# Patient Record
Sex: FEMALE | Race: WHITE | NOT HISPANIC OR LATINO | Employment: FULL TIME | URBAN - METROPOLITAN AREA
[De-identification: names, ages, dates, MRNs, and addresses within clinical notes are randomized per-mention and may not be internally consistent; named-entity substitution may affect disease eponyms.]

---

## 2018-01-04 ENCOUNTER — OFFICE VISIT CONVERTED (OUTPATIENT)
Dept: INTERNAL MEDICINE | Facility: CLINIC | Age: 43
End: 2018-01-04
Attending: INTERNAL MEDICINE

## 2018-01-08 ENCOUNTER — OFFICE VISIT CONVERTED (OUTPATIENT)
Dept: INTERNAL MEDICINE | Facility: CLINIC | Age: 43
End: 2018-01-08
Attending: PHYSICIAN ASSISTANT

## 2018-02-09 ENCOUNTER — OFFICE VISIT CONVERTED (OUTPATIENT)
Dept: INTERNAL MEDICINE | Facility: CLINIC | Age: 43
End: 2018-02-09
Attending: INTERNAL MEDICINE

## 2018-02-09 ENCOUNTER — CONVERSION ENCOUNTER (OUTPATIENT)
Dept: INTERNAL MEDICINE | Facility: CLINIC | Age: 43
End: 2018-02-09

## 2018-03-07 ENCOUNTER — OFFICE VISIT CONVERTED (OUTPATIENT)
Dept: ORTHOPEDIC SURGERY | Facility: CLINIC | Age: 43
End: 2018-03-07
Attending: ORTHOPAEDIC SURGERY

## 2018-03-12 ENCOUNTER — CONVERSION ENCOUNTER (OUTPATIENT)
Dept: INTERNAL MEDICINE | Facility: CLINIC | Age: 43
End: 2018-03-12

## 2018-03-12 ENCOUNTER — OFFICE VISIT CONVERTED (OUTPATIENT)
Dept: INTERNAL MEDICINE | Facility: CLINIC | Age: 43
End: 2018-03-12
Attending: INTERNAL MEDICINE

## 2018-03-26 ENCOUNTER — OFFICE VISIT CONVERTED (OUTPATIENT)
Dept: ORTHOPEDIC SURGERY | Facility: CLINIC | Age: 43
End: 2018-03-26
Attending: PHYSICIAN ASSISTANT

## 2018-03-29 ENCOUNTER — OFFICE VISIT CONVERTED (OUTPATIENT)
Dept: INTERNAL MEDICINE | Facility: CLINIC | Age: 43
End: 2018-03-29
Attending: NURSE PRACTITIONER

## 2018-03-29 ENCOUNTER — CONVERSION ENCOUNTER (OUTPATIENT)
Dept: OTHER | Facility: HOSPITAL | Age: 43
End: 2018-03-29

## 2018-04-27 ENCOUNTER — OFFICE VISIT CONVERTED (OUTPATIENT)
Dept: INTERNAL MEDICINE | Facility: CLINIC | Age: 43
End: 2018-04-27
Attending: INTERNAL MEDICINE

## 2018-05-17 ENCOUNTER — OFFICE VISIT CONVERTED (OUTPATIENT)
Dept: INTERNAL MEDICINE | Facility: CLINIC | Age: 43
End: 2018-05-17
Attending: NURSE PRACTITIONER

## 2018-06-12 ENCOUNTER — OFFICE VISIT CONVERTED (OUTPATIENT)
Dept: INTERNAL MEDICINE | Facility: CLINIC | Age: 43
End: 2018-06-12
Attending: INTERNAL MEDICINE

## 2018-06-18 ENCOUNTER — OFFICE VISIT CONVERTED (OUTPATIENT)
Dept: GASTROENTEROLOGY | Facility: CLINIC | Age: 43
End: 2018-06-18
Attending: INTERNAL MEDICINE

## 2018-06-25 ENCOUNTER — OFFICE VISIT CONVERTED (OUTPATIENT)
Dept: INTERNAL MEDICINE | Facility: CLINIC | Age: 43
End: 2018-06-25
Attending: INTERNAL MEDICINE

## 2018-07-31 ENCOUNTER — OFFICE VISIT CONVERTED (OUTPATIENT)
Dept: INTERNAL MEDICINE | Facility: CLINIC | Age: 43
End: 2018-07-31
Attending: INTERNAL MEDICINE

## 2018-09-26 ENCOUNTER — CONVERSION ENCOUNTER (OUTPATIENT)
Dept: INTERNAL MEDICINE | Facility: CLINIC | Age: 43
End: 2018-09-26

## 2018-09-26 ENCOUNTER — OFFICE VISIT CONVERTED (OUTPATIENT)
Dept: INTERNAL MEDICINE | Facility: CLINIC | Age: 43
End: 2018-09-26
Attending: PHYSICIAN ASSISTANT

## 2018-10-09 ENCOUNTER — OFFICE VISIT CONVERTED (OUTPATIENT)
Dept: GASTROENTEROLOGY | Facility: CLINIC | Age: 43
End: 2018-10-09
Attending: INTERNAL MEDICINE

## 2018-11-01 ENCOUNTER — OFFICE VISIT CONVERTED (OUTPATIENT)
Dept: ONCOLOGY | Facility: HOSPITAL | Age: 43
End: 2018-11-01
Attending: INTERNAL MEDICINE

## 2018-11-02 ENCOUNTER — OFFICE VISIT CONVERTED (OUTPATIENT)
Dept: INTERNAL MEDICINE | Facility: CLINIC | Age: 43
End: 2018-11-02
Attending: INTERNAL MEDICINE

## 2018-11-29 ENCOUNTER — OFFICE VISIT CONVERTED (OUTPATIENT)
Dept: INTERNAL MEDICINE | Facility: CLINIC | Age: 43
End: 2018-11-29
Attending: INTERNAL MEDICINE

## 2019-01-11 ENCOUNTER — HOSPITAL ENCOUNTER (OUTPATIENT)
Dept: OTHER | Facility: HOSPITAL | Age: 44
Discharge: HOME | End: 2019-01-11
Attending: INTERNAL MEDICINE

## 2019-01-11 LAB
CONV THERAPEUTIC PHLEBOTOMY: 500 ML
FERRITIN SERPL-MCNC: 379 NG/ML (ref 10–200)
HBA1C MFR BLD: 14.2 G/DL (ref 12–16)
HCT VFR BLD AUTO: 38.9 % (ref 37–47)

## 2019-01-16 ENCOUNTER — OFFICE VISIT CONVERTED (OUTPATIENT)
Dept: ORTHOPEDIC SURGERY | Facility: CLINIC | Age: 44
End: 2019-01-16
Attending: PHYSICIAN ASSISTANT

## 2019-01-17 ENCOUNTER — HOSPITAL ENCOUNTER (OUTPATIENT)
Dept: PERIOP | Facility: HOSPITAL | Age: 44
Setting detail: HOSPITAL OUTPATIENT SURGERY
Discharge: HOME | End: 2019-01-17
Attending: ORTHOPAEDIC SURGERY

## 2019-01-30 ENCOUNTER — OFFICE VISIT CONVERTED (OUTPATIENT)
Dept: ORTHOPEDIC SURGERY | Facility: CLINIC | Age: 44
End: 2019-01-30
Attending: PHYSICIAN ASSISTANT

## 2019-02-05 ENCOUNTER — HOSPITAL ENCOUNTER (OUTPATIENT)
Dept: ONCOLOGY | Facility: HOSPITAL | Age: 44
Discharge: HOME | End: 2019-02-05
Attending: NURSE PRACTITIONER

## 2019-02-05 ENCOUNTER — OFFICE VISIT CONVERTED (OUTPATIENT)
Dept: ONCOLOGY | Facility: HOSPITAL | Age: 44
End: 2019-02-05
Attending: NURSE PRACTITIONER

## 2019-02-05 LAB
ALBUMIN SERPL-MCNC: 4.1 G/DL (ref 3.5–5)
ALBUMIN/GLOB SERPL: 1.3 {RATIO} (ref 1.4–2.6)
ALP SERPL-CCNC: 80 U/L (ref 42–98)
ALT SERPL-CCNC: 15 U/L (ref 10–40)
ANION GAP SERPL CALC-SCNC: 14 MMOL/L (ref 8–19)
AST SERPL-CCNC: 16 U/L (ref 15–50)
BASOPHILS # BLD AUTO: 0.07 10*3/UL (ref 0–0.2)
BASOPHILS NFR BLD AUTO: 1.38 % (ref 0–3)
BILIRUB SERPL-MCNC: 0.44 MG/DL (ref 0.2–1.3)
BUN SERPL-MCNC: 12 MG/DL (ref 5–25)
BUN/CREAT SERPL: 21 {RATIO} (ref 6–20)
CALCIUM SERPL-MCNC: 9.2 MG/DL (ref 8.7–10.4)
CHLORIDE SERPL-SCNC: 104 MMOL/L (ref 99–111)
CONV CO2: 28 MMOL/L (ref 22–32)
CONV TOTAL PROTEIN: 7.3 G/DL (ref 6.3–8.2)
CREAT UR-MCNC: 0.56 MG/DL (ref 0.5–0.9)
EOSINOPHIL # BLD AUTO: 0.12 10*3/UL (ref 0–0.7)
EOSINOPHIL # BLD AUTO: 2.41 % (ref 0–7)
ERYTHROCYTE [DISTWIDTH] IN BLOOD BY AUTOMATED COUNT: 11 % (ref 11.5–14.5)
FERRITIN SERPL-MCNC: 439 NG/ML (ref 10–200)
GFR SERPLBLD BASED ON 1.73 SQ M-ARVRAT: >60 ML/MIN/{1.73_M2}
GLOBULIN UR ELPH-MCNC: 3.2 G/DL (ref 2–3.5)
GLUCOSE SERPL-MCNC: 95 MG/DL (ref 65–99)
HBA1C MFR BLD: 12.9 G/DL (ref 12–16)
HCT VFR BLD AUTO: 35.8 % (ref 37–47)
IRON SATN MFR SERPL: 81 % (ref 20–55)
IRON SERPL-MCNC: 208 UG/DL (ref 60–170)
LYMPHOCYTES # BLD AUTO: 1.28 10*3/UL (ref 1–5)
MCH RBC QN AUTO: 34.4 PG (ref 27–31)
MCHC RBC AUTO-ENTMCNC: 36 G/DL (ref 33–37)
MCV RBC AUTO: 95.6 FL (ref 81–99)
MONOCYTES # BLD AUTO: 0.39 10*3/UL (ref 0.2–1.2)
MONOCYTES NFR BLD AUTO: 7.99 % (ref 3–10)
NEUTROPHILS # BLD AUTO: 3.05 10*3/UL (ref 2–8)
NEUTROPHILS NFR BLD AUTO: 62.2 % (ref 30–85)
NRBC BLD AUTO-RTO: 0 % (ref 0–0.01)
OSMOLALITY SERPL CALC.SUM OF ELEC: 294 MOSM/KG (ref 273–304)
PLATELET # BLD AUTO: 249 10*3/UL (ref 130–400)
PMV BLD AUTO: 7.7 FL (ref 7.4–10.4)
POTASSIUM SERPL-SCNC: 3.6 MMOL/L (ref 3.5–5.3)
RBC # BLD AUTO: 3.75 10*6/UL (ref 4.2–5.4)
SODIUM SERPL-SCNC: 142 MMOL/L (ref 135–147)
TIBC SERPL-MCNC: 256 UG/DL (ref 245–450)
TRANSFERRIN SERPL-MCNC: 179 MG/DL (ref 250–380)
VARIANT LYMPHS NFR BLD MANUAL: 26 % (ref 20–45)
WBC # BLD AUTO: 4.9 10*3/UL (ref 4.8–10.8)

## 2019-02-06 ENCOUNTER — HOSPITAL ENCOUNTER (OUTPATIENT)
Dept: OTHER | Facility: HOSPITAL | Age: 44
Discharge: HOME | End: 2019-02-06
Attending: NURSE PRACTITIONER

## 2019-02-06 LAB
CONV THERAPEUTIC PHLEBOTOMY: 504 ML
FERRITIN SERPL-MCNC: 430 NG/ML (ref 10–200)
HBA1C MFR BLD: 13.1 G/DL (ref 12–16)
HCT VFR BLD AUTO: 36.6 % (ref 37–47)

## 2019-02-08 ENCOUNTER — OFFICE VISIT CONVERTED (OUTPATIENT)
Dept: INTERNAL MEDICINE | Facility: CLINIC | Age: 44
End: 2019-02-08
Attending: INTERNAL MEDICINE

## 2019-02-15 ENCOUNTER — HOSPITAL ENCOUNTER (OUTPATIENT)
Dept: OTHER | Facility: HOSPITAL | Age: 44
Discharge: HOME | End: 2019-02-15
Attending: NURSE PRACTITIONER

## 2019-02-15 LAB
CONV THERAPEUTIC PHLEBOTOMY: 513 ML
FERRITIN SERPL-MCNC: 319 NG/ML (ref 10–200)
HBA1C MFR BLD: 11.9 G/DL (ref 12–16)
HCT VFR BLD AUTO: 34.5 % (ref 37–47)

## 2019-02-18 ENCOUNTER — OFFICE VISIT CONVERTED (OUTPATIENT)
Dept: PLASTIC SURGERY | Facility: CLINIC | Age: 44
End: 2019-02-18
Attending: PLASTIC SURGERY

## 2019-02-19 ENCOUNTER — HOSPITAL ENCOUNTER (OUTPATIENT)
Dept: MRI IMAGING | Facility: HOSPITAL | Age: 44
Discharge: HOME | End: 2019-02-19
Attending: PLASTIC SURGERY

## 2019-02-21 ENCOUNTER — HOSPITAL ENCOUNTER (OUTPATIENT)
Dept: OTHER | Facility: HOSPITAL | Age: 44
Discharge: HOME | End: 2019-02-21
Attending: NURSE PRACTITIONER

## 2019-02-21 LAB
CONV THERAPEUTIC PHLEBOTOMY: 508 ML
FERRITIN SERPL-MCNC: 281 NG/ML (ref 10–200)
HBA1C MFR BLD: 11.5 G/DL (ref 12–16)
HCT VFR BLD AUTO: 33.9 % (ref 37–47)

## 2019-03-01 ENCOUNTER — OFFICE VISIT CONVERTED (OUTPATIENT)
Dept: ORTHOPEDIC SURGERY | Facility: CLINIC | Age: 44
End: 2019-03-01
Attending: ORTHOPAEDIC SURGERY

## 2019-03-04 ENCOUNTER — OFFICE VISIT CONVERTED (OUTPATIENT)
Dept: PLASTIC SURGERY | Facility: CLINIC | Age: 44
End: 2019-03-04
Attending: PLASTIC SURGERY

## 2019-03-12 ENCOUNTER — HOSPITAL ENCOUNTER (OUTPATIENT)
Dept: OTHER | Facility: HOSPITAL | Age: 44
Discharge: HOME | End: 2019-03-12

## 2019-03-12 LAB
ALBUMIN SERPL-MCNC: 4 G/DL (ref 3.5–5)
ALBUMIN/GLOB SERPL: 1.4 {RATIO} (ref 1.4–2.6)
ALP SERPL-CCNC: 99 U/L (ref 42–98)
ALT SERPL-CCNC: 29 U/L (ref 10–40)
ANION GAP SERPL CALC-SCNC: 16 MMOL/L (ref 8–19)
AST SERPL-CCNC: 27 U/L (ref 15–50)
BASOPHILS # BLD AUTO: 0.09 10*3/UL (ref 0–0.2)
BASOPHILS NFR BLD AUTO: 1.5 % (ref 0–3)
BILIRUB SERPL-MCNC: 0.18 MG/DL (ref 0.2–1.3)
BUN SERPL-MCNC: 15 MG/DL (ref 5–25)
BUN/CREAT SERPL: 17 {RATIO} (ref 6–20)
CALCIUM SERPL-MCNC: 9 MG/DL (ref 8.7–10.4)
CEA SERPL-MCNC: 0.5 NG/ML (ref 0–5)
CHLORIDE SERPL-SCNC: 104 MMOL/L (ref 99–111)
CONV ABS IMM GRAN: 0.03 10*3/UL (ref 0–0.2)
CONV CO2: 26 MMOL/L (ref 22–32)
CONV IMMATURE GRAN: 0.5 % (ref 0–1.8)
CONV TOTAL PROTEIN: 6.9 G/DL (ref 6.3–8.2)
CREAT UR-MCNC: 0.9 MG/DL (ref 0.5–0.9)
DEPRECATED RDW RBC AUTO: 45 FL (ref 36.4–46.3)
EOSINOPHIL # BLD AUTO: 0.13 10*3/UL (ref 0–0.7)
EOSINOPHIL # BLD AUTO: 2.2 % (ref 0–7)
ERYTHROCYTE [DISTWIDTH] IN BLOOD BY AUTOMATED COUNT: 12.2 % (ref 11.7–14.4)
GFR SERPLBLD BASED ON 1.73 SQ M-ARVRAT: >60 ML/MIN/{1.73_M2}
GLOBULIN UR ELPH-MCNC: 2.9 G/DL (ref 2–3.5)
GLUCOSE SERPL-MCNC: 105 MG/DL (ref 65–99)
HBA1C MFR BLD: 12.3 G/DL (ref 12–16)
HCT VFR BLD AUTO: 35.7 % (ref 37–47)
LDH SERPL-CCNC: 196 U/L (ref 120–240)
LYMPHOCYTES # BLD AUTO: 1.4 10*3/UL (ref 1–5)
MCH RBC QN AUTO: 34.4 PG (ref 27–31)
MCHC RBC AUTO-ENTMCNC: 34.5 G/DL (ref 33–37)
MCV RBC AUTO: 99.7 FL (ref 81–99)
MONOCYTES # BLD AUTO: 0.51 10*3/UL (ref 0.2–1.2)
MONOCYTES NFR BLD AUTO: 8.7 % (ref 3–10)
NEUTROPHILS # BLD AUTO: 3.7 10*3/UL (ref 2–8)
NEUTROPHILS NFR BLD AUTO: 63.2 % (ref 30–85)
NRBC CBCN: 0 % (ref 0–0.7)
OSMOLALITY SERPL CALC.SUM OF ELEC: 295 MOSM/KG (ref 273–304)
PLATELET # BLD AUTO: 237 10*3/UL (ref 130–400)
PMV BLD AUTO: 11.2 FL (ref 9.4–12.3)
POTASSIUM SERPL-SCNC: 3.8 MMOL/L (ref 3.5–5.3)
RBC # BLD AUTO: 3.58 10*6/UL (ref 4.2–5.4)
SODIUM SERPL-SCNC: 142 MMOL/L (ref 135–147)
VARIANT LYMPHS NFR BLD MANUAL: 23.9 % (ref 20–45)
WBC # BLD AUTO: 5.86 10*3/UL (ref 4.8–10.8)

## 2019-03-14 LAB — CANCER AG27-29 SERPL-ACNC: 28.7 U/ML (ref 0–38.6)

## 2019-04-08 ENCOUNTER — OFFICE VISIT CONVERTED (OUTPATIENT)
Dept: ORTHOPEDIC SURGERY | Facility: CLINIC | Age: 44
End: 2019-04-08
Attending: ORTHOPAEDIC SURGERY

## 2019-04-08 ENCOUNTER — HOSPITAL ENCOUNTER (OUTPATIENT)
Dept: OTHER | Facility: HOSPITAL | Age: 44
Discharge: HOME | End: 2019-04-08
Attending: NURSE PRACTITIONER

## 2019-04-08 ENCOUNTER — CONVERSION ENCOUNTER (OUTPATIENT)
Dept: ORTHOPEDIC SURGERY | Facility: CLINIC | Age: 44
End: 2019-04-08

## 2019-04-08 LAB
BASOPHILS # BLD AUTO: 0.09 10*3/UL (ref 0–0.2)
BASOPHILS NFR BLD AUTO: 1.5 % (ref 0–3)
CONV ABS IMM GRAN: 0.02 10*3/UL (ref 0–0.2)
CONV IMMATURE GRAN: 0.3 % (ref 0–1.8)
DEPRECATED RDW RBC AUTO: 40.4 FL (ref 36.4–46.3)
EOSINOPHIL # BLD AUTO: 0.18 10*3/UL (ref 0–0.7)
EOSINOPHIL # BLD AUTO: 3 % (ref 0–7)
ERYTHROCYTE [DISTWIDTH] IN BLOOD BY AUTOMATED COUNT: 11.3 % (ref 11.7–14.4)
HBA1C MFR BLD: 13.2 G/DL (ref 12–16)
HCT VFR BLD AUTO: 37.9 % (ref 37–47)
LYMPHOCYTES # BLD AUTO: 1.5 10*3/UL (ref 1–5)
MCH RBC QN AUTO: 33.9 PG (ref 27–31)
MCHC RBC AUTO-ENTMCNC: 34.8 G/DL (ref 33–37)
MCV RBC AUTO: 97.4 FL (ref 81–99)
MONOCYTES # BLD AUTO: 0.49 10*3/UL (ref 0.2–1.2)
MONOCYTES NFR BLD AUTO: 8 % (ref 3–10)
NEUTROPHILS # BLD AUTO: 3.81 10*3/UL (ref 2–8)
NEUTROPHILS NFR BLD AUTO: 62.6 % (ref 30–85)
NRBC CBCN: 0 % (ref 0–0.7)
PLATELET # BLD AUTO: 255 10*3/UL (ref 130–400)
PMV BLD AUTO: 11.4 FL (ref 9.4–12.3)
RBC # BLD AUTO: 3.89 10*6/UL (ref 4.2–5.4)
VARIANT LYMPHS NFR BLD MANUAL: 24.6 % (ref 20–45)
WBC # BLD AUTO: 6.09 10*3/UL (ref 4.8–10.8)

## 2019-04-09 ENCOUNTER — HOSPITAL ENCOUNTER (OUTPATIENT)
Dept: ONCOLOGY | Facility: HOSPITAL | Age: 44
Discharge: HOME | End: 2019-04-09

## 2019-04-09 ENCOUNTER — OFFICE VISIT CONVERTED (OUTPATIENT)
Dept: ONCOLOGY | Facility: HOSPITAL | Age: 44
End: 2019-04-09

## 2019-04-09 LAB — FERRITIN SERPL-MCNC: 208 NG/ML (ref 10–200)

## 2019-04-19 ENCOUNTER — HOSPITAL ENCOUNTER (OUTPATIENT)
Dept: OTHER | Facility: HOSPITAL | Age: 44
Discharge: HOME | End: 2019-04-19
Attending: NURSE PRACTITIONER

## 2019-04-19 LAB
CONV THERAPEUTIC PHLEBOTOMY: 500 ML
FERRITIN SERPL-MCNC: 254 NG/ML (ref 10–200)
HBA1C MFR BLD: 13.7 G/DL (ref 12–16)
HCT VFR BLD AUTO: 38.6 % (ref 37–47)

## 2019-04-23 ENCOUNTER — HOSPITAL ENCOUNTER (OUTPATIENT)
Dept: OTHER | Facility: HOSPITAL | Age: 44
Discharge: HOME | End: 2019-04-23
Attending: NURSE PRACTITIONER

## 2019-04-23 ENCOUNTER — OFFICE VISIT CONVERTED (OUTPATIENT)
Dept: INTERNAL MEDICINE | Facility: CLINIC | Age: 44
End: 2019-04-23
Attending: INTERNAL MEDICINE

## 2019-04-26 ENCOUNTER — HOSPITAL ENCOUNTER (OUTPATIENT)
Dept: MRI IMAGING | Facility: HOSPITAL | Age: 44
Discharge: HOME | End: 2019-04-26
Attending: INTERNAL MEDICINE

## 2019-05-09 ENCOUNTER — CONVERSION ENCOUNTER (OUTPATIENT)
Dept: INTERNAL MEDICINE | Facility: CLINIC | Age: 44
End: 2019-05-09

## 2019-05-09 ENCOUNTER — OFFICE VISIT CONVERTED (OUTPATIENT)
Dept: INTERNAL MEDICINE | Facility: CLINIC | Age: 44
End: 2019-05-09
Attending: INTERNAL MEDICINE

## 2019-06-10 ENCOUNTER — OFFICE VISIT CONVERTED (OUTPATIENT)
Dept: INTERNAL MEDICINE | Facility: CLINIC | Age: 44
End: 2019-06-10
Attending: INTERNAL MEDICINE

## 2019-06-10 ENCOUNTER — HOSPITAL ENCOUNTER (OUTPATIENT)
Dept: OTHER | Facility: HOSPITAL | Age: 44
Discharge: HOME | End: 2019-06-10
Attending: INTERNAL MEDICINE

## 2019-06-10 LAB
BASOPHILS # BLD AUTO: 0.06 10*3/UL (ref 0–0.2)
BASOPHILS NFR BLD AUTO: 1.3 % (ref 0–3)
CHOLEST SERPL-MCNC: 176 MG/DL (ref 107–200)
CHOLEST/HDLC SERPL: 3.6 {RATIO} (ref 3–6)
CONV ABS IMM GRAN: 0.02 10*3/UL (ref 0–0.2)
CONV IMMATURE GRAN: 0.4 % (ref 0–1.8)
DEPRECATED RDW RBC AUTO: 47.8 FL (ref 36.4–46.3)
EOSINOPHIL # BLD AUTO: 0.1 10*3/UL (ref 0–0.7)
EOSINOPHIL # BLD AUTO: 2.2 % (ref 0–7)
ERYTHROCYTE [DISTWIDTH] IN BLOOD BY AUTOMATED COUNT: 13 % (ref 11.7–14.4)
FSH SERPL-ACNC: 61.5 M[IU]/ML
HBA1C MFR BLD: 12.8 G/DL (ref 12–16)
HCT VFR BLD AUTO: 38.7 % (ref 37–47)
HDLC SERPL-MCNC: 49 MG/DL (ref 40–60)
LDLC SERPL CALC-MCNC: 105 MG/DL (ref 70–100)
LYMPHOCYTES # BLD AUTO: 1.21 10*3/UL (ref 1–5)
MCH RBC QN AUTO: 33.4 PG (ref 27–31)
MCHC RBC AUTO-ENTMCNC: 33.1 G/DL (ref 33–37)
MCV RBC AUTO: 101 FL (ref 81–99)
MONOCYTES # BLD AUTO: 0.4 10*3/UL (ref 0.2–1.2)
MONOCYTES NFR BLD AUTO: 8.7 % (ref 3–10)
NEUTROPHILS # BLD AUTO: 2.83 10*3/UL (ref 2–8)
NEUTROPHILS NFR BLD AUTO: 61.2 % (ref 30–85)
NRBC CBCN: 0 % (ref 0–0.7)
PLATELET # BLD AUTO: 229 10*3/UL (ref 130–400)
PMV BLD AUTO: 11.4 FL (ref 9.4–12.3)
RBC # BLD AUTO: 3.83 10*6/UL (ref 4.2–5.4)
TRIGL SERPL-MCNC: 110 MG/DL (ref 40–150)
VARIANT LYMPHS NFR BLD MANUAL: 26.2 % (ref 20–45)
VLDLC SERPL-MCNC: 22 MG/DL (ref 5–37)
WBC # BLD AUTO: 4.62 10*3/UL (ref 4.8–10.8)

## 2019-06-17 ENCOUNTER — OFFICE VISIT CONVERTED (OUTPATIENT)
Dept: PLASTIC SURGERY | Facility: CLINIC | Age: 44
End: 2019-06-17
Attending: PLASTIC SURGERY

## 2019-08-23 ENCOUNTER — HOSPITAL ENCOUNTER (OUTPATIENT)
Dept: OTHER | Facility: HOSPITAL | Age: 44
Discharge: HOME | End: 2019-08-23
Attending: INTERNAL MEDICINE

## 2019-08-23 ENCOUNTER — OFFICE VISIT CONVERTED (OUTPATIENT)
Dept: INTERNAL MEDICINE | Facility: CLINIC | Age: 44
End: 2019-08-23
Attending: INTERNAL MEDICINE

## 2019-08-23 LAB
ALBUMIN SERPL-MCNC: 4.6 G/DL (ref 3.5–5)
ALBUMIN/GLOB SERPL: 1.4 {RATIO} (ref 1.4–2.6)
ALP SERPL-CCNC: 94 U/L (ref 42–98)
ALT SERPL-CCNC: 16 U/L (ref 10–40)
ANION GAP SERPL CALC-SCNC: 14 MMOL/L (ref 8–19)
AST SERPL-CCNC: 22 U/L (ref 15–50)
BASOPHILS # BLD AUTO: 0.07 10*3/UL (ref 0–0.2)
BASOPHILS NFR BLD AUTO: 1.2 % (ref 0–3)
BILIRUB SERPL-MCNC: 0.3 MG/DL (ref 0.2–1.3)
BUN SERPL-MCNC: 16 MG/DL (ref 5–25)
BUN/CREAT SERPL: 25 {RATIO} (ref 6–20)
CALCIUM SERPL-MCNC: 9.4 MG/DL (ref 8.7–10.4)
CHLORIDE SERPL-SCNC: 100 MMOL/L (ref 99–111)
CONV ABS IMM GRAN: 0.02 10*3/UL (ref 0–0.2)
CONV CO2: 28 MMOL/L (ref 22–32)
CONV IMMATURE GRAN: 0.3 % (ref 0–1.8)
CONV TOTAL PROTEIN: 7.8 G/DL (ref 6.3–8.2)
CREAT UR-MCNC: 0.64 MG/DL (ref 0.5–0.9)
DEPRECATED RDW RBC AUTO: 43.2 FL (ref 36.4–46.3)
EOSINOPHIL # BLD AUTO: 0.14 10*3/UL (ref 0–0.7)
EOSINOPHIL # BLD AUTO: 2.4 % (ref 0–7)
ERYTHROCYTE [DISTWIDTH] IN BLOOD BY AUTOMATED COUNT: 11.9 % (ref 11.7–14.4)
FERRITIN SERPL-MCNC: 105 NG/ML (ref 10–200)
FSH SERPL-ACNC: 107 M[IU]/ML
GFR SERPLBLD BASED ON 1.73 SQ M-ARVRAT: >60 ML/MIN/{1.73_M2}
GLOBULIN UR ELPH-MCNC: 3.2 G/DL (ref 2–3.5)
GLUCOSE SERPL-MCNC: 93 MG/DL (ref 65–99)
HCT VFR BLD AUTO: 42.5 % (ref 37–47)
HGB BLD-MCNC: 14 G/DL (ref 12–16)
IRON SATN MFR SERPL: 34 % (ref 20–55)
IRON SERPL-MCNC: 94 UG/DL (ref 60–170)
LYMPHOCYTES # BLD AUTO: 1.44 10*3/UL (ref 1–5)
LYMPHOCYTES NFR BLD AUTO: 24.4 % (ref 20–45)
MCH RBC QN AUTO: 33 PG (ref 27–31)
MCHC RBC AUTO-ENTMCNC: 32.9 G/DL (ref 33–37)
MCV RBC AUTO: 100.2 FL (ref 81–99)
MONOCYTES # BLD AUTO: 0.55 10*3/UL (ref 0.2–1.2)
MONOCYTES NFR BLD AUTO: 9.3 % (ref 3–10)
NEUTROPHILS # BLD AUTO: 3.67 10*3/UL (ref 2–8)
NEUTROPHILS NFR BLD AUTO: 62.4 % (ref 30–85)
NRBC CBCN: 0 % (ref 0–0.7)
OSMOLALITY SERPL CALC.SUM OF ELEC: 287 MOSM/KG (ref 273–304)
PLATELET # BLD AUTO: 264 10*3/UL (ref 130–400)
PMV BLD AUTO: 11.5 FL (ref 9.4–12.3)
POTASSIUM SERPL-SCNC: 3.7 MMOL/L (ref 3.5–5.3)
RBC # BLD AUTO: 4.24 10*6/UL (ref 4.2–5.4)
SODIUM SERPL-SCNC: 138 MMOL/L (ref 135–147)
TIBC SERPL-MCNC: 273 UG/DL (ref 245–450)
TRANSFERRIN SERPL-MCNC: 191 MG/DL (ref 250–380)
TSH SERPL-ACNC: 0.96 M[IU]/L (ref 0.27–4.2)
WBC # BLD AUTO: 5.89 10*3/UL (ref 4.8–10.8)

## 2019-08-25 LAB — PROGEST SERPL-MCNC: 0.2 NG/ML

## 2019-08-26 ENCOUNTER — OFFICE VISIT CONVERTED (OUTPATIENT)
Dept: PLASTIC SURGERY | Facility: CLINIC | Age: 44
End: 2019-08-26
Attending: PLASTIC SURGERY

## 2019-08-26 ENCOUNTER — CONVERSION ENCOUNTER (OUTPATIENT)
Dept: PLASTIC SURGERY | Facility: CLINIC | Age: 44
End: 2019-08-26

## 2019-08-26 LAB — CONV ESTROGENS, TOTAL, SERUM: 85 PG/ML

## 2019-08-28 ENCOUNTER — TELEPHONE CONVERTED (OUTPATIENT)
Dept: ONCOLOGY | Facility: HOSPITAL | Age: 44
End: 2019-08-28

## 2019-08-29 ENCOUNTER — TRANSCRIBE ORDERS (OUTPATIENT)
Dept: ADMINISTRATIVE | Facility: HOSPITAL | Age: 44
End: 2019-08-29

## 2019-08-29 DIAGNOSIS — C50.919 MALIGNANT NEOPLASM OF FEMALE BREAST, UNSPECIFIED ESTROGEN RECEPTOR STATUS, UNSPECIFIED LATERALITY, UNSPECIFIED SITE OF BREAST (HCC): ICD-10-CM

## 2019-08-29 DIAGNOSIS — C50.919: Primary | ICD-10-CM

## 2019-08-29 DIAGNOSIS — Z17.1: Primary | ICD-10-CM

## 2019-08-31 LAB
CONV TESTOSTERONE, FREE: 1 PG/ML
TESTOST FREE MFR SERPL: 0.8 %
TESTOST SERPL-MCNC: 13 NG/DL

## 2019-09-04 ENCOUNTER — HOSPITAL ENCOUNTER (OUTPATIENT)
Dept: PERIOP | Facility: HOSPITAL | Age: 44
Setting detail: HOSPITAL OUTPATIENT SURGERY
Discharge: HOME | End: 2019-09-04
Attending: PLASTIC SURGERY

## 2019-09-04 ENCOUNTER — PROCEDURE VISIT CONVERTED (OUTPATIENT)
Dept: OTHER | Facility: HOSPITAL | Age: 44
End: 2019-09-04
Attending: PLASTIC SURGERY

## 2019-09-06 ENCOUNTER — HOSPITAL ENCOUNTER (OUTPATIENT)
Dept: PET IMAGING | Facility: HOSPITAL | Age: 44
Discharge: HOME | End: 2019-09-06
Admitting: INTERNAL MEDICINE

## 2019-09-06 ENCOUNTER — HOSPITAL ENCOUNTER (OUTPATIENT)
Dept: PET IMAGING | Facility: HOSPITAL | Age: 44
Discharge: HOME | End: 2019-09-06

## 2019-09-06 DIAGNOSIS — C50.919 MALIGNANT NEOPLASM OF FEMALE BREAST, UNSPECIFIED ESTROGEN RECEPTOR STATUS, UNSPECIFIED LATERALITY, UNSPECIFIED SITE OF BREAST (HCC): ICD-10-CM

## 2019-09-06 LAB — GLUCOSE BLDC GLUCOMTR-MCNC: 90 MG/DL (ref 70–130)

## 2019-09-06 PROCEDURE — A9552 F18 FDG: HCPCS | Performed by: INTERNAL MEDICINE

## 2019-09-06 PROCEDURE — 78815 PET IMAGE W/CT SKULL-THIGH: CPT

## 2019-09-06 PROCEDURE — 82962 GLUCOSE BLOOD TEST: CPT

## 2019-09-06 PROCEDURE — 0 FLUDEOXYGLUCOSE F18 SOLUTION: Performed by: INTERNAL MEDICINE

## 2019-09-06 RX ADMIN — FLUDEOXYGLUCOSE F18 1 DOSE: 300 INJECTION INTRAVENOUS at 12:32

## 2019-09-09 ENCOUNTER — OFFICE VISIT CONVERTED (OUTPATIENT)
Dept: PLASTIC SURGERY | Facility: CLINIC | Age: 44
End: 2019-09-09
Attending: PLASTIC SURGERY

## 2019-10-14 ENCOUNTER — CONVERSION ENCOUNTER (OUTPATIENT)
Dept: INTERNAL MEDICINE | Facility: CLINIC | Age: 44
End: 2019-10-14

## 2019-10-14 ENCOUNTER — OFFICE VISIT CONVERTED (OUTPATIENT)
Dept: INTERNAL MEDICINE | Facility: CLINIC | Age: 44
End: 2019-10-14
Attending: INTERNAL MEDICINE

## 2019-10-14 ENCOUNTER — OFFICE VISIT CONVERTED (OUTPATIENT)
Dept: PLASTIC SURGERY | Facility: CLINIC | Age: 44
End: 2019-10-14
Attending: PLASTIC SURGERY

## 2019-10-25 ENCOUNTER — HOSPITAL ENCOUNTER (OUTPATIENT)
Dept: URGENT CARE | Facility: CLINIC | Age: 44
Discharge: HOME | End: 2019-10-25

## 2019-11-13 ENCOUNTER — OFFICE VISIT CONVERTED (OUTPATIENT)
Dept: ORTHOPEDIC SURGERY | Facility: CLINIC | Age: 44
End: 2019-11-13
Attending: PHYSICIAN ASSISTANT

## 2019-12-02 ENCOUNTER — OFFICE VISIT CONVERTED (OUTPATIENT)
Dept: INTERNAL MEDICINE | Facility: CLINIC | Age: 44
End: 2019-12-02
Attending: INTERNAL MEDICINE

## 2020-01-02 ENCOUNTER — OFFICE VISIT CONVERTED (OUTPATIENT)
Dept: INTERNAL MEDICINE | Facility: CLINIC | Age: 45
End: 2020-01-02
Attending: INTERNAL MEDICINE

## 2020-01-20 ENCOUNTER — OFFICE VISIT CONVERTED (OUTPATIENT)
Dept: PLASTIC SURGERY | Facility: CLINIC | Age: 45
End: 2020-01-20
Attending: PLASTIC SURGERY

## 2020-01-20 ENCOUNTER — CONVERSION ENCOUNTER (OUTPATIENT)
Dept: PLASTIC SURGERY | Facility: CLINIC | Age: 45
End: 2020-01-20

## 2020-02-20 ENCOUNTER — OFFICE VISIT CONVERTED (OUTPATIENT)
Dept: INTERNAL MEDICINE | Facility: CLINIC | Age: 45
End: 2020-02-20
Attending: INTERNAL MEDICINE

## 2020-02-25 ENCOUNTER — OFFICE VISIT CONVERTED (OUTPATIENT)
Dept: NEUROSURGERY | Facility: CLINIC | Age: 45
End: 2020-02-25
Attending: PHYSICIAN ASSISTANT

## 2020-02-26 ENCOUNTER — HOSPITAL ENCOUNTER (OUTPATIENT)
Dept: GENERAL RADIOLOGY | Facility: HOSPITAL | Age: 45
Discharge: HOME | End: 2020-02-26
Attending: PHYSICIAN ASSISTANT

## 2020-02-28 ENCOUNTER — HOSPITAL ENCOUNTER (OUTPATIENT)
Dept: OTHER | Facility: HOSPITAL | Age: 45
Discharge: HOME | End: 2020-02-28

## 2020-02-28 LAB
ALBUMIN SERPL-MCNC: 4 G/DL (ref 3.5–5)
ALBUMIN/GLOB SERPL: 1.3 {RATIO} (ref 1.4–2.6)
ALP SERPL-CCNC: 72 U/L (ref 42–98)
ALT SERPL-CCNC: 12 U/L (ref 10–40)
ANION GAP SERPL CALC-SCNC: 18 MMOL/L (ref 8–19)
AST SERPL-CCNC: 16 U/L (ref 15–50)
BASOPHILS # BLD AUTO: 0.1 10*3/UL (ref 0–0.2)
BASOPHILS NFR BLD AUTO: 1.5 % (ref 0–3)
BILIRUB SERPL-MCNC: 0.38 MG/DL (ref 0.2–1.3)
BUN SERPL-MCNC: 13 MG/DL (ref 5–25)
BUN/CREAT SERPL: 19 {RATIO} (ref 6–20)
CALCIUM SERPL-MCNC: 9 MG/DL (ref 8.7–10.4)
CEA SERPL-MCNC: 0.5 NG/ML (ref 0–5)
CHLORIDE SERPL-SCNC: 100 MMOL/L (ref 99–111)
CONV ABS IMM GRAN: 0.03 10*3/UL (ref 0–0.2)
CONV CO2: 25 MMOL/L (ref 22–32)
CONV IMMATURE GRAN: 0.5 % (ref 0–1.8)
CONV TOTAL PROTEIN: 7 G/DL (ref 6.3–8.2)
CREAT UR-MCNC: 0.68 MG/DL (ref 0.5–0.9)
DEPRECATED RDW RBC AUTO: 43.6 FL (ref 36.4–46.3)
EOSINOPHIL # BLD AUTO: 0.15 10*3/UL (ref 0–0.7)
EOSINOPHIL # BLD AUTO: 2.3 % (ref 0–7)
ERYTHROCYTE [DISTWIDTH] IN BLOOD BY AUTOMATED COUNT: 12.1 % (ref 11.7–14.4)
FERRITIN SERPL-MCNC: 106 NG/ML (ref 10–200)
GFR SERPLBLD BASED ON 1.73 SQ M-ARVRAT: >60 ML/MIN/{1.73_M2}
GLOBULIN UR ELPH-MCNC: 3 G/DL (ref 2–3.5)
GLUCOSE SERPL-MCNC: 107 MG/DL (ref 65–99)
HCT VFR BLD AUTO: 40.4 % (ref 37–47)
HGB BLD-MCNC: 13.5 G/DL (ref 12–16)
IRON SATN MFR SERPL: 84 % (ref 20–55)
IRON SERPL-MCNC: 249 UG/DL (ref 60–170)
LDH SERPL-CCNC: 183 U/L (ref 120–240)
LYMPHOCYTES # BLD AUTO: 1.29 10*3/UL (ref 1–5)
LYMPHOCYTES NFR BLD AUTO: 19.4 % (ref 20–45)
MCH RBC QN AUTO: 32.9 PG (ref 27–31)
MCHC RBC AUTO-ENTMCNC: 33.4 G/DL (ref 33–37)
MCV RBC AUTO: 98.5 FL (ref 81–99)
MONOCYTES # BLD AUTO: 0.38 10*3/UL (ref 0.2–1.2)
MONOCYTES NFR BLD AUTO: 5.7 % (ref 3–10)
NEUTROPHILS # BLD AUTO: 4.71 10*3/UL (ref 2–8)
NEUTROPHILS NFR BLD AUTO: 70.6 % (ref 30–85)
NRBC CBCN: 0 % (ref 0–0.7)
OSMOLALITY SERPL CALC.SUM OF ELEC: 289 MOSM/KG (ref 273–304)
PLATELET # BLD AUTO: 273 10*3/UL (ref 130–400)
PMV BLD AUTO: 11.5 FL (ref 9.4–12.3)
POTASSIUM SERPL-SCNC: 3.6 MMOL/L (ref 3.5–5.3)
RBC # BLD AUTO: 4.1 10*6/UL (ref 4.2–5.4)
SODIUM SERPL-SCNC: 139 MMOL/L (ref 135–147)
TIBC SERPL-MCNC: 297 UG/DL (ref 245–450)
TRANSFERRIN SERPL-MCNC: 208 MG/DL (ref 250–380)
WBC # BLD AUTO: 6.66 10*3/UL (ref 4.8–10.8)

## 2020-02-29 LAB — CANCER AG27-29 SERPL-ACNC: 27.1 U/ML (ref 0–38.6)

## 2020-04-21 ENCOUNTER — TELEMEDICINE CONVERTED (OUTPATIENT)
Dept: NEUROSURGERY | Facility: CLINIC | Age: 45
End: 2020-04-21
Attending: NEUROLOGICAL SURGERY

## 2020-05-28 ENCOUNTER — OFFICE VISIT CONVERTED (OUTPATIENT)
Dept: INTERNAL MEDICINE | Facility: CLINIC | Age: 45
End: 2020-05-28
Attending: INTERNAL MEDICINE

## 2020-09-11 ENCOUNTER — HOSPITAL ENCOUNTER (OUTPATIENT)
Dept: URGENT CARE | Facility: CLINIC | Age: 45
Discharge: HOME | End: 2020-09-11
Attending: EMERGENCY MEDICINE

## 2020-12-04 ENCOUNTER — TELEMEDICINE CONVERTED (OUTPATIENT)
Dept: INTERNAL MEDICINE | Facility: CLINIC | Age: 45
End: 2020-12-04
Attending: INTERNAL MEDICINE

## 2021-01-19 ENCOUNTER — HOSPITAL ENCOUNTER (OUTPATIENT)
Dept: OTHER | Facility: HOSPITAL | Age: 46
Discharge: HOME | End: 2021-01-19
Attending: INTERNAL MEDICINE

## 2021-01-19 LAB
APPEARANCE UR: CLEAR
BILIRUB UR QL: NEGATIVE
COLOR UR: YELLOW
CONV COLLECTION SOURCE (UA): NORMAL
CONV UROBILINOGEN IN URINE BY AUTOMATED TEST STRIP: 0.2 {EHRLICHU}/DL (ref 0.1–1)
GLUCOSE UR QL: NEGATIVE MG/DL
HGB UR QL STRIP: NEGATIVE
KETONES UR QL STRIP: NEGATIVE MG/DL
LEUKOCYTE ESTERASE UR QL STRIP: NEGATIVE
NITRITE UR QL STRIP: NEGATIVE
PH UR STRIP.AUTO: 6.5 [PH] (ref 5–8)
PROT UR QL: NEGATIVE MG/DL
SP GR UR: 1.01 (ref 1–1.03)

## 2021-01-21 LAB — BACTERIA UR CULT: NORMAL

## 2021-03-09 ENCOUNTER — HOSPITAL ENCOUNTER (OUTPATIENT)
Dept: OTHER | Facility: HOSPITAL | Age: 46
Discharge: HOME | End: 2021-03-09
Attending: PHYSICIAN ASSISTANT

## 2021-03-09 ENCOUNTER — OFFICE VISIT CONVERTED (OUTPATIENT)
Dept: INTERNAL MEDICINE | Facility: CLINIC | Age: 46
End: 2021-03-09
Attending: PHYSICIAN ASSISTANT

## 2021-03-09 LAB
BASOPHILS # BLD AUTO: 0.09 10*3/UL (ref 0–0.2)
BASOPHILS NFR BLD AUTO: 1.4 % (ref 0–3)
CONV ABS IMM GRAN: 0.03 10*3/UL (ref 0–0.2)
CONV IMMATURE GRAN: 0.5 % (ref 0–1.8)
DEPRECATED RDW RBC AUTO: 41.6 FL (ref 36.4–46.3)
EOSINOPHIL # BLD AUTO: 0.17 10*3/UL (ref 0–0.7)
EOSINOPHIL # BLD AUTO: 2.7 % (ref 0–7)
ERYTHROCYTE [DISTWIDTH] IN BLOOD BY AUTOMATED COUNT: 11.8 % (ref 11.7–14.4)
HCT VFR BLD AUTO: 39.9 % (ref 37–47)
HGB BLD-MCNC: 13.5 G/DL (ref 12–16)
LYMPHOCYTES # BLD AUTO: 1.27 10*3/UL (ref 1–5)
LYMPHOCYTES NFR BLD AUTO: 20.3 % (ref 20–45)
MCH RBC QN AUTO: 32.7 PG (ref 27–31)
MCHC RBC AUTO-ENTMCNC: 33.8 G/DL (ref 33–37)
MCV RBC AUTO: 96.6 FL (ref 81–99)
MONOCYTES # BLD AUTO: 0.47 10*3/UL (ref 0.2–1.2)
MONOCYTES NFR BLD AUTO: 7.5 % (ref 3–10)
NEUTROPHILS # BLD AUTO: 4.23 10*3/UL (ref 2–8)
NEUTROPHILS NFR BLD AUTO: 67.6 % (ref 30–85)
NRBC CBCN: 0 % (ref 0–0.7)
PLATELET # BLD AUTO: 248 10*3/UL (ref 130–400)
PMV BLD AUTO: 12 FL (ref 9.4–12.3)
RBC # BLD AUTO: 4.13 10*6/UL (ref 4.2–5.4)
WBC # BLD AUTO: 6.26 10*3/UL (ref 4.8–10.8)

## 2021-03-10 LAB
ALBUMIN SERPL-MCNC: 4 G/DL (ref 3.5–5)
ALBUMIN/GLOB SERPL: 1.3 {RATIO} (ref 1.4–2.6)
ALP SERPL-CCNC: 83 U/L (ref 42–98)
ALT SERPL-CCNC: 15 U/L (ref 10–40)
ANION GAP SERPL CALC-SCNC: 14 MMOL/L (ref 8–19)
AST SERPL-CCNC: 19 U/L (ref 15–50)
BILIRUB SERPL-MCNC: 0.27 MG/DL (ref 0.2–1.3)
BUN SERPL-MCNC: 11 MG/DL (ref 5–25)
BUN/CREAT SERPL: 16 {RATIO} (ref 6–20)
CALCIUM SERPL-MCNC: 9 MG/DL (ref 8.7–10.4)
CHLORIDE SERPL-SCNC: 103 MMOL/L (ref 99–111)
CONV CO2: 26 MMOL/L (ref 22–32)
CONV TOTAL PROTEIN: 7 G/DL (ref 6.3–8.2)
CREAT UR-MCNC: 0.67 MG/DL (ref 0.5–0.9)
GFR SERPLBLD BASED ON 1.73 SQ M-ARVRAT: >60 ML/MIN/{1.73_M2}
GLOBULIN UR ELPH-MCNC: 3 G/DL (ref 2–3.5)
GLUCOSE SERPL-MCNC: 86 MG/DL (ref 65–99)
OSMOLALITY SERPL CALC.SUM OF ELEC: 287 MOSM/KG (ref 273–304)
POTASSIUM SERPL-SCNC: 4.1 MMOL/L (ref 3.5–5.3)
SODIUM SERPL-SCNC: 139 MMOL/L (ref 135–147)
T4 FREE SERPL-MCNC: 1.4 NG/DL (ref 0.9–1.8)
TSH SERPL-ACNC: 0.84 M[IU]/L (ref 0.27–4.2)

## 2021-04-16 ENCOUNTER — HOSPITAL ENCOUNTER (OUTPATIENT)
Dept: OTHER | Facility: HOSPITAL | Age: 46
Discharge: HOME | End: 2021-04-16
Attending: INTERNAL MEDICINE

## 2021-04-16 ENCOUNTER — CONVERSION ENCOUNTER (OUTPATIENT)
Dept: INTERNAL MEDICINE | Facility: CLINIC | Age: 46
End: 2021-04-16

## 2021-04-16 ENCOUNTER — OFFICE VISIT CONVERTED (OUTPATIENT)
Dept: INTERNAL MEDICINE | Facility: CLINIC | Age: 46
End: 2021-04-16
Attending: INTERNAL MEDICINE

## 2021-04-16 LAB
ALBUMIN SERPL-MCNC: 3.9 G/DL (ref 3.5–5)
ALBUMIN/GLOB SERPL: 1.5 {RATIO} (ref 1.4–2.6)
ALP SERPL-CCNC: 60 U/L (ref 42–98)
ALT SERPL-CCNC: 15 U/L (ref 10–40)
AMYLASE SERPL-CCNC: 33 U/L (ref 30–110)
ANION GAP SERPL CALC-SCNC: 10 MMOL/L (ref 8–19)
AST SERPL-CCNC: 19 U/L (ref 15–50)
BASOPHILS # BLD AUTO: 0.07 10*3/UL (ref 0–0.2)
BASOPHILS NFR BLD AUTO: 1.2 % (ref 0–3)
BILIRUB SERPL-MCNC: 0.36 MG/DL (ref 0.2–1.3)
BUN SERPL-MCNC: 12 MG/DL (ref 5–25)
BUN/CREAT SERPL: 16 {RATIO} (ref 6–20)
CALCIUM SERPL-MCNC: 8.6 MG/DL (ref 8.7–10.4)
CHLORIDE SERPL-SCNC: 106 MMOL/L (ref 99–111)
CONV ABS IMM GRAN: 0.03 10*3/UL (ref 0–0.2)
CONV CO2: 25 MMOL/L (ref 22–32)
CONV IMMATURE GRAN: 0.5 % (ref 0–1.8)
CONV TOTAL PROTEIN: 6.5 G/DL (ref 6.3–8.2)
CREAT UR-MCNC: 0.75 MG/DL (ref 0.5–0.9)
DEPRECATED RDW RBC AUTO: 40.3 FL (ref 36.4–46.3)
EOSINOPHIL # BLD AUTO: 0.21 10*3/UL (ref 0–0.7)
EOSINOPHIL # BLD AUTO: 3.7 % (ref 0–7)
ERYTHROCYTE [DISTWIDTH] IN BLOOD BY AUTOMATED COUNT: 11.7 % (ref 11.7–14.4)
GFR SERPLBLD BASED ON 1.73 SQ M-ARVRAT: >60 ML/MIN/{1.73_M2}
GLOBULIN UR ELPH-MCNC: 2.6 G/DL (ref 2–3.5)
GLUCOSE SERPL-MCNC: 94 MG/DL (ref 65–99)
HCT VFR BLD AUTO: 35.7 % (ref 37–47)
HGB BLD-MCNC: 12.4 G/DL (ref 12–16)
LIPASE SERPL-CCNC: 31 U/L (ref 5–51)
LYMPHOCYTES # BLD AUTO: 1.35 10*3/UL (ref 1–5)
LYMPHOCYTES NFR BLD AUTO: 23.8 % (ref 20–45)
MCH RBC QN AUTO: 33.2 PG (ref 27–31)
MCHC RBC AUTO-ENTMCNC: 34.7 G/DL (ref 33–37)
MCV RBC AUTO: 95.5 FL (ref 81–99)
MONOCYTES # BLD AUTO: 0.49 10*3/UL (ref 0.2–1.2)
MONOCYTES NFR BLD AUTO: 8.6 % (ref 3–10)
NEUTROPHILS # BLD AUTO: 3.52 10*3/UL (ref 2–8)
NEUTROPHILS NFR BLD AUTO: 62.2 % (ref 30–85)
NRBC CBCN: 0 % (ref 0–0.7)
OSMOLALITY SERPL CALC.SUM OF ELEC: 284 MOSM/KG (ref 273–304)
PLATELET # BLD AUTO: 238 10*3/UL (ref 130–400)
PMV BLD AUTO: 11.6 FL (ref 9.4–12.3)
POTASSIUM SERPL-SCNC: 3.8 MMOL/L (ref 3.5–5.3)
RBC # BLD AUTO: 3.74 10*6/UL (ref 4.2–5.4)
SODIUM SERPL-SCNC: 137 MMOL/L (ref 135–147)
WBC # BLD AUTO: 5.67 10*3/UL (ref 4.8–10.8)

## 2021-04-18 LAB — CONV CELIAC DISEASE AB-IGA: 258 MG/DL (ref 68–408)

## 2021-04-19 LAB — TTG IGA SER-ACNC: <2 U/ML (ref 0–3)

## 2021-05-12 NOTE — PROGRESS NOTES
Progress Note      Patient Name: Keerthi Pillai   Patient ID: [id number]   Sex: Female   YOB: 1975    Primary Care Provider: Jennifer Ocasio MD   Referring Provider: Jennifer Ocasio MD    Visit Date: April 21, 2020    Provider: Fredis Larkin MD   Location: [facility]   Location Address: [address] [place] [address]  [id number]   Location Phone: [phone]          Chief Complaint  · Follow-up     Complains of low back and right hip pain.       History Of Present Illness  Video Conferencing Visit  Keerthi Pillai is a 45 year old /White female who is presenting for evaluation via video conferencing. Verbal consent obtained before beginning visit.   The following staff were present during this visit: NA   The patient is a 45 year old /White female who is in the office for followup appointment.      Lower back pain for about a year. She has L5-S1 spondylolisthesis. Worse with prolonged walking/standing or laying flat for a long time. It is better with laying on her left side. She has seen some improvement with P.T. She is continuing some home P.T. Her DEXA was normal. No movement on flex-ext.       Past Medical History  Acne; Anxiety; Arthritis; Breast cancer; Cancer; Depression; Forgetfulness; Hemochromatosis; Hypertension, Benign Essential; Lumbago/low back pain; Migraines; Moderate episode of recurrent major depressive disorder; Night sweats; Postmenopausal; Primary osteoarthritis of right knee; Sinus Trouble; unspecified; Spondylolisthesis , lumbar         Past Surgical History  Breast reconstruction with implants; Cesarian Section; Colonoscopy; Fat grafting; Joint Surgery; Knee repair; Mastectomy, bilateral; Port Placement         Medication List  Activella 1-0.5 mg oral tablet; CBD Oil; Celebrex 200 mg oral capsule; hydroxyzine HCl 25 mg oral tablet; lisinopril 20 mg oral tablet; triamcinolone acetonide 0.5 % topical ointment; Trintellix 10 mg oral  tablet         Allergy List  NO KNOWN DRUG ALLERGIES       Allergies Reconciled  Family Medical History  Stroke; Heart Disease; Family history of brain cancer; Family history of certain chronic disabling diseases; arthritis; Family history of Arthritis         Reproductive History   3 Para 2 0 1 2       Social History  Alcohol (Current some day); Alcohol Use (Never); lives with children; lives with spouse; ; .; Moderate Amount of Exercise (1-3 times weekly); No known infection risk; Recreational Drug Use (Never); Tobacco (Never); Working         Immunizations  Name Date Admin   Hepatitis A    Influenza          Review of Systems  · Constitutional  o Denies  o : chills, excessive sweating, fatigue, fever, sycope/passing out, weight gain, weight loss  · Eyes  o Denies  o : changes in vision, blurry vision, double vision  · HENT  o Denies  o : loss of hearing, ringing in the ears, ear aches, sore throat, nasal congestion, sinus pain, nose bleeds, seasonal allergies  · Cardiovascular  o Denies  o : blood clots, swollen legs, anemia, easy burising or bleeding, transfusions  · Respiratory  o Denies  o : shortness of breath, dry cough, productive cough, pneumonia, COPD  · Gastrointestinal  o Denies  o : difficulty swallowing, reflux  · Genitourinary  o Denies  o : incontinence  · Neurologic  o Denies  o : headache, seizure, stroke, tremor, loss of balance, falls, dizziness/vertigo, difficulty with sleep, numbness/tingling/paresthesia , difficulty with coordination, difficulty with dexterity, weakness  · Musculoskeletal  o Admits  o : joint pain, low back pain  o Denies  o : neck stiffness/pain, swollen lymph nodes, muscle aches, weakness, spasms, sciatica, pain radiating in arm, pain radiating in leg  · Endocrine  o Denies  o : diabetes, thyroid disorder  · Psychiatric  o Denies  o : anxiety, depression      Physical Examination  · Constitutional  o Appearance  o : well-nourished, well developed,  alert, in no acute distress     Video visit, no PE performed               Assessment  · Lumbago/low back pain     724.3/M54.40  · Spondylolisthesis , lumbar     738.4/M43.16  L5-S1 stable on flex-ext      Plan  · Medications  o Medications have been Reconciled  o Transition of Care or Provider Policy  · Instructions  o She would like to hold off on surgery for now. She will contact us with any worsening lower back or leg pain. She will work on core strength as well as avoidance of activities that worsen the pain.             Electronically Signed by: Fredis Larkin MD -Author on April 21, 2020 01:36:59 PM

## 2021-05-13 NOTE — PROGRESS NOTES
Progress Note      Patient Name: Keerthi Pillai   Patient ID: [id number]   Sex: Female   YOB: 1975    Primary Care Provider: Jennifer Ocasio MD   Referring Provider: Jennifer Ocasio MD    Visit Date: December 4, 2020    Provider: Jennifer Ocasio MD   Location: [facility]   Location Address: [address] [place] [address]  [id number]   Location Phone: [phone]          Chief Complaint  · Telehealth/zoom      History Of Present Illness  Video Conferencing Visit  Keerthi Pillai is a 45 year old /White female who is presenting for evaluation via video conferencing via zoom. Verbal consent obtained before beginning visit.   The following staff were present during this visit: Jennifer Ocasio MD; Torrie GONZALEZ MA.   Keerthi Pillai is a 45 year old /White female who presents for evaluation and treatment of:      has been having some leg pain  she is using a brace  she is having trouble with numbness  the pain is waking her up at night    HTN-  checks at home from time to time  running well  around 130's or less  no chest pain  no trouble breathing  no leg swelling    stress is doing very well    estrogen/progesterone doing great, still understands the risk of this but states that she is feeling great       Past Medical History  Disease Name Date Onset Notes   Acne --  --    Anxiety --  --    Arthritis --  --    Breast cancer --  --    Cancer --  --    Depression --  --    Forgetfulness --  --    Hemochromatosis 09/03/2018 --    Hypertension, Benign Essential --  --    Lumbago/low back pain 04/21/2020 --    Migraines --  --    Moderate episode of recurrent major depressive disorder 12/10/2017 will adjust lexapro and wellbutrin and see how she does   Night sweats --  --    Postmenopausal 04/21/2020 --    Primary osteoarthritis of right knee 03/07/2018 --    Sinus Trouble; unspecified --  --    Spondylolisthesis , lumbar 04/21/2020 --          Past  Surgical History  Procedure Name Date Notes   Breast reconstruction with implants --  --    Cesarian Section 2007 --    Colonoscopy --  --    Fat grafting 19 to bilateral breasts   Joint Surgery --  --    Knee repair  --    Mastectomy, bilateral --  --    Port Placement --  --          Medication List  Name Date Started Instructions   CBD Oil  take 30 mg BID   celecoxib 200 mg oral capsule 2020 TAKE ONE CAPSULE BY MOUTH EVERY DAY   cyclobenzaprine 10 mg oral tablet 2020 take 1 tablet (10 mg) by oral route 2 times per day   estradiol-norethindrone acet 1-0.5 mg oral tablet 2020 TAKE ONE TABLET BY MOUTH ONCE DAILY   hydroxyzine HCl 25 mg oral tablet 2019 take one tab po QHS   lisinopril 20 mg oral tablet 2020 TAKE 1 TABLET BY MOUTH EVERY DAY   Trintellix 10 mg oral tablet 2020 take 1 tablet (10 mg) by oral route once daily at the same time each day         Allergy List  Allergen Name Date Reaction Notes   NO KNOWN DRUG ALLERGIES --  --  --        Allergies Reconciled  Family Medical History  Disease Name Relative/Age Notes   Stroke  --    Heart Disease  --    Family history of brain cancer  --    Family history of certain chronic disabling diseases; arthritis Father/  Mother/   Mother; Father   Family history of Arthritis Father/  Mother/   Mother; Father         Reproductive History  Menstrual   Age Menarche: 13   Pregnancy Summary   Total Pregnancies: 3 Full Term: 2 Premature: 0   Ab Induced: 0 Ab Spontaneous: 1 Ectopics: 0   Multiples: 0 Livin         Social History  Finding Status Start/Stop Quantity Notes   Alcohol Current some day --/-- --  rarely drinks, less than 1 drink per day, has been drinking for 11-20 years   Alcohol Use Never --/-- --  does not drink   lives with children --  --/-- --  --    lives with spouse --  --/-- --  --     --  --/-- --  --    . --  --/-- --  --    Moderate Amount of Exercise (1-3 times weekly) --  --/-- --  --     No known infection risk --  --/-- --  --    Recreational Drug Use Never --/-- --  no   Tobacco Never --/-- --  never smoker   Working --  --/-- --  --          Immunizations  NameDate Admin Mfg Trade Name Lot Number Route Inj VIS Given VIS Publication   Hepatitis A04/27/2018 SKB HAVRIX-ADULT  IM LD 04/27/2018 07/20/2016   Comments: patient tolerated well, left office in stable condition.   [id number]/02/2019 [facility] Fluzone Quadrivalent [id number] IM LD 12/02/2019    Comments: Tolerated well         Review of Systems  · Constitutional  o Denies  o : fever, fatigue, weight loss, weight gain  · Cardiovascular  o Denies  o : lower extremity edema, claudication, chest pressure, palpitations  · Respiratory  o Denies  o : shortness of breath, wheezing, cough, hemoptysis, dyspnea on exertion  · Gastrointestinal  o Denies  o : nausea, vomiting, diarrhea, constipation, abdominal pain      Physical Examination     Gen: well-nourished, no acute distress  HENT: atraumatic, normocephalic  Eyes: extraocular movements intact, no scleral icterus  Lung: breathing comfortably, no cough  Skin: no visible rash, no lesions  Neuro: grossly oriented to person, place, and time. no facial droop   Psych: normal mood and affect             Assessment  · Hemochromatosis     275.03/E83.119  Stable  · Postmenopausal     V49.81/Z78.0  Doing well on current replacements  · Anxiety     300.02/F41.1  Doing great on current meds continue for now  · Breast cancer     174.9/C50.919  doing well  still following with her oncologist     chronic issues stable, cont current meds     Problems Reconciled  Plan  · Orders  o ACO-39: Current medications updated and reviewed (1159F, ) - - 12/04/2020  · Medications  o celecoxib 200 mg oral capsule   SIG: TAKE ONE CAPSULE BY MOUTH EVERY DAY   DISP: (90) Capsule with 1 refills  Refilled on 12/04/2020     o cyclobenzaprine 10 mg oral tablet   SIG: take 1 tablet (10 mg) by oral route 2 times per day   DISP:  (30) Tablet with 0 refills  Refilled on 12/04/2020     o estradiol-norethindrone acet 1-0.5 mg oral tablet   SIG: TAKE ONE TABLET BY MOUTH ONCE DAILY   DISP: (28) Tablet with 1 refills  Refilled on 12/04/2020     o Trintellix 10 mg oral tablet   SIG: take 1 tablet (10 mg) by oral route once daily at the same time each day   DISP: (60) Tablet with 1 refills  Refilled on 12/04/2020     o Activella 1-0.5 mg oral tablet   SIG: take 1 tablet by oral route once daily   DISP: (30) Tablet with 0 refills  Discontinued on 12/04/2020     o Medications have been Reconciled  o Transition of Care or Provider Policy  · Instructions  o Patient was educated/instructed on their diagnosis, treatment and medications prior to discharge from the clinic today.  · Disposition  o 6 Month Follow Up            Electronically Signed by: Jennifer Ocasio MD -Author on December 27, 2020 03:17:55 PM

## 2021-05-13 NOTE — PROGRESS NOTES
"   Progress Note      Patient Name: Keerthi Pillai   Patient ID: [id number]   Sex: Female   YOB: 1975    Primary Care Provider: Jennifer Ocasio MD   Referring Provider: Jennifer Ocasio MD    Visit Date: May 28, 2020    Provider: Jennifer Ocasio MD   Location: [facility]   Location Address: [address] [place] [address]  [id number]   Location Phone: [phone]          Chief Complaint     \"just following up\"       History Of Present Illness  Video Conferencing Visit  Keerthi Pillai is a 45 year old /White female who is presenting for evaluation via video conferencing via [employer]. Verbal consent obtained before beginning visit.   The following staff were present during this visit: Jennifer Ocasio MD   Keerthi Pillai is a 45 year old /White female who presents for evaluation and treatment of:      chronic issues    states that she is doing really well    likes the activella  no side effects so far  feels like she has more energy and not as anxious  still understands risk of breast cancer    Trintellix is working very well for her  no chest pain  no palpitations       Past Medical History  Disease Name Date Onset Notes   Acne --  --    Anxiety --  --    Arthritis --  --    Breast cancer --  --    Cancer --  --    Depression --  --    Forgetfulness --  --    Hemochromatosis 09/03/2018 --    Hypertension, Benign Essential --  --    Lumbago/low back pain 04/21/2020 --    Migraines --  --    Moderate episode of recurrent major depressive disorder 12/10/2017 will adjust lexapro and wellbutrin and see how she does   Night sweats --  --    Postmenopausal 04/21/2020 --    Primary osteoarthritis of right knee 03/07/2018 --    Sinus Trouble; unspecified --  --    Spondylolisthesis , lumbar 04/21/2020 --          Past Surgical History  Procedure Name Date Notes   Breast reconstruction with implants --  --    Cesarian Section 2007 2010 --    Colonoscopy " --  --    Fat grafting 19 to bilateral breasts   Joint Surgery --  --    Knee repair  --    Mastectomy, bilateral --  --    Port Placement --  --          Medication List  Name Date Started Instructions   Activella 1-0.5 mg oral tablet 2020 take 1 tablet by oral route once daily   CBD Oil  take 30 mg BID   Celebrex 200 mg oral capsule  take 1 capsule (200 mg) by oral route once daily   hydroxyzine HCl 25 mg oral tablet 2019 take one tab po QHS   lisinopril 20 mg oral tablet 2020 take 1 tablet (20 mg) by oral route once daily   triamcinolone acetonide 0.5 % topical ointment 2020 apply a thin layer to the affected area(s) by topical route 2 times per day for 30 days   Trintellix 10 mg oral tablet 2020 take 1 tablet (10 mg) by oral route once daily at the same time each day         Allergy List  Allergen Name Date Reaction Notes   NO KNOWN DRUG ALLERGIES --  --  --          Family Medical History  Disease Name Relative/Age Notes   Stroke  --    Heart Disease  --    Family history of brain cancer  --    Family history of certain chronic disabling diseases; arthritis Father/  Mother/   Mother; Father   Family history of Arthritis Father/  Mother/   Mother; Father         Reproductive History  Menstrual   Age Menarche: 13   Pregnancy Summary   Total Pregnancies: 3 Full Term: 2 Premature: 0   Ab Induced: 0 Ab Spontaneous: 1 Ectopics: 0   Multiples: 0 Livin         Social History  Finding Status Start/Stop Quantity Notes   Alcohol Current some day --/-- --  rarely drinks, less than 1 drink per day, has been drinking for 11-20 years   Alcohol Use Never --/-- --  does not drink   lives with children --  --/-- --  --    lives with spouse --  --/-- --  --     --  --/-- --  --    . --  --/-- --  --    Moderate Amount of Exercise (1-3 times weekly) --  --/-- --  --    No known infection risk --  --/-- --  --    Recreational Drug Use Never --/-- --  no   Tobacco Never --/--  --  never smoker   Working --  --/-- --  --          Immunizations  NameDate Admin Mfg Trade Name Lot Number Route Inj VIS Given VIS Publication   Hepatitis A04/27/2018 SKB HAVRIX-ADULT  IM LD 04/27/2018 07/20/2016   Comments: patient tolerated well, left office in stable condition.   [id number]/02/2019 [facility] Fluzone Quadrivalent [id number] IM LD 12/02/2019    Comments: Tolerated well         Review of Systems  · Constitutional  o Denies  o : fever, fatigue, weight loss, weight gain  · Cardiovascular  o Denies  o : lower extremity edema, claudication, chest pressure, palpitations  · Respiratory  o Denies  o : shortness of breath, wheezing, cough, hemoptysis, dyspnea on exertion  · Gastrointestinal  o Denies  o : nausea, vomiting, diarrhea, constipation, abdominal pain      Physical Examination     Gen: well-nourished, no acute distress  HENT: atraumatic, normocephalic  Eyes: extraocular movements intact, no scleral icterus  Lung: breathing comfortably, no cough  Skin: no visible rash, no lesions  Neuro: grossly oriented to person, place, and time. no facial droop   Psych: normal mood and affect             Assessment  · Moderate episode of recurrent major depressive disorder     296.32/F33.1  will adjust lexapro and wellbutrin and see how she does  · Anxiety     300.02/F41.1  · Breast cancer     174.9/C50.919       cont current meds  she is doing great  cont to monitor breast cancer symptoms very closely due to risk of activella  however otherwise doing great     Problems Reconciled  Plan  · Orders  o ACO-39: Current medications updated and reviewed () - - 05/28/2020  · Instructions  o Patient was educated/instructed on their diagnosis, treatment and medications prior to discharge from the clinic today.            Electronically Signed by: Jennifer Ocasio MD -Author on May 31, 2020 06:13:39 PM

## 2021-05-14 VITALS
WEIGHT: 168.12 LBS | HEART RATE: 67 BPM | OXYGEN SATURATION: 99 % | TEMPERATURE: 97.3 F | SYSTOLIC BLOOD PRESSURE: 122 MMHG | BODY MASS INDEX: 31.74 KG/M2 | HEIGHT: 61 IN | DIASTOLIC BLOOD PRESSURE: 76 MMHG

## 2021-05-14 VITALS
HEART RATE: 64 BPM | WEIGHT: 167.5 LBS | SYSTOLIC BLOOD PRESSURE: 128 MMHG | BODY MASS INDEX: 31.63 KG/M2 | HEIGHT: 61 IN | OXYGEN SATURATION: 98 % | TEMPERATURE: 98.4 F | DIASTOLIC BLOOD PRESSURE: 78 MMHG

## 2021-05-14 NOTE — PROGRESS NOTES
Progress Note      Patient Name: Keerthi Pillai   Patient ID: [id number]   Sex: Female   YOB: 1975    Primary Care Provider: Jennifer Ocasio MD   Referring Provider: Jennifer Ocasio MD    Visit Date: April 16, 2021    Provider: Jennifer Ocasio MD   Location: [facility]   Location Address: [address] [place] [address]  [id number]   Location Phone: [phone]          Chief Complaint  · f/u anxiety      History Of Present Illness  Keerthi Pillai is a 46 year old /White female who presents for evaluation and treatment of:      Chronic issues.    States that her anxiety did not improve with addition of the BuSpar  Reviewed note from Ms. Worthy for her last visit in our office  She had decreased her Trintellix prior to this  No chest pain  No trouble breathing  [place] she has been experiencing significant diarrhea states that several other members in her family experience this as well however hers just has not gone away it has been greater than 10 days it is mostly watery with some cramping no severe abdominal pain       Past Medical History  Disease Name Date Onset Notes   Acne --  --    Anxiety --  --    Arthritis --  --    Breast cancer --  --    Cancer --  --    Depression --  --    Forgetfulness --  --    Hemochromatosis 09/03/2018 --    Hypertension, Benign Essential --  --    Lumbago/low back pain 04/21/2020 --    Migraines --  --    Moderate episode of recurrent major depressive disorder 12/10/2017 will adjust lexapro and wellbutrin and see how she does   Night sweats --  --    Postmenopausal 04/21/2020 --    Primary osteoarthritis of right knee 03/07/2018 --    Sinus Trouble; unspecified --  --    Spondylolisthesis , lumbar 04/21/2020 --          Past Surgical History  Procedure Name Date Notes   Breast reconstruction with implants --  --    Cesarian Section 2007 2010 --    Colonoscopy --  --    Fat grafting 09/4/19 to  bilateral breasts   Joint Surgery --  --    Knee repair  --    Mastectomy, bilateral --  --    Port Placement --  --          Medication List  Name Date Started Instructions   CBD Oil  take 30 mg BID   celecoxib 200 mg oral capsule 2020 TAKE ONE CAPSULE BY MOUTH EVERY DAY   cyclobenzaprine 10 mg oral tablet 2020 take 1 tablet (10 mg) by oral route 2 times per day   estradiol-norethindrone acet 1-0.5 mg oral tablet 2021 TAKE ONE TABLET BY MOUTH ONCE DAILY for 90 days   hydroxyzine HCl 25 mg oral tablet 2019 take one tab po QHS   lisinopril 20 mg oral tablet 2021 TAKE 1 TABLET BY MOUTH EVERY DAY   Trintellix 10 mg oral tablet 2021 take 1 tablet (10 mg) by oral route once daily at the same time each day         Allergy List  Allergen Name Date Reaction Notes   NO KNOWN DRUG ALLERGIES --  --  --        Allergies Reconciled  Family Medical History  Disease Name Relative/Age Notes   Stroke  --    Heart Disease  --    Family history of brain cancer  --    Family history of certain chronic disabling diseases; arthritis Father/  Mother/   Mother; Father   Family history of Arthritis Father/  Mother/   Mother; Father         Reproductive History  Menstrual   Age Menarche: 13   Pregnancy Summary   Total Pregnancies: 3 Full Term: 2 Premature: 0   Ab Induced: 0 Ab Spontaneous: 1 Ectopics: 0   Multiples: 0 Livin         Social History  Finding Status Start/Stop Quantity Notes   Alcohol Current some day --/-- --  rarely drinks, less than 1 drink per day, has been drinking for 11-20 years   Alcohol Use Never --/-- --  does not drink   lives with children --  --/-- --  --    lives with spouse --  --/-- --  --     --  --/-- --  --    . --  --/-- --  --    Moderate Amount of Exercise (1-3 times weekly) --  --/-- --  --    No known infection risk --  --/-- --  --    Recreational Drug Use Never --/-- --  no   Tobacco Never --/-- --  never smoker   Working --  --/-- --  --   "        Immunizations  NameDate Admin Mfg Trade Name Lot Number Route Inj VIS Given VIS Publication   Hepatitis A04/27/2018 [facility] HAVRIX-ADULT  IM LD 04/27/2018 07/20/2016   Comments: patient tolerated well, left office in stable condition.   Dtbdedsrz67/02/2019 [facility] Fluzone Quadrivalent [id number] IM LD 12/02/2019    Comments: Tolerated well         Vitals  Date Time BP Position Site L\R Cuff Size HR RR TEMP (F) WT  HT  BMI kg/m2 BSA m2 O2 Sat FR L/min FiO2 HC       02/20/2020 04:16 /68 Sitting    66 - R  97.8 172lbs 6oz 5'  1\" 32.57 1.83 97 %      02/25/2020 01:57 PM      78 - R   178lbs 0oz 5'  1\" 33.63 1.86       03/09/2021 09:36 /76 Sitting    67 - R  97.3 168lbs 2oz 5'  1\" 31.77 1.81 99 %  21%    04/16/2021 02:03 /78 Sitting    64 - R  98.4 167lbs 8oz 5'  1\" 31.65 1.81 98 %  21%          Physical Examination  · Constitutional  o Appearance  o : no acute distress, well-nourished  · Head and Face  o Head  o :   § Inspection  § : atraumatic, normocephalic  · Eyes  o Eyes  o : extraocular movements intact, no scleral icterus, no conjunctival injection  · Respiratory  o Respiratory Effort  o : breathing comfortably, symmetric chest rise  o Auscultation of Lungs  o : clear to asculatation bilaterally, no wheezes, rales, or rhonchii  · Cardiovascular  o Heart  o :   § Auscultation of Heart  § : regular rate and rhythm, no murmurs, rubs, or gallops  o Peripheral Vascular System  o :   § Extremities  § : no edema  · Gastrointestinal  o Abdominal Examination  o :   § Abdomen  § : bowel sounds slightly increased , non-distended, non-tender  · Neurologic  o Mental Status Examination  o :   § Orientation  § : grossly oriented to person, place and time  o Gait and Station  o :   § Gait Screening  § : normal gait  · Psychiatric  o General  o : normal mood and affect          Assessment  · Arthritis     716.90/M19.90  Will increase Trintellix to 20  · Diarrhea     787.91/R19.7  Will order stool studies and " blood work for further etiology  Will start probiotic therapy as well  · Essential hypertension     401.9/I10  Blood pressure has been slightly elevated would recommend increasing lisinopril  · Screening for depression     V79.0/Z13.89    Problems Reconciled  Plan  · Orders  o Giardia and Cryptosporidium Enzyme Immunoassay [facility] (69440, 47120) - 787.91/R19.7 - 04/16/2021  o Stool culture and sensitivity (91427) - 787.91/R19.7 - 04/16/2021  o C difficile Toxigenic Assay (PCR) [facility] (76887) - 787.91/R19.7 - 04/16/2021  o Lactoferrin (Fecal) Qualitative (20173) - 787.91/R19.7 - 04/16/2021  o Amylase + lipase (26048, 79373) - 787.91/R19.7 - 04/16/2021  o CBC with Auto Diff [facility] (52070) - 787.91/R19.7 - 04/16/2021  o CMP [facility] (79061) - 787.91/R19.7 - 04/16/2021  o Celiac Disease Antibody Panel(Profile) (CELID) - 787.91/R19.7 - 04/16/2021  o ACO-39: Current medications updated and reviewed (, 1159F) - - 04/16/2021  · Medications  o lisinopril 20 mg oral tablet   SIG: TAKE 1 TABLET BY MOUTH EVERY DAY   DISP: (90) Tablet with 1 refills  Refilled on 04/16/2021     o Trintellix 10 mg oral tablet   SIG: take 1 tablet (10 mg) by oral route once daily at the same time each day   DISP: (60) Tablet with 1 refills  Refilled on 04/16/2021     o buspirone 10 mg oral tablet   SIG: take 1 tablet (10 mg) by oral route 2 times per day for 30 days   DISP: (60) Tablet with 1 refills  Discontinued on 04/16/2021     o Medications have been Reconciled  o Transition of Care or Provider Policy  · Instructions  o Depression Screen completed and scanned into the EMR under the designated folder within the patient's documents.  o Patient was educated/instructed on their diagnosis, treatment and medications prior to discharge from the clinic today.            Electronically Signed by: Jennifer Ocasio MD -Author on April 19, 2021 08:11:09 AM

## 2021-05-15 VITALS
BODY MASS INDEX: 32.47 KG/M2 | TEMPERATURE: 98.3 F | SYSTOLIC BLOOD PRESSURE: 132 MMHG | DIASTOLIC BLOOD PRESSURE: 94 MMHG | HEART RATE: 62 BPM | HEIGHT: 61 IN | WEIGHT: 172 LBS | OXYGEN SATURATION: 99 %

## 2021-05-15 VITALS
OXYGEN SATURATION: 100 % | DIASTOLIC BLOOD PRESSURE: 79 MMHG | HEART RATE: 83 BPM | SYSTOLIC BLOOD PRESSURE: 135 MMHG | WEIGHT: 157.25 LBS | HEIGHT: 61 IN | BODY MASS INDEX: 29.69 KG/M2

## 2021-05-15 VITALS — HEIGHT: 61 IN | BODY MASS INDEX: 30.49 KG/M2 | WEIGHT: 161.5 LBS

## 2021-05-15 VITALS
HEART RATE: 68 BPM | HEIGHT: 61 IN | WEIGHT: 164 LBS | BODY MASS INDEX: 30.96 KG/M2 | SYSTOLIC BLOOD PRESSURE: 159 MMHG | OXYGEN SATURATION: 99 % | DIASTOLIC BLOOD PRESSURE: 89 MMHG

## 2021-05-15 VITALS
OXYGEN SATURATION: 98 % | BODY MASS INDEX: 30.55 KG/M2 | SYSTOLIC BLOOD PRESSURE: 137 MMHG | DIASTOLIC BLOOD PRESSURE: 81 MMHG | WEIGHT: 166 LBS | HEIGHT: 62 IN | HEART RATE: 83 BPM

## 2021-05-15 VITALS
HEIGHT: 61 IN | TEMPERATURE: 97.3 F | DIASTOLIC BLOOD PRESSURE: 98 MMHG | BODY MASS INDEX: 31.81 KG/M2 | WEIGHT: 168.5 LBS | HEART RATE: 84 BPM | OXYGEN SATURATION: 95 % | SYSTOLIC BLOOD PRESSURE: 140 MMHG

## 2021-05-15 VITALS — BODY MASS INDEX: 29.83 KG/M2 | HEART RATE: 73 BPM | HEIGHT: 61 IN | OXYGEN SATURATION: 99 % | WEIGHT: 158 LBS

## 2021-05-15 VITALS
RESPIRATION RATE: 16 BRPM | HEART RATE: 72 BPM | BODY MASS INDEX: 32.69 KG/M2 | DIASTOLIC BLOOD PRESSURE: 80 MMHG | HEIGHT: 61 IN | TEMPERATURE: 96.9 F | WEIGHT: 173.12 LBS | SYSTOLIC BLOOD PRESSURE: 122 MMHG | OXYGEN SATURATION: 100 %

## 2021-05-15 VITALS
DIASTOLIC BLOOD PRESSURE: 93 MMHG | HEIGHT: 61 IN | SYSTOLIC BLOOD PRESSURE: 135 MMHG | HEART RATE: 74 BPM | BODY MASS INDEX: 32.31 KG/M2 | OXYGEN SATURATION: 97 % | WEIGHT: 171.12 LBS

## 2021-05-15 VITALS
SYSTOLIC BLOOD PRESSURE: 122 MMHG | TEMPERATURE: 97.8 F | HEIGHT: 61 IN | HEART RATE: 66 BPM | DIASTOLIC BLOOD PRESSURE: 68 MMHG | BODY MASS INDEX: 32.55 KG/M2 | OXYGEN SATURATION: 97 % | WEIGHT: 172.37 LBS

## 2021-05-15 VITALS
HEART RATE: 77 BPM | OXYGEN SATURATION: 98 % | OXYGEN SATURATION: 96 % | WEIGHT: 166.5 LBS | TEMPERATURE: 98 F | HEIGHT: 61 IN | HEIGHT: 61 IN | BODY MASS INDEX: 31.43 KG/M2 | SYSTOLIC BLOOD PRESSURE: 176 MMHG | HEART RATE: 79 BPM | BODY MASS INDEX: 31.43 KG/M2 | DIASTOLIC BLOOD PRESSURE: 102 MMHG | SYSTOLIC BLOOD PRESSURE: 162 MMHG | WEIGHT: 166.5 LBS | DIASTOLIC BLOOD PRESSURE: 95 MMHG

## 2021-05-15 VITALS
TEMPERATURE: 97.9 F | RESPIRATION RATE: 16 BRPM | BODY MASS INDEX: 31.36 KG/M2 | HEIGHT: 61 IN | SYSTOLIC BLOOD PRESSURE: 128 MMHG | OXYGEN SATURATION: 99 % | HEART RATE: 72 BPM | WEIGHT: 166.12 LBS | DIASTOLIC BLOOD PRESSURE: 74 MMHG

## 2021-05-15 VITALS
SYSTOLIC BLOOD PRESSURE: 119 MMHG | BODY MASS INDEX: 31.41 KG/M2 | HEIGHT: 61 IN | RESPIRATION RATE: 16 BRPM | HEART RATE: 70 BPM | DIASTOLIC BLOOD PRESSURE: 79 MMHG | WEIGHT: 166.37 LBS | TEMPERATURE: 98.4 F | OXYGEN SATURATION: 99 %

## 2021-05-15 VITALS — WEIGHT: 178 LBS | HEIGHT: 61 IN | BODY MASS INDEX: 33.61 KG/M2 | HEART RATE: 78 BPM

## 2021-05-15 VITALS
TEMPERATURE: 98.1 F | HEART RATE: 86 BPM | WEIGHT: 161 LBS | SYSTOLIC BLOOD PRESSURE: 110 MMHG | DIASTOLIC BLOOD PRESSURE: 68 MMHG | OXYGEN SATURATION: 100 %

## 2021-05-16 VITALS
DIASTOLIC BLOOD PRESSURE: 68 MMHG | HEIGHT: 61 IN | WEIGHT: 158.5 LBS | TEMPERATURE: 97.9 F | SYSTOLIC BLOOD PRESSURE: 123 MMHG | RESPIRATION RATE: 16 BRPM | OXYGEN SATURATION: 99 % | HEART RATE: 68 BPM | BODY MASS INDEX: 29.92 KG/M2

## 2021-05-16 VITALS
TEMPERATURE: 97.6 F | OXYGEN SATURATION: 97 % | DIASTOLIC BLOOD PRESSURE: 90 MMHG | WEIGHT: 165 LBS | RESPIRATION RATE: 15 BRPM | SYSTOLIC BLOOD PRESSURE: 140 MMHG | HEART RATE: 57 BPM | BODY MASS INDEX: 31.15 KG/M2 | HEIGHT: 61 IN

## 2021-05-16 VITALS
TEMPERATURE: 98 F | BODY MASS INDEX: 29.85 KG/M2 | OXYGEN SATURATION: 99 % | RESPIRATION RATE: 16 BRPM | HEART RATE: 85 BPM | WEIGHT: 158.12 LBS | DIASTOLIC BLOOD PRESSURE: 76 MMHG | HEIGHT: 61 IN | SYSTOLIC BLOOD PRESSURE: 112 MMHG

## 2021-05-16 VITALS
DIASTOLIC BLOOD PRESSURE: 87 MMHG | HEIGHT: 61 IN | OXYGEN SATURATION: 96 % | BODY MASS INDEX: 29.83 KG/M2 | HEART RATE: 82 BPM | WEIGHT: 158 LBS | SYSTOLIC BLOOD PRESSURE: 130 MMHG

## 2021-05-16 VITALS
HEIGHT: 61 IN | DIASTOLIC BLOOD PRESSURE: 70 MMHG | RESPIRATION RATE: 16 BRPM | WEIGHT: 155.5 LBS | TEMPERATURE: 97.5 F | BODY MASS INDEX: 29.36 KG/M2 | HEART RATE: 93 BPM | SYSTOLIC BLOOD PRESSURE: 98 MMHG | OXYGEN SATURATION: 100 %

## 2021-05-16 VITALS
HEART RATE: 78 BPM | TEMPERATURE: 98.3 F | OXYGEN SATURATION: 98 % | DIASTOLIC BLOOD PRESSURE: 78 MMHG | HEIGHT: 61 IN | WEIGHT: 162 LBS | BODY MASS INDEX: 30.58 KG/M2 | SYSTOLIC BLOOD PRESSURE: 126 MMHG

## 2021-05-16 VITALS — WEIGHT: 168 LBS | OXYGEN SATURATION: 98 % | BODY MASS INDEX: 31.72 KG/M2 | HEIGHT: 61 IN | HEART RATE: 93 BPM

## 2021-05-16 VITALS
SYSTOLIC BLOOD PRESSURE: 120 MMHG | BODY MASS INDEX: 31.18 KG/M2 | OXYGEN SATURATION: 98 % | RESPIRATION RATE: 16 BRPM | TEMPERATURE: 98 F | DIASTOLIC BLOOD PRESSURE: 72 MMHG | WEIGHT: 165.12 LBS | HEIGHT: 61 IN | HEART RATE: 70 BPM

## 2021-05-16 VITALS
HEART RATE: 80 BPM | BODY MASS INDEX: 28.72 KG/M2 | WEIGHT: 152.12 LBS | SYSTOLIC BLOOD PRESSURE: 116 MMHG | TEMPERATURE: 97.7 F | DIASTOLIC BLOOD PRESSURE: 66 MMHG | HEIGHT: 61 IN | OXYGEN SATURATION: 97 % | RESPIRATION RATE: 16 BRPM

## 2021-05-16 VITALS — HEART RATE: 97 BPM | WEIGHT: 152 LBS | BODY MASS INDEX: 27.97 KG/M2 | OXYGEN SATURATION: 96 % | HEIGHT: 62 IN

## 2021-05-16 VITALS
BODY MASS INDEX: 30.02 KG/M2 | RESPIRATION RATE: 16 BRPM | WEIGHT: 159 LBS | TEMPERATURE: 97.9 F | DIASTOLIC BLOOD PRESSURE: 64 MMHG | HEART RATE: 72 BPM | HEIGHT: 61 IN | OXYGEN SATURATION: 100 % | SYSTOLIC BLOOD PRESSURE: 118 MMHG

## 2021-05-16 VITALS
BODY MASS INDEX: 29.9 KG/M2 | RESPIRATION RATE: 16 BRPM | DIASTOLIC BLOOD PRESSURE: 92 MMHG | HEIGHT: 61 IN | WEIGHT: 158.37 LBS | HEART RATE: 60 BPM | SYSTOLIC BLOOD PRESSURE: 115 MMHG | TEMPERATURE: 98.7 F | OXYGEN SATURATION: 100 %

## 2021-05-16 VITALS
OXYGEN SATURATION: 94 % | BODY MASS INDEX: 29.83 KG/M2 | WEIGHT: 158 LBS | HEIGHT: 61 IN | HEART RATE: 69 BPM | SYSTOLIC BLOOD PRESSURE: 143 MMHG | DIASTOLIC BLOOD PRESSURE: 96 MMHG

## 2021-05-16 VITALS — OXYGEN SATURATION: 98 % | WEIGHT: 158 LBS | BODY MASS INDEX: 29.83 KG/M2 | HEART RATE: 76 BPM | HEIGHT: 61 IN

## 2021-05-16 VITALS — OXYGEN SATURATION: 98 % | WEIGHT: 161 LBS | HEART RATE: 55 BPM | HEIGHT: 61 IN | BODY MASS INDEX: 30.4 KG/M2

## 2021-05-16 VITALS
TEMPERATURE: 98.1 F | RESPIRATION RATE: 16 BRPM | OXYGEN SATURATION: 98 % | HEIGHT: 61 IN | SYSTOLIC BLOOD PRESSURE: 142 MMHG | WEIGHT: 160.06 LBS | BODY MASS INDEX: 30.22 KG/M2 | DIASTOLIC BLOOD PRESSURE: 86 MMHG | HEART RATE: 61 BPM

## 2021-05-16 VITALS
HEART RATE: 62 BPM | BODY MASS INDEX: 30.44 KG/M2 | RESPIRATION RATE: 16 BRPM | TEMPERATURE: 97.9 F | SYSTOLIC BLOOD PRESSURE: 118 MMHG | DIASTOLIC BLOOD PRESSURE: 64 MMHG | OXYGEN SATURATION: 100 % | HEIGHT: 61 IN | WEIGHT: 161.25 LBS

## 2021-05-16 VITALS
HEART RATE: 65 BPM | SYSTOLIC BLOOD PRESSURE: 110 MMHG | TEMPERATURE: 97.9 F | OXYGEN SATURATION: 99 % | RESPIRATION RATE: 15 BRPM | DIASTOLIC BLOOD PRESSURE: 70 MMHG | HEIGHT: 61 IN | WEIGHT: 159.37 LBS | BODY MASS INDEX: 30.09 KG/M2

## 2021-05-16 VITALS
SYSTOLIC BLOOD PRESSURE: 119 MMHG | BODY MASS INDEX: 28.36 KG/M2 | HEART RATE: 59 BPM | DIASTOLIC BLOOD PRESSURE: 62 MMHG | HEIGHT: 62 IN | WEIGHT: 154.12 LBS

## 2021-05-16 VITALS
BODY MASS INDEX: 28.75 KG/M2 | SYSTOLIC BLOOD PRESSURE: 110 MMHG | DIASTOLIC BLOOD PRESSURE: 60 MMHG | HEIGHT: 61 IN | HEART RATE: 90 BPM | OXYGEN SATURATION: 98 % | TEMPERATURE: 97.4 F | WEIGHT: 152.25 LBS

## 2021-05-16 VITALS — WEIGHT: 152.12 LBS | BODY MASS INDEX: 28.72 KG/M2 | HEART RATE: 74 BPM | OXYGEN SATURATION: 98 % | HEIGHT: 61 IN

## 2021-05-16 VITALS
DIASTOLIC BLOOD PRESSURE: 72 MMHG | HEIGHT: 61 IN | TEMPERATURE: 96.4 F | WEIGHT: 155.5 LBS | SYSTOLIC BLOOD PRESSURE: 102 MMHG | OXYGEN SATURATION: 98 % | RESPIRATION RATE: 16 BRPM | BODY MASS INDEX: 29.36 KG/M2 | HEART RATE: 60 BPM

## 2021-05-16 VITALS
BODY MASS INDEX: 29.64 KG/M2 | HEIGHT: 61 IN | WEIGHT: 157 LBS | DIASTOLIC BLOOD PRESSURE: 64 MMHG | SYSTOLIC BLOOD PRESSURE: 115 MMHG

## 2021-05-28 VITALS
HEART RATE: 76 BPM | OXYGEN SATURATION: 98 % | SYSTOLIC BLOOD PRESSURE: 139 MMHG | TEMPERATURE: 97.6 F | DIASTOLIC BLOOD PRESSURE: 77 MMHG | BODY MASS INDEX: 28.6 KG/M2 | RESPIRATION RATE: 18 BRPM | WEIGHT: 155.42 LBS | HEIGHT: 62 IN

## 2021-05-28 VITALS
OXYGEN SATURATION: 100 % | DIASTOLIC BLOOD PRESSURE: 54 MMHG | BODY MASS INDEX: 28.93 KG/M2 | HEIGHT: 62 IN | TEMPERATURE: 97.8 F | SYSTOLIC BLOOD PRESSURE: 125 MMHG | HEART RATE: 77 BPM | WEIGHT: 157.19 LBS

## 2021-05-28 VITALS
HEART RATE: 71 BPM | DIASTOLIC BLOOD PRESSURE: 89 MMHG | HEIGHT: 62 IN | RESPIRATION RATE: 18 BRPM | TEMPERATURE: 98 F | OXYGEN SATURATION: 96 % | BODY MASS INDEX: 30.18 KG/M2 | SYSTOLIC BLOOD PRESSURE: 156 MMHG | WEIGHT: 164.02 LBS

## 2021-05-28 NOTE — PROGRESS NOTES
Patient: KINJAL BAGLEY     Acct: [id number]     Report: #[id number]  UNIT #: [id number]     : 1975    Encounter Date:2019  PRIMARY CARE: JAMSHID BUTCHER  ***Signed***  --------------------------------------------------------------------------------------------------------------------  NURSE INTAKE      Visit Type      Established Patient Visit            Chief Complaint      BREAST CANCER            Referring Provider/Copies To      Referring Provider:  JAMSHID BUTCHER      Primary Care Provider:  JAMSHID BUTCHER            History and Present Illness      Past Oncology Illness History      This is a very pleasant 43-year-old female who presents for breast cancer.      T2N0M0 G3.  Treatment recommendations by Dr. Herrera were to continue annual     PET/CT.  Also follow-up tumor markers.            -.  Patient is status post right breast biopsy at 4:00 with     pathology demonstrating a grade 3 invasive ductal carcinoma triple negative.  ER    negative, PA negative, HER-2/pablo negative      -.  PET/CT showed a mass in the right breast with increased     metabolic activity with an SUV of 15.  Negative metabolically active axillary     lymph nodes.      - through .  Patient was treated with neoadjuvant     dose dense Adriamycin and Cytoxan followed by dose dense Taxol.      -2016.  Post neoadjuvant therapy PET/CT scan and MRI of the breast with     near complete response.      -.  Patient underwent bilateral skin sparing mastectomy by Dr. Trujillo with pathology revealing no residual malignancy in the right breast.      Left breast and left sentinel lymph nodes were also negative.  Patient deferred     adjuvant radiation therapy.      -[id number] 1-2016.  Received Xeloda 1500 mg by mouth twice a day 2     weeks on and one-week off for 6 cycles based on [employer] clinical trial.      Treatment was complicated by worsening  diarrhea and rash.  Therefore Xeloda     decreased at thousand milligrams by mouth twice a day.      -July 31, 2018. Elevated ferritin. Diagnosed with hemochromatosis. 2 copies of     C82Y.       -September .  PET/CT scan showed no cancer.  WBC 5.44.  Hemoglobin 14.9.     Platelet count 215,000.  Iron 208.  TIBC 240.  Ferritin 451      -November 9, 2018. Ferritin 542 phlebotomy       -December 14, 2018. Ferritin 347, phlebotomy      -January 11, 2019. Ferritin 379 phlebotomy            [facility]      Presents for follow up for  hemochromatosis today.             Hemochromatosis:  2 copies of C82Y, diagnosed in July 2018. History of     hemochromatosis. Family history of hemochromatosis in grandfather. Last     Phlebotomy on January 11. Receiving phlebotomy every month to keep ferritin less    than 100. Ferritin: November 9, 2018: 542, December 11, 2018: 370, January 11, 2019 379.  She has received phelbotomy monthly for the last 3 months.       She does have complaints of joint pain periodically. Denies any headaches, chest    pain or abdominal pain. Today, we discussed increasing her phlebotomy to every     week until her ferritin is less than 100. We discussed avoid any foods that will    increase her ferritin levels: spinach, foods rich in Vitamin C, and avoid any     MVI containing iron and vitamin C. PET scan in September showed no concern for     hepatocellular carcinoma.             History of breast cancer: Currently, monitored every 6 months for this. She will    follow up in April for both breast cancer and hemochromatosis.            Treatments      Chemotherapy      AC x 4 cycles, followed by Paclitaxel x 12 cycles in 2015 in [place] [facility].       Followed by Xeloda 1500 mg x 6 cycles on June 1/2016.            PAST, FAMILY   Past Medical History      Other PMH      hemochromatosis, 2 copies of C282Y.      Hematology/Oncology (F):  Breast  Cancer            Past Surgical History      Fracture Repair, Mastectomy Bilateral      RECONSTRUCTION SURGERY,  X2            Social History      Marital Status:        Lives independently:  No      Occupation:  [employer] kathy [employer]            Tobacco Use      Tobacco status:  Never smoker            Alcohol Use      Alcohol intake:  occassionally            Substance Use      Substance use:  Denies use            REVIEW OF SYSTEMS      General:  Denies: Appetite Change, Fatigue, Fever, Night Sweats, Weight Gain,     Weight Loss      Eye:  Denies: Blurred Vision, Corrective Lenses, Diplopia, Eye Irritation, Eye     Pain, Eye Redness, Spots in Vision, Vision Loss      ENT:  Denies: Headache, Hearing Loss, Hoarseness, Seizures, Sinus Congestion,     Sore Throat      Cardiovascular:  Denies: Chest Pain, Edema Ankles, Edema Legs, Irregular     Heartbeat, Palpitations      Respiratory:  Denies: Coughing Blood, Productive Cough, Shortness of Air,     Wheezing      Gastrointestinal:  Denies: Bloody Stools, Constipation, Diarrhea, Frequent     Heartburn, Nausea, Problem Swallowing, Tarry Stools, Unable to Control Bowels,     Vomiting      Genitourinary (female):  Denies: Blood in Urine, Decrease Urine Stream, Frequent    Urination, Incontinence, Painful Urination      Musculoskeletal:  Denies: Back Pain, Leg Cramps, Muscle Pain, Muscle Weakness,     Painful Joints, Swollen Joints      Integumentary:  Denies: Bleeds Easily, Bruises Easily, Hair Changes, Jaundice,     Lesions, Mole Changes, Nail Changes, Pigment Changes, Rash, Skin Discoloration      Neurologic:  Denies: Dizziness, Fainting, Numbness\Tingling, Paralysis, Seizures      Psychiatric:  Denies: Anxiety, Confused, Depression, Disoriented, Memory Loss      Endocrine:  Denies: Cold Intolerance, Diabetes, Excessive Sweating, Excessive     Thirst, Excessive Urination, Heat Intolerance, Flushing, Hyperthyroidism,     Hypothyroidism      Hematologic/Lymphatic:   Denies: Bruising, Bleeding, Enlarged Lymph Nodes,     Recurrent Infections, Transfusions      Reproductive:  Admits: Menopause;          Denies: Heavy Periods, Pregnant, Still Menstruating            VITAL SIGNS AND SCORES      Vitals      Height 5 ft 2.00 in / 157.48 cm      Weight 155 lbs 6.789 oz / 70.5 kg      BSA 1.72 m2      BMI 28.4 kg/m2      Temperature 97.6 F / 36.44 C - Temporal      Pulse 76      Respirations 18      Blood Pressure 139/77 Sitting, Left Arm      Pulse Oximetry 98%, RM AIR            Pain Score      Experiencing any pain?:  Yes      Pain Scale Used:  Numerical      Pain Intensity:  0            Fatigue Score      Experiencing any fatigue?:  Yes      Fatigue (0-10 scale):  7            EXAM      General Appearance:  Positive for: Alert, Oriented x3, Cooperative      Eye:  Positive for: Moist Conjunctiva, PERRLA, Reactive to Light      HEENT:  Positive for: Oropharynx clear      Neck:  Positive for: Full ROM      Respiratory:  Positive for: CTAB, Normal Respiratory Effort      Abdomen/Gastro:  Positive for: Normal Active Bowel Sounds, Soft;          Negative for: Distention, Tenderness      Cardiovascular:  Positive for: RRR;          Negative for: Murmur, Peripheral Edema      Skin:  Positive for: Normal Temperature, Normal Texture and Turgor, Normal Tone      Psychiatric:  Positive for: AAO X 3, Appropriate Affect, Intact Judgement      Neurologic:  Positive for: Cranial Ner II-XII Intact, Deep Tendon Reflexes;          Negative for: Dizziness, Headache      Genitourinary:  Negative for: Bladder Distention      Musculoskeletal:  Positive for: Full ROM Lower Extremety, Full ROM Upper     Extremety, Full Muscle Strength, Full Muscle Tone      Lower Extremities:  Positive for: Normal Gait and Station, Pedal Pulses Intact,     Pedal Pulses Symetrical      Lymphatic:  Negative for: Axillary, Cervical, Supraclavicular            PREVENTION      Hx Influenza Vaccination:  Yes (2017)      Date  Influenza Vaccine Given:  Oct 1, 2017      Influenza Vaccine Declined:  No      2 or More Falls Past Year?:  No      Fall Past Year with Injury?:  No      Hx Pneumococcal Vaccination:  No      Encouraged to follow-up with:  PCP regarding preventative exams.      Chart initiated by      MASSIEL CORDON [facility]            ALLERGY/MEDS      Allergies      Coded Allergies:             NO KNOWN DRUG ALLERGIES (Verified  Allergy, Unknown, 1/17/19)            Medications      Last Reconciled on 2/5/19 12:43 by ANTONINA PONCE      oxyCODONE/Acetaminophen 7.5/325 Mg (oxyCODONE/Acetaminophen 7.5/325 Mg) 1 Each     Tablet      1 TAB PO Q4H PRN for PAIN, TAB 0 Refills         Reported         1/17/19       Phentermine Hcl (Adipex-P) 37.5 Mg Cap      0.5 TAB PO QDAY, #30 CAP         Reported         1/17/19       Vortioxetine Hydrobromide (TRINTELLIX) 20 Mg Tablet      20 MG PO HS, #30 TAB 0 Refills         Reported         1/17/19      Medications Reviewed:  No Changes made to meds            IMPRESSION/PLAN      Impression            Hemochromatosis - E83.119      -2 copies of C82Y      -Previously received monthly phlebotomy. Ferritin improved only slightly. Last     ferritin was higher than previous.       -Target ferritin less than 100      -Ferritin 11/2018: 458, 12/2018 370, 1/2019: 379.       -Increase phlebotomy every week until ferritin less than 100.       -Recheck CBC, ferritin in 6 weeks.             Breast cancer - C50.919      -Stage IIA.  T2 N0 M0 G3.  Triple negative  right breast cancer in November 2015.  BRACA 1 and 2 negative. Treated at [facility] in [place] in     2015.      Status post bilateral mastectomy with sentinel LN biopsy. Currently in remission      -Invasive right breast cancer. ER negative [facility] negative HER-2/pablo negative.      -PET scan negative in September 2018.       -Has follow up visit for April 2019.            Diagnosis      Notes      New Diagnostics      * CMP Comp  Metabolic Panel, Routine         Dx: Hemochromatosis - E83.119      * Iron Profile, Routine         Dx: Hemochromatosis - E83.119      * Ferritin Level, Routine         Dx: Hemochromatosis - E83.119      * CBC, Routine         Dx: Hemochromatosis - E83.119      * CBC, 6 WEEKS         Dx: Hemochromatosis - E83.119      * Ferritin Level, 6 WEEKS         Dx: Hemochromatosis - E83.119            Plan      Weekly phlebotomy until ferritin less than 100. Ferritin weekly.       CBC and ferritin in 6 weeks after last weekly phlebotomy.       Return in April 2019 for follow up hemochromatosis and breast cancer with NP     visit.       Call with any questions or concerns.            Patient Education      Patient Education Provided:  Yes            Topics Patient Counseled on      food high in iron and Vitamin C      Avoid iron skillets.                 Disclaimer: Converted document may not contain table formatting or lab diagrams. Please see [employer] [facility] for the authenticated document.

## 2021-05-28 NOTE — PROGRESS NOTES
Patient: KINJAL BAGLEY     Acct: [id number]     Report: #[id number]  UNIT #: [id number]     : 1975    Encounter Date:2018  PRIMARY CARE: JAMSHID BUTCHER  ***Signed***  --------------------------------------------------------------------------------------------------------------------  NURSE INTAKE      Visit Type      Established Patient Visit            Chief Complaint      BREAST CANCER            Referring Provider/Copies To      Referring Provider:  JAMSHID BUTCHER            History and Present Illness      Past Oncology Illness History      This is a very pleasant 43-year-old female who presents for breast cancer.      T2N0M0 G3.  Treatment recommendations by Dr. Herrera were to continue annual     PET/CT.  Also follow-up tumor markers.            -.  Patient is status post right breast biopsy at 4:00 with     pathology demonstrating a grade 3 invasive ductal carcinoma triple negative.  ER    negative, SC negative, HER-2/pablo negative      -.  PET/CT showed a mass in the right breast with increased     metabolic activity with an SUV of 15.  Negative metabolically active axillary     lymph nodes.      - through .  Patient was treated with neoadjuvant     dose dense Adriamycin and Cytoxan followed by dose dense Taxol.      -2016.  Post neoadjuvant therapy PET/CT scan and MRI of the breast with     near complete response.      -.  Patient underwent bilateral skin sparing mastectomy by Dr. Trujillo with pathology revealing no residual malignancy in the right breast.      Left breast and left sentinel lymph nodes were also negative.  Patient deferred     adjuvant radiation therapy.      -[id number] 1-2016.  Received Xeloda 1500 mg by mouth twice a day 2     weeks on and one-week off for 6 cycles based on Create X clinical trial.      Treatment was complicated by worsening diarrhea and rash.  Therefore Xeloda      decreased at thousand milligrams by mouth twice a day.      -September .  PET/CT scan showed no cancer.  WBC 5.44.  Hemoglobin 14.9.     Platelet count 215,000.  Iron 208.  TIBC 240.  Ferritin 451            HPI - Oncology Interim      patient had bleeding in the colon in June. Was noted to be at area of polyp and     caused bleeding. Was stapled. Colonoscpy in 3 years.            PAST, FAMILY   Social History      Lives independently:  No      Occupation:  [employer]            Tobacco Use      Tobacco status:  Never smoker            Alcohol Use      Alcohol intake:  occassionally            Substance Use      Substance use:  Denies use            REVIEW OF SYSTEMS      General:  Admits: Fatigue;          Denies: Appetite Change, Fever, Night Sweats, Weight Gain, Weight Loss      Eye:  Denies: Blurred Vision, Corrective Lenses, Diplopia, Eye Irritation, Eye     Pain, Eye Redness, Spots in Vision, Vision Loss      ENT:  Denies: Headache, Hearing Loss, Hoarseness, Seizures, Sinus Congestion,     Sore Throat      Cardiovascular:  Denies: Chest Pain, Edema Ankles, Edema Legs, Irregular     Heartbeat, Palpitations      Respiratory:  Denies: Coughing Blood, Productive Cough, Shortness of Air,     Wheezing      Gastrointestinal:  Denies: Bloody Stools, Constipation, Diarrhea, Frequent     Heartburn, Nausea, Problem Swallowing, Tarry Stools, Unable to Control Bowels,     Vomiting      Genitourinary (female):  Denies: Blood in Urine, Decrease Urine Stream, Frequent    Urination, Incontinence, Painful Urination      Musculoskeletal:  Admits: Leg Cramps, Painful Joints      Integumentary:  Denies: Bleeds Easily, Bruises Easily, Hair Changes, Jaundice,     Lesions, Mole Changes, Nail Changes, Pigment Changes, Rash, Skin Discoloration      Neurologic:  Denies: Dizziness, Fainting, Numbness\Tingling, Paralysis, Seizures      Psychiatric:  Admits: Anxiety;          Denies: Confused, Depression, Disoriented, Memory  Loss            VITAL SIGNS AND SCORES      Vitals      Height 5 ft 2.00 in / 157.48 cm      Weight 157 lbs 3.008 oz / 71.3 kg      BSA 1.73 m2      BMI 28.8 kg/m2      Temperature 97.8 F / 36.56 C - Temporal      Pulse 77      Blood Pressure 125/54 Sitting, Left Arm      Pulse Oximetry 100%, RM AIR            Pain Score      Experiencing any pain?:  No      Pain Scale Used:  Numerical      Pain Intensity:  0            Fatigue Score      Experiencing any fatigue?:  Yes      Fatigue (0-10 scale):  7            EXAM      General Appearance:  Positive for: Alert, Oriented x3, Cooperative      Eye:  Positive for: Anicteric Sclerae, Moist Conjunctiva, PERRLA      HEENT:  Positive for: Oropharynx clear;          Negative for: Erythema      Neck:  Positive for: Full ROM;          Negative for: JVD, Masses      Respiratory:  Positive for: CTAB, Normal Respiratory Effort      Abdomen/Gastro:  Positive for: Normal Active Bowel Sounds;          Negative for: Distention, Fluid Wave      Cardiovascular:  Positive for: Normal PMI, RRR      Skin:  Positive for: Normal Temperature, Normal Texture and Turgor, Normal Tone      Psychiatric:  Positive for: AAO X 3, Appropriate Affect      Neurologic:  Positive for: Cranial Ner II-XII Intact, Deep Tendon Reflexes      Musculoskeletal:  Positive for: Full ROM Lower Extremety, Full ROM Upper     Extremety, Full Muscle Strength            PREVENTION      Hx Influenza Vaccination:  Yes (2017)      Date Influenza Vaccine Given:  Oct 1, 2017      Influenza Vaccine Declined:  No      2 or More Falls Past Year?:  No      Fall Past Year with Injury?:  No      Hx Pneumococcal Vaccination:  No      Encouraged to follow-up with:  PCP regarding preventative exams.      Chart initiated by      MASSIEL CORDON CMA            ALLERGY/MEDS      Allergies      Coded Allergies:             NO KNOWN DRUG ALLERGIES (Verified  Allergy, Unknown, 6/9/18)            Medications      Last Reconciled on 11/3/18  15:02 by LINDA DIAZ MD      (Krystalkendi)   No Conflict Check      50 MG QDAY         Reported         11/1/18       Turmeric Root Extract (Turmeric) 1 Each Capsule      500 MG PO QDAY, CAP         Reported         3/29/18       Vortioxetine Hydrobromide (TRINTELLIX) 10 Mg Tablet      10 MG PO HS, #30 TAB 0 Refills         Reported         3/29/18            IMPRESSION/PLAN      Diagnosis      Breast cancer - C50.919      -Stage IIA.  T2 N0 M0 G3.  Triple negative breast cancer. Currently in remission      -Invasive right breast cancer. ER negative [place] negative HER-2/pablo negative      -Patient has no signs and symptoms of breast cancer recurrence      -Counseled patient to call us if she has any new symptoms.            Cancer related pain - G89.3      -Counseled patient on over the counter stool softeners.       -Patient voiced understanding and agreed to take over the counter Miralax and     stool softners to prevent constipation.      -Continue current pain regimen.             Hemochromatosis - E83.119      -Therapeutic phlebotomy every month for 3 months      -Target ferritin less than 100            Neoplastic malignant related fatigue - R53.0      -Due to underlying cancer.       -Recommended increased physical activity including exercise      -Counseled patient to improve nutrition including increased protein intake.        -Discussed patient to increase fluid hydration to at least 8 cups of fluid to     decrease risk of dehydration.             Notes      -Today's visit is an encounter for oncology evaluation and treatment.       -Patient's radiology exams, blood tests, physicians' notes, and medications were     reviewed today to assess patient's medical treatment plan. ECOG 1.       -Patient was advised to call us right away if there are any new symptoms after     today's visit for an urgent evaluation since this may be due to cancer     worsening. Patient voiced understanding and agreed to do so.       -Radiology imaging tests from September 2018 to today were reviewed     independently by me by direct visualization of the images.        -Old medical records were reviewed and summarized in chronological order in the     HPI today to maintain an updated medical record.      New Medications      * (TROKENDI): 50 MG QDAY      Discontinued Medications      * FERROUS SULFATE (Ferrous Sulfate*) 325 MG TABLET: 325 MG PO BID #60            Plan      -Return to clinic in 3 months.       -Today's Plan.  Orders placed for phlebotomy every month for 3 months for     hemochromatosis            Patient Education      Patient Education Provided:  Yes                 Disclaimer: Converted document may not contain table formatting or lab diagrams. Please see [employer] for the authenticated document.

## 2021-05-28 NOTE — PROGRESS NOTES
Patient: KINJAL BAGLEY     Acct: [id number]     Report: #[id number]  UNIT #: [id number]     : 1975    Encounter Date:2019  PRIMARY CARE: JAMSHID BUTCHER  ***Signed***  --------------------------------------------------------------------------------------------------------------------  NURSE INTAKE      Visit Type      Established Patient Visit            Chief Complaint      BREAST CANCER      Hereditary Hemochromatosis            Referring Provider/Copies To      Referring Provider:  JAMSHID BUTCHER      Primary Care Provider:  JAMSHID BUTCHER            History and Present Illness      Past Oncology Illness History      This is a very pleasant 43-year-old female who presents for breast cancer.      T2N0M0 G3.  Treatment recommendations by Dr. Herrera were to continue annual     PET/CT.  Also follow-up tumor markers.            -.  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  ER     negative, MS negative, HER-2/pablo negative      -.  PET/CT showed a mass in the right breast with increased     metabolic activity with an SUV of 15.  Negative metabolically active axillary     lymph nodes.      - through .  Patient was treated with neoadjuvant     dose dense Adriamycin and Cytoxan followed by dose dense Taxol.      -2016.  Post neoadjuvant therapy PET/CT scan and MRI of the breast with     near complete response.      -.  Patient underwent bilateral skin sparing mastectomy by Dr. Trujillo with pathology revealing no residual malignancy in the right breast.      Left breast and left sentinel lymph nodes were also negative.  Patient deferred     adjuvant radiation therapy.      -[id number] 1-2016.  Received Xeloda 1500 mg by mouth twice a day 2     weeks on and one-week off for 6 cycles based on [employer] X clinical trial.       Treatment was complicated by worsening diarrhea and rash.  Therefore Xeloda     decreased at thousand milligrams by mouth twice a day.      -July 31, 2018. Elevated ferritin. Diagnosed with hemochromatosis. 2 copies of     C82Y.       -September .  PET/CT scan showed no cancer.  WBC 5.44.  Hemoglobin 14.9.     Platelet count 215,000.  Iron 208.  TIBC 240.  Ferritin 451      -November 9, 2018. Ferritin 542 phlebotomy       -December 14, 2018. Ferritin 347, phlebotomy      -January 11, 2019. Ferritin 379 phlebotomy             Patient is here for follow-up.  No new complaints.  She is seeing Dr. Herrera every    6 months.  She initially underwent weekly phlebotomies however unable to do it     and currently she is on every 2-3 weeks.  She denies any headache, nausea,     vomiting or other symptoms.  Her PET scan in 2018 was essentially negative.      Patient had a clavicular fracture right side and underwent surgery for the same.      Her ferritin was 208 on 4/8/2019.            Treatments      Chemotherapy      AC x 4 cycles, followed by Paclitaxel x 12 cycles in 2015 in [place] [facility].       Followed by Xeloda 1500 mg x 6 cycles on June 1/2016.            Clinical Trial Participant      No            ECOG Performance Status      0            Most Recent Lab Findings      Laboratory Tests      4/8/19 13:26            Laboratory Tests            Test       4/8/19      13:26             Ferritin       208 ng/mL      ()            Most Recent Imaging Findings            IMPRESSION:      No MRI signs of intracapsular or extracapsular implant rupture.             MRI was performed without contrast to evaluate for implant integrity and not     performed to detect       breast cancer.                             PLEASE NOTE:  A NORMAL MRI DOES NOT EXCLUDE THE POSSIBILITY OF BREAST CANCER.  A    CLINICALLY       SUSPICIOUS PALPABLE LUMP SHOULD BE BIOPSIED.                 OPHELIA HERNANDEZ MD             Electronically Signed and Approved By: OPHELIA HERNANDEZ MD on 2/19/2019 at 16:23            PAST, FAMILY   Past Medical History      Hematology/Oncology (F):  Breast Cancer            Past Surgical History      Fracture Repair, Mastectomy Bilateral            Social History      Marital Status:        Lives independently:  No      Occupation:  [employer]            Tobacco Use      Tobacco status:  Never smoker            Alcohol Use      Alcohol intake:  occassionally            Substance Use      Substance use:  Denies use            REVIEW OF SYSTEMS      General:  Admits: Fatigue;          Denies: Appetite Change, Fever, Night Sweats, Weight Gain, Weight Loss      Eye:  Denies: Blurred Vision, Corrective Lenses, Diplopia, Eye Irritation, Eye     Pain, Eye Redness, Spots in Vision, Vision Loss      ENT:  Denies: Headache, Hearing Loss, Hoarseness, Seizures, Sinus Congestion,     Sore Throat      Cardiovascular:  Denies: Chest Pain, Edema Ankles, Edema Legs, Irregular     Heartbeat, Palpitations      Respiratory:  Denies: Coughing Blood, Productive Cough, Shortness of Air,     Wheezing      Gastrointestinal:  Denies: Bloody Stools, Constipation, Diarrhea, Frequent     Heartburn, Nausea, Problem Swallowing, Tarry Stools, Unable to Control Bowels,     Vomiting      Genitourinary (female):  Denies: Blood in Urine, Decrease Urine Stream, Frequent    Urination, Incontinence, Painful Urination      Musculoskeletal:  Denies: Back Pain, Leg Cramps, Muscle Pain, Muscle Weakness,     Painful Joints, Swollen Joints      Integumentary:  Denies: Bleeds Easily, Bruises Easily, Hair Changes, Jaundice,     Lesions, Mole Changes, Nail Changes, Pigment Changes, Rash, Skin Discoloration      Neurologic:  Denies: Dizziness, Fainting, Numbness\Tingling, Paralysis, Seizures      Psychiatric:  Denies: Anxiety, Confused, Depression, Disoriented, Memory Loss      Endocrine:  Denies: Cold  Intolerance, Diabetes, Excessive Sweating, Excessive     Thirst, Excessive Urination, Heat Intolerance, Flushing, Hyperthyroidism,     Hypothyroidism      Hematologic/Lymphatic:  Denies: Bruising, Bleeding, Enlarged Lymph Nodes,     Recurrent Infections, Transfusions      Reproductive:  Denies: Menopause, Heavy Periods, Pregnant, Still Menstruating            VITAL SIGNS AND SCORES      Vitals      Height 5 ft 2.00 in / 157.48 cm      Weight 164 lbs 0.356 oz / 74.4 kg      BSA 1.76 m2      BMI 30.0 kg/m2      Temperature 98.0 F / 36.67 C - Temporal      Pulse 71      Respirations 18      Blood Pressure 156/89 Sitting, Right Arm      Pulse Oximetry 96%, RM AIR            Pain Score      Experiencing any pain?:  No      Pain Scale Used:  Numerical      Pain Intensity:  0            Fatigue Score      Experiencing any fatigue?:  Yes      Fatigue (0-10 scale):  2            EXAM      General Appearance:  Positive for: Alert, Cooperative      Eye:  Positive for: Anicteric Sclerae, PERRLA      HEENT:  Positive for: Oropharynx clear      Neck:  Positive for: Full ROM, Supple;          Negative for: Masses      Respiratory:  Positive for: CTAB, Normal Respiratory Effort      Breast - Left:  Negative for: Adenopathy, Appearance, Discharge, Mass, Skin     Changes      Breast - Right:  Negative for: Adenopathy, Appearance, Discharge, Mass, Skin     Changes      Other      Patient has bilateral mastectomies with implant no axillary adenopathy.  No     evidence of any suspicious masses.      Abdomen/Gastro:  Positive for: Normal Active Bowel Sounds, Soft;          Negative for: Hepatosplenomegaly, Rebound      Cardiovascular:  Positive for: RRR      Skin:  Positive for: Normal Texture and Turgor      Psychiatric:  Positive for: AAO X 3, Appropriate Affect      Neurologic:  Negative for: Focal Sensory Deficit, Headache      Musculoskeletal:  Positive for: Full ROM Lower Extremety, Full ROM Upper     Extremety      Lower  Extremities:  Negative for: Clubbing, Edema      Upper Extremities:  Negative for: Clubbing, Edema      Lymphatic:  Negative for: Axillary, Cervical, Epitrochlear, Femoral,     Infraclavicular, Inguinal, Occipital, Popliteal, Posterior auricular,     Preauricular, Supraclavicular            PREVENTION      Influenza Vaccine Declined:  No      2 or More Falls Past Year?:  No      Fall Past Year with Injury?:  No      Encouraged to follow-up with:  PCP regarding preventative exams.      Chart initiated by      MASSIEL CORDON [facility]            ALLERGY/MEDS      Allergies      Coded Allergies:             NO KNOWN DRUG ALLERGIES (Verified  Allergy, Unknown, 4/9/19)            Medications      Last Reconciled on 2/5/19 12:43 by ANTONINA PONCE      oxyCODONE/Acetaminophen 7.5/325 Mg (oxyCODONE/Acetaminophen 7.5/325 Mg) 1 Each     Tablet      1 TAB PO Q4H PRN for PAIN, TAB 0 Refills         Reported         1/17/19       Vortioxetine Hydrobromide (TRINTELLIX) 20 Mg Tablet      20 MG PO HS, #30 TAB 0 Refills         Reported         1/17/19      Medications Reviewed:  Changes made to meds            IMPRESSION/PLAN      Impression      1.  Triple negative breast cancer      2.  Hereditary hemochromatosis            Diagnosis      Hemochromatosis         Hereditary hemochromatosis - E83.110         Hemochromatosis type: hereditary            Breast cancer - C50.919         Estrogen receptor status: negative         Patient sex: female         Laterality: right            Hemochromatosis - E83.119            Notes      New Diagnostics      * CBC With Auto Diff, 3 Months         Dx: Hemochromatosis - E83.119      * CMP Comp Metabolic Panel, 3 Months         Dx: Hemochromatosis - E83.119      * Iron Profile, 3 Months         Dx: Hemochromatosis - E83.119      * Ferritin Level, 3 Months         Dx: Hemochromatosis - E83.119            Plan      Patient with stage II a grade 3 triple negative breast cancer #2015.  Her BRCA 1     and 2 were negative.  At this time she is on monitoring.  I explained to the     patient that most of the triple negative breast cancer if they recur tend to do     that in the first 3-4 years rather than later.  Close monitoring will be     essential initially.  Patient is seeing Dr. Colmenares every 6 months in [place].  I     explained to the patient once again that hopefully we should be able to decrease    and avoid that yearly CT PET scan.  Due for MRI in September.            Patient is currently undergoing phlebotomies once in 2-3 weeks.  Her ferritin is    slowly coming down to 208.  I explained to the patient that she can have once in    2 weeks phlebotomy and maintain the ferritin to less than 100.      Given the fact that she is undergoing CT PET scan I would avoid doing an     ultrasound of the abdomen at this time.  I also explained that she should     probably see a hepatologist given the hemochromatosis and she could have other     scans for cirrhosis etc.            She will be seen in the office in 3 months with a CBC iron profile ferritin and     a CMP.            Thanks for allowing us to participate in taking care of this patient.            Patient Education      Patient Education Provided:  Yes                 Disclaimer: Converted document may not contain table formatting or lab diagrams. Please see [employer] [facility] for the authenticated document.

## 2021-06-28 ENCOUNTER — OFFICE VISIT (OUTPATIENT)
Dept: INTERNAL MEDICINE | Facility: CLINIC | Age: 46
End: 2021-06-28

## 2021-06-28 VITALS
WEIGHT: 163 LBS | TEMPERATURE: 97.6 F | BODY MASS INDEX: 30.78 KG/M2 | HEART RATE: 83 BPM | SYSTOLIC BLOOD PRESSURE: 132 MMHG | DIASTOLIC BLOOD PRESSURE: 92 MMHG | OXYGEN SATURATION: 99 % | HEIGHT: 61 IN

## 2021-06-28 DIAGNOSIS — C50.211 MALIGNANT NEOPLASM OF UPPER-INNER QUADRANT OF RIGHT BREAST IN FEMALE, ESTROGEN RECEPTOR NEGATIVE (HCC): ICD-10-CM

## 2021-06-28 DIAGNOSIS — Z17.1 MALIGNANT NEOPLASM OF UPPER-INNER QUADRANT OF RIGHT BREAST IN FEMALE, ESTROGEN RECEPTOR NEGATIVE (HCC): ICD-10-CM

## 2021-06-28 DIAGNOSIS — R53.82 CHRONIC FATIGUE: Primary | ICD-10-CM

## 2021-06-28 DIAGNOSIS — M54.6 ACUTE BILATERAL THORACIC BACK PAIN: ICD-10-CM

## 2021-06-28 LAB
ALBUMIN SERPL-MCNC: 4.2 G/DL (ref 3.5–5.2)
ALBUMIN/GLOB SERPL: 2.2 G/DL
ALP SERPL-CCNC: 65 U/L (ref 39–117)
ALT SERPL W P-5'-P-CCNC: 15 U/L (ref 1–33)
ANION GAP SERPL CALCULATED.3IONS-SCNC: 9.1 MMOL/L (ref 5–15)
AST SERPL-CCNC: 14 U/L (ref 1–32)
BASOPHILS # BLD AUTO: 0.09 10*3/MM3 (ref 0–0.2)
BASOPHILS NFR BLD AUTO: 1.4 % (ref 0–1.5)
BILIRUB SERPL-MCNC: 0.3 MG/DL (ref 0–1.2)
BUN SERPL-MCNC: 12 MG/DL (ref 6–20)
BUN/CREAT SERPL: 18.5 (ref 7–25)
CALCIUM SPEC-SCNC: 8.9 MG/DL (ref 8.6–10.5)
CHLORIDE SERPL-SCNC: 105 MMOL/L (ref 98–107)
CO2 SERPL-SCNC: 26.9 MMOL/L (ref 22–29)
CREAT SERPL-MCNC: 0.65 MG/DL (ref 0.57–1)
DEPRECATED RDW RBC AUTO: 41.2 FL (ref 37–54)
EOSINOPHIL # BLD AUTO: 0.17 10*3/MM3 (ref 0–0.4)
EOSINOPHIL NFR BLD AUTO: 2.6 % (ref 0.3–6.2)
ERYTHROCYTE [DISTWIDTH] IN BLOOD BY AUTOMATED COUNT: 11.6 % (ref 12.3–15.4)
FERRITIN SERPL-MCNC: 511 NG/ML (ref 13–150)
FOLATE SERPL-MCNC: 6.38 NG/ML (ref 4.78–24.2)
GFR SERPL CREATININE-BSD FRML MDRD: 99 ML/MIN/1.73
GLOBULIN UR ELPH-MCNC: 1.9 GM/DL
GLUCOSE SERPL-MCNC: 94 MG/DL (ref 65–99)
HCT VFR BLD AUTO: 39.6 % (ref 34–46.6)
HGB BLD-MCNC: 13.4 G/DL (ref 12–15.9)
IMM GRANULOCYTES # BLD AUTO: 0.03 10*3/MM3 (ref 0–0.05)
IMM GRANULOCYTES NFR BLD AUTO: 0.5 % (ref 0–0.5)
IRON 24H UR-MRATE: 216 MCG/DL (ref 37–145)
IRON SATN MFR SERPL: 74 % (ref 20–50)
LYMPHOCYTES # BLD AUTO: 1.66 10*3/MM3 (ref 0.7–3.1)
LYMPHOCYTES NFR BLD AUTO: 25 % (ref 19.6–45.3)
MCH RBC QN AUTO: 32.8 PG (ref 26.6–33)
MCHC RBC AUTO-ENTMCNC: 33.8 G/DL (ref 31.5–35.7)
MCV RBC AUTO: 96.8 FL (ref 79–97)
MONOCYTES # BLD AUTO: 0.51 10*3/MM3 (ref 0.1–0.9)
MONOCYTES NFR BLD AUTO: 7.7 % (ref 5–12)
NEUTROPHILS NFR BLD AUTO: 4.17 10*3/MM3 (ref 1.7–7)
NEUTROPHILS NFR BLD AUTO: 62.8 % (ref 42.7–76)
NRBC BLD AUTO-RTO: 0 /100 WBC (ref 0–0.2)
PLATELET # BLD AUTO: 247 10*3/MM3 (ref 140–450)
PMV BLD AUTO: 11.4 FL (ref 6–12)
POTASSIUM SERPL-SCNC: 4.1 MMOL/L (ref 3.5–5.2)
PROT SERPL-MCNC: 6.1 G/DL (ref 6–8.5)
RBC # BLD AUTO: 4.09 10*6/MM3 (ref 3.77–5.28)
SODIUM SERPL-SCNC: 141 MMOL/L (ref 136–145)
T4 FREE SERPL-MCNC: 1 NG/DL (ref 0.93–1.7)
TIBC SERPL-MCNC: 291 MCG/DL (ref 298–536)
TRANSFERRIN SERPL-MCNC: 195 MG/DL (ref 200–360)
TSH SERPL DL<=0.05 MIU/L-ACNC: 1 UIU/ML (ref 0.27–4.2)
VIT B12 BLD-MCNC: 422 PG/ML (ref 211–946)
WBC # BLD AUTO: 6.63 10*3/MM3 (ref 3.4–10.8)

## 2021-06-28 PROCEDURE — 83540 ASSAY OF IRON: CPT | Performed by: INTERNAL MEDICINE

## 2021-06-28 PROCEDURE — 84439 ASSAY OF FREE THYROXINE: CPT | Performed by: INTERNAL MEDICINE

## 2021-06-28 PROCEDURE — 99214 OFFICE O/P EST MOD 30 MIN: CPT | Performed by: INTERNAL MEDICINE

## 2021-06-28 PROCEDURE — 84443 ASSAY THYROID STIM HORMONE: CPT | Performed by: INTERNAL MEDICINE

## 2021-06-28 PROCEDURE — 86664 EPSTEIN-BARR NUCLEAR ANTIGEN: CPT | Performed by: INTERNAL MEDICINE

## 2021-06-28 PROCEDURE — 82728 ASSAY OF FERRITIN: CPT | Performed by: INTERNAL MEDICINE

## 2021-06-28 PROCEDURE — 86644 CMV ANTIBODY: CPT | Performed by: INTERNAL MEDICINE

## 2021-06-28 PROCEDURE — 85025 COMPLETE CBC W/AUTO DIFF WBC: CPT | Performed by: INTERNAL MEDICINE

## 2021-06-28 PROCEDURE — 82746 ASSAY OF FOLIC ACID SERUM: CPT | Performed by: INTERNAL MEDICINE

## 2021-06-28 PROCEDURE — 82607 VITAMIN B-12: CPT | Performed by: INTERNAL MEDICINE

## 2021-06-28 PROCEDURE — 86665 EPSTEIN-BARR CAPSID VCA: CPT | Performed by: INTERNAL MEDICINE

## 2021-06-28 PROCEDURE — 86645 CMV ANTIBODY IGM: CPT | Performed by: INTERNAL MEDICINE

## 2021-06-28 PROCEDURE — 80053 COMPREHEN METABOLIC PANEL: CPT | Performed by: INTERNAL MEDICINE

## 2021-06-28 PROCEDURE — 84466 ASSAY OF TRANSFERRIN: CPT | Performed by: INTERNAL MEDICINE

## 2021-06-28 RX ORDER — LISINOPRIL 20 MG/1
20 TABLET ORAL DAILY
COMMUNITY
Start: 2021-04-16 | End: 2022-04-01

## 2021-06-28 RX ORDER — CELECOXIB 200 MG/1
200 CAPSULE ORAL DAILY
Qty: 90 CAPSULE | Refills: 0 | Status: SHIPPED | OUTPATIENT
Start: 2021-06-28 | End: 2021-10-20

## 2021-06-28 RX ORDER — VORTIOXETINE 10 MG/1
TABLET, FILM COATED ORAL
COMMUNITY
Start: 2021-06-11 | End: 2021-10-20

## 2021-06-28 RX ORDER — CELECOXIB 200 MG/1
200 CAPSULE ORAL DAILY
COMMUNITY
Start: 2021-06-11 | End: 2021-06-28 | Stop reason: SDUPTHER

## 2021-06-28 RX ORDER — ESTRADIOL AND NORETHINDRONE ACETATE 1; .5 MG/1; MG/1
1 TABLET ORAL DAILY
COMMUNITY
End: 2022-04-01

## 2021-06-28 RX ORDER — BUSPIRONE HYDROCHLORIDE 10 MG/1
10 TABLET ORAL 2 TIMES DAILY
COMMUNITY
End: 2021-06-28 | Stop reason: SDUPTHER

## 2021-06-28 NOTE — PROGRESS NOTES
"Chief Complaint  Anxiety    Subjective          Keerthi Pillai presents to [facility]  History of Present Illness    Diarrhea from last time was a stomach she is sure of this because her daughter got it  Currently totally good    Back has started bothering her again  It is the left and right  Aching in general, can't get comfortable  When she lies down she is ok, but sitting and moving bothers it  Tried NSAIDS, muscle relaxers and no improvement    In February she was diagnosed with kidney stone    Fatigue is significant  Is like she is going to fall asleep at work  Able to fall asleep on the couch quickly    Stress is ok, has been on 10mg of trintellix and doing ok with that    HTN-  Right now bp is typically 115/70's unless she is in pain    Still likes her hormone pills      Objective   Vital Signs:   /92   Pulse 83   Temp 97.6 °F (36.4 °C)   Ht 154.9 cm (61\")   Wt 73.9 kg (163 lb)   SpO2 99%   BMI 30.80 kg/m²     Physical Exam  Vitals reviewed.   Constitutional:       Appearance: Normal appearance. She is well-developed.   HENT:      Head: Normocephalic and atraumatic.      Right Ear: External ear normal.      Left Ear: External ear normal.      Mouth/Throat:      Pharynx: No oropharyngeal exudate.   Eyes:      Conjunctiva/sclera: Conjunctivae normal.      Pupils: Pupils are equal, round, and reactive to light.   Cardiovascular:      Rate and Rhythm: Normal rate and regular rhythm.      Heart sounds: No murmur heard.   No friction rub. No gallop.    Pulmonary:      Effort: Pulmonary effort is normal.      Breath sounds: Normal breath sounds. No wheezing or rhonchi.   Skin:     General: Skin is warm and dry.   Neurological:      Mental Status: She is alert and oriented to person, place, and time.      Cranial Nerves: No cranial nerve deficit.   Psychiatric:         Mood and Affect: Affect normal.         Behavior: Behavior normal.         Thought " Content: Thought content normal.        Result Review :     Common labs    Common Labsle 10/21/20 10/21/20 3/9/21 3/9/21 4/16/21 4/16/21    1516 1516 1030 1030 1503 1503   Glucose  119 (A)  86  94   BUN  11  11  12   Creatinine  0.60 (A)  0.67  0.75   Sodium  139  139  137   Potassium  4.0  4.1  3.8   Chloride  106  103  106   Calcium  9.0  9.0  8.6 (A)   Albumin  3.6  4.0  3.9   Total Bilirubin  0.5  0.27  0.36   Alkaline Phosphatase  69  83  60   AST (SGOT)  25  19  19   ALT (SGPT)  19  15  15   WBC 6.74  6.26  5.67    Hemoglobin 13.1  13.5  12.4    Hematocrit 37.2  39.9  35.7 (A)    Platelets 238  248  238    (A) Abnormal value       Comments are available for some flowsheets but are not being displayed.              Procedures      Assessment and Plan    Diagnoses and all orders for this visit:    1. Chronic fatigue (Primary)  Comments:  Will check labs and adjust meds as needed based on results  Orders:  -     XR Spine Thoracic 2 View (In Office)  -     CBC & Differential  -     Comprehensive Metabolic Panel  -     TSH  -     T4, Free  -     EBV Antibody Profile  -     CMV IgG IgM  -     Vitamin B12  -     Folate  -     Iron Profile  -     Ferritin    2. Malignant neoplasm of upper-inner quadrant of right breast in female, estrogen receptor negative (CMS/HCC)  -     XR Spine Thoracic 2 View (In Office)  -     CBC & Differential  -     Comprehensive Metabolic Panel  -     TSH  -     T4, Free  -     EBV Antibody Profile  -     CMV IgG IgM  -     Vitamin B12  -     Folate  -     Iron Profile  -     Ferritin    3. Acute bilateral thoracic back pain  Comments:  Back x-ray today  Orders:  -     XR Spine Thoracic 2 View (In Office)    Other orders  -     celecoxib (CeleBREX) 200 MG capsule; Take 1 capsule by mouth Daily.  Dispense: 90 capsule; Refill: 0        Follow Up   Return in about 4 weeks (around 7/26/2021).  Patient was given instructions and counseling regarding her condition or for health maintenance advice.  Please see specific information pulled into the AVS if appropriate.

## 2021-06-30 LAB
CMV IGG SERPL IA-ACNC: <0.6 U/ML (ref 0–0.59)
CMV IGM SERPL IA-ACNC: <30 AU/ML (ref 0–29.9)
EBV NA IGG SER IA-ACNC: 363 U/ML (ref 0–17.9)
EBV VCA IGG SER IA-ACNC: 521 U/ML (ref 0–17.9)
EBV VCA IGM SER IA-ACNC: <36 U/ML (ref 0–35.9)
SERVICE CMNT-IMP: ABNORMAL

## 2021-07-14 ENCOUNTER — TELEPHONE (OUTPATIENT)
Dept: INTERNAL MEDICINE | Facility: CLINIC | Age: 46
End: 2021-07-14

## 2021-07-14 NOTE — TELEPHONE ENCOUNTER
Patient called and said her upper back was bothering her and we did an x-ray and it was clear and she said someone called and wanted to know if she wanted a CT ordered. Recent labs showed she had mono. Pain hasn't gone away and she went to go get more blood and she didn't stop bleeding very easily and she wanted to know if this could be  from an enlarged spleen from the Mono.  Do you feel the CT would be needed and if so she is ok with doing this. She has an appt Friday with Adeline for follow up is this ok or does she need to get CT first then follow up? Please advise.

## 2021-07-15 ENCOUNTER — TELEPHONE (OUTPATIENT)
Dept: INTERNAL MEDICINE | Facility: CLINIC | Age: 46
End: 2021-07-15

## 2021-07-15 NOTE — TELEPHONE ENCOUNTER
Patient called inquiring about the CT scan again. Discussed that a provider can at times palpate for a enlarged spleen. Patient has appointment with brandon tomorrow and voiced agreement to see what brandon recommends tomorrow.

## 2021-07-16 ENCOUNTER — OFFICE VISIT (OUTPATIENT)
Dept: INTERNAL MEDICINE | Facility: CLINIC | Age: 46
End: 2021-07-16

## 2021-07-16 ENCOUNTER — HOSPITAL ENCOUNTER (OUTPATIENT)
Dept: CT IMAGING | Facility: HOSPITAL | Age: 46
Discharge: HOME | End: 2021-07-16
Admitting: PHYSICIAN ASSISTANT

## 2021-07-16 VITALS
OXYGEN SATURATION: 99 % | DIASTOLIC BLOOD PRESSURE: 86 MMHG | HEART RATE: 64 BPM | TEMPERATURE: 97.4 F | HEIGHT: 61 IN | BODY MASS INDEX: 30.02 KG/M2 | RESPIRATION RATE: 15 BRPM | WEIGHT: 159 LBS | SYSTOLIC BLOOD PRESSURE: 140 MMHG

## 2021-07-16 DIAGNOSIS — R10.9 LEFT FLANK PAIN: ICD-10-CM

## 2021-07-16 DIAGNOSIS — M54.6 ACUTE THORACIC BACK PAIN, UNSPECIFIED BACK PAIN LATERALITY: ICD-10-CM

## 2021-07-16 DIAGNOSIS — M43.16 SPONDYLOLISTHESIS OF LUMBAR REGION: ICD-10-CM

## 2021-07-16 DIAGNOSIS — N20.0 NEPHROLITHIASIS: ICD-10-CM

## 2021-07-16 DIAGNOSIS — M51.36 DEGENERATIVE DISC DISEASE, LUMBAR: ICD-10-CM

## 2021-07-16 DIAGNOSIS — M54.6 ACUTE THORACIC BACK PAIN, UNSPECIFIED BACK PAIN LATERALITY: Primary | ICD-10-CM

## 2021-07-16 PROCEDURE — 74176 CT ABD & PELVIS W/O CONTRAST: CPT | Performed by: RADIOLOGY

## 2021-07-16 PROCEDURE — 74176 CT ABD & PELVIS W/O CONTRAST: CPT

## 2021-07-16 PROCEDURE — 96372 THER/PROPH/DIAG INJ SC/IM: CPT | Performed by: PHYSICIAN ASSISTANT

## 2021-07-16 PROCEDURE — 99213 OFFICE O/P EST LOW 20 MIN: CPT | Performed by: PHYSICIAN ASSISTANT

## 2021-07-16 RX ORDER — BACLOFEN 10 MG/1
5 TABLET ORAL DAILY PRN
Qty: 15 TABLET | Refills: 0 | Status: SHIPPED | OUTPATIENT
Start: 2021-07-16 | End: 2021-07-27

## 2021-07-16 RX ORDER — KETOROLAC TROMETHAMINE 30 MG/ML
60 INJECTION, SOLUTION INTRAMUSCULAR; INTRAVENOUS ONCE
Status: COMPLETED | OUTPATIENT
Start: 2021-07-16 | End: 2021-07-16

## 2021-07-16 RX ADMIN — KETOROLAC TROMETHAMINE 60 MG: 30 INJECTION, SOLUTION INTRAMUSCULAR; INTRAVENOUS at 11:24

## 2021-07-16 NOTE — PROGRESS NOTES
Chief Complaint  left mid back pain (x3 weeks.)    Subjective          Keerthi Pillai presents to [facility]  Pt c/o mid back pain since   Pain mostly in mid back but wrap around L side under her ribs  Denies injury, trauma, accidents  Denies increase in lifting/pushing/pulling  Pain constant but severity comes and goes. Pain 8/10 at worst.  Denies dysuria, frequency, hematuria.   She has tried ibu and heat without relief.   Denies saddle anesthesia or incontinence.   She was seen in office and had XR Showing bone spur .   She is concerned spleen may be enlarged due to mono on recent labs.  She has hx of kidney stones, last imaging was in 2021. She was told she had an 8mm stone at that time  She has hx of breast cancer      Past Medical History:   Diagnosis Date   • Acne    • Anxiety    • Arthritis    • Benign essential hypertension    • Breast cancer (CMS/HCC)    • Cancer (CMS/HCC)    • Depression    • Forgetfulness    • Hemochromatosis 2018   • Lumbago 2020    low back pain   • Migraine    • Moderate episode of recurrent major depressive disorder (CMS/HCC) 12/10/2017    will adjust lexapro and wellbutrin and see how she does   • Night sweats    • Postmenopausal 2020   • Primary osteoarthritis of right knee 2018   • Sinus trouble     unspecified   • Spondylolisthesis, lumbar region 2020        Past Surgical History:   Procedure Laterality Date   • BREAST RECONSTRUCTION      with implants   •  SECTION      ,    • COLONOSCOPY     • FAT GRAFTING Bilateral 2019    breasts    • KNEE MENISCAL REPAIR     • MASTECTOMY Bilateral    • OTHER SURGICAL HISTORY      joint surgery   • OTHER SURGICAL HISTORY      port placement        Current Outpatient Medications on File Prior to Visit   Medication Sig Dispense Refill   • celecoxib (CeleBREX) 200 MG capsule Take 1 capsule by mouth Daily. 90 capsule 0   •  "estradiol-norethindrone (ACTIVELLA) 1-0.5 MG per tablet Take 1 tablet by mouth Daily.     • lisinopril (PRINIVIL,ZESTRIL) 20 MG tablet Take 20 mg by mouth Daily.     • Trintellix 10 MG tablet TAKE ONE TABLET BY MOUTH EVERY DAY AT THE SAME TIME EACH DAY       No current facility-administered medications on file prior to visit.        No Known Allergies    Social History     Tobacco Use   Smoking Status Never Smoker   Smokeless Tobacco Never Used          Objective   Vital Signs:   /86   Pulse 64   Temp 97.4 °F (36.3 °C)   Resp 15   Ht 154.9 cm (61\")   Wt 72.1 kg (159 lb)   SpO2 99%   BMI 30.04 kg/m²     Physical Exam  Vitals reviewed.   Constitutional:       Appearance: Normal appearance.   HENT:      Head: Normocephalic and atraumatic.      Nose: Nose normal.      Mouth/Throat:      Mouth: Mucous membranes are moist.   Eyes:      Extraocular Movements: Extraocular movements intact.      Conjunctiva/sclera: Conjunctivae normal.      Pupils: Pupils are equal, round, and reactive to light.   Cardiovascular:      Rate and Rhythm: Normal rate and regular rhythm.   Pulmonary:      Effort: Pulmonary effort is normal.      Breath sounds: Normal breath sounds.   Abdominal:      General: Abdomen is flat. Bowel sounds are normal.      Palpations: Abdomen is soft.      Tenderness: There is no right CVA tenderness or left CVA tenderness.   Musculoskeletal:         General: Tenderness (T spine, L ribs) present.   Neurological:      General: No focal deficit present.      Mental Status: She is alert and oriented to person, place, and time.   Psychiatric:         Mood and Affect: Mood normal.        Result Review :   The following data was reviewed by: Adeline White PA-C on 07/16/2021:    Data reviewed: Radiologic studies CT          Assessment and Plan    Diagnoses and all orders for this visit:    1. Acute thoracic back pain, unspecified back pain laterality (Primary)  Assessment & Plan:  Discussed ddx pain, " likely MSK. Reviewed previous x-ray. CT abd showing stones. No enlargement of spleen (spoke w/radiologist to confirm). Heat/ice. NSAID and muscle relaxer.Toradol given an office, discussed short course of steriods due to severe pain but patient declined at this time.    Orders:  -     ketorolac (TORADOL) injection 60 mg  -     CT Abdomen Pelvis Stone Protocol; Future    2. Left flank pain  -     CT Abdomen Pelvis Stone Protocol; Future    3. Nephrolithiasis  Comments:  Discussed stones noted on CT, increase water intake, non obstructing and no urinary sx so will not start flomax at this time.  Orders:  -     Ambulatory Referral to Urology    4. Degenerative disc disease, lumbar  Comments:  Discussed DDD noted on L spine from CT scan, likely T spine affected as well.    5. Spondylolisthesis of lumbar region    Other orders  -     baclofen (LIORESAL) 10 MG tablet; Take 0.5 tablets by mouth Daily As Needed for Muscle Spasms.  Dispense: 15 tablet; Refill: 0      Follow Up   Return if symptoms worsen or fail to improve.  Patient was given instructions and counseling regarding her condition or for health maintenance advice. Please see specific information pulled into the AVS if appropriate.

## 2021-07-19 NOTE — ASSESSMENT & PLAN NOTE
Discussed ddx pain, likely MSK. Reviewed previous x-ray. CT abd showing stones. No enlargement of spleen (spoke w/radiologist to confirm). Heat/ice. NSAID and muscle relaxer.Toradol given an office, discussed short course of steriods due to severe pain but patient declined at this time.

## 2021-07-27 ENCOUNTER — OFFICE VISIT (OUTPATIENT)
Dept: INTERNAL MEDICINE | Facility: CLINIC | Age: 46
End: 2021-07-27

## 2021-07-27 VITALS
BODY MASS INDEX: 30.58 KG/M2 | DIASTOLIC BLOOD PRESSURE: 74 MMHG | HEIGHT: 61 IN | HEART RATE: 70 BPM | WEIGHT: 162 LBS | OXYGEN SATURATION: 98 % | TEMPERATURE: 98.2 F | SYSTOLIC BLOOD PRESSURE: 122 MMHG

## 2021-07-27 DIAGNOSIS — Z78.0 MENOPAUSE: ICD-10-CM

## 2021-07-27 DIAGNOSIS — G89.29 CHRONIC BILATERAL LOW BACK PAIN WITHOUT SCIATICA: ICD-10-CM

## 2021-07-27 DIAGNOSIS — M54.50 CHRONIC BILATERAL LOW BACK PAIN WITHOUT SCIATICA: ICD-10-CM

## 2021-07-27 DIAGNOSIS — F33.0 MAJOR DEPRESSIVE DISORDER, RECURRENT, MILD (HCC): ICD-10-CM

## 2021-07-27 DIAGNOSIS — C50.211 MALIGNANT NEOPLASM OF UPPER-INNER QUADRANT OF RIGHT BREAST IN FEMALE, ESTROGEN RECEPTOR NEGATIVE (HCC): ICD-10-CM

## 2021-07-27 DIAGNOSIS — R07.9 CHEST PAIN, UNSPECIFIED TYPE: Primary | ICD-10-CM

## 2021-07-27 DIAGNOSIS — Z17.1 MALIGNANT NEOPLASM OF UPPER-INNER QUADRANT OF RIGHT BREAST IN FEMALE, ESTROGEN RECEPTOR NEGATIVE (HCC): ICD-10-CM

## 2021-07-27 DIAGNOSIS — Z14.8 HEMOCHROMATOSIS CARRIER: ICD-10-CM

## 2021-07-27 LAB
BASOPHILS # BLD AUTO: 0.1 10*3/MM3 (ref 0–0.2)
BASOPHILS NFR BLD AUTO: 1.7 % (ref 0–1.5)
DEPRECATED RDW RBC AUTO: 42.5 FL (ref 37–54)
EOSINOPHIL # BLD AUTO: 0.17 10*3/MM3 (ref 0–0.4)
EOSINOPHIL NFR BLD AUTO: 2.9 % (ref 0.3–6.2)
ERYTHROCYTE [DISTWIDTH] IN BLOOD BY AUTOMATED COUNT: 12 % (ref 12.3–15.4)
FERRITIN SERPL-MCNC: 377 NG/ML (ref 13–150)
HCT VFR BLD AUTO: 34.3 % (ref 34–46.6)
HGB BLD-MCNC: 12 G/DL (ref 12–15.9)
IMM GRANULOCYTES # BLD AUTO: 0.01 10*3/MM3 (ref 0–0.05)
IMM GRANULOCYTES NFR BLD AUTO: 0.2 % (ref 0–0.5)
LYMPHOCYTES # BLD AUTO: 1.45 10*3/MM3 (ref 0.7–3.1)
LYMPHOCYTES NFR BLD AUTO: 24.3 % (ref 19.6–45.3)
MCH RBC QN AUTO: 34.1 PG (ref 26.6–33)
MCHC RBC AUTO-ENTMCNC: 35 G/DL (ref 31.5–35.7)
MCV RBC AUTO: 97.4 FL (ref 79–97)
MONOCYTES # BLD AUTO: 0.46 10*3/MM3 (ref 0.1–0.9)
MONOCYTES NFR BLD AUTO: 7.7 % (ref 5–12)
NEUTROPHILS NFR BLD AUTO: 3.77 10*3/MM3 (ref 1.7–7)
NEUTROPHILS NFR BLD AUTO: 63.2 % (ref 42.7–76)
NRBC BLD AUTO-RTO: 0 /100 WBC (ref 0–0.2)
PLATELET # BLD AUTO: 233 10*3/MM3 (ref 140–450)
PMV BLD AUTO: 11.6 FL (ref 6–12)
RBC # BLD AUTO: 3.52 10*6/MM3 (ref 3.77–5.28)
WBC # BLD AUTO: 5.96 10*3/MM3 (ref 3.4–10.8)

## 2021-07-27 PROCEDURE — 99214 OFFICE O/P EST MOD 30 MIN: CPT | Performed by: INTERNAL MEDICINE

## 2021-07-27 PROCEDURE — 82728 ASSAY OF FERRITIN: CPT | Performed by: INTERNAL MEDICINE

## 2021-07-27 PROCEDURE — 85025 COMPLETE CBC W/AUTO DIFF WBC: CPT | Performed by: INTERNAL MEDICINE

## 2021-07-27 RX ORDER — TIZANIDINE 2 MG/1
2 TABLET ORAL NIGHTLY PRN
Qty: 30 TABLET | Refills: 1 | Status: SHIPPED | OUTPATIENT
Start: 2021-07-27 | End: 2021-09-07 | Stop reason: SDUPTHER

## 2021-08-01 PROBLEM — Z78.0 MENOPAUSE: Status: ACTIVE | Noted: 2021-08-01

## 2021-08-01 PROBLEM — Z14.8 HEMOCHROMATOSIS CARRIER: Status: ACTIVE | Noted: 2021-08-01

## 2021-08-01 PROBLEM — Z17.1 MALIGNANT NEOPLASM OF UPPER-INNER QUADRANT OF RIGHT BREAST IN FEMALE, ESTROGEN RECEPTOR NEGATIVE (HCC): Status: ACTIVE | Noted: 2021-08-01

## 2021-08-01 PROBLEM — F33.0 MAJOR DEPRESSIVE DISORDER, RECURRENT, MILD: Status: ACTIVE | Noted: 2021-08-01

## 2021-08-01 PROBLEM — C50.211 MALIGNANT NEOPLASM OF UPPER-INNER QUADRANT OF RIGHT BREAST IN FEMALE, ESTROGEN RECEPTOR NEGATIVE: Status: ACTIVE | Noted: 2021-08-01

## 2021-08-31 ENCOUNTER — TELEPHONE (OUTPATIENT)
Dept: INTERNAL MEDICINE | Facility: CLINIC | Age: 46
End: 2021-08-31

## 2021-09-01 NOTE — TELEPHONE ENCOUNTER
Discussed with Dr. Ninfa henry to write letter stating that injection could be given in approved im sites around hip area due to breast cancer history along with mastectomy. Letter placed up front

## 2021-09-03 ENCOUNTER — OFFICE VISIT (OUTPATIENT)
Dept: UROLOGY | Facility: CLINIC | Age: 46
End: 2021-09-03

## 2021-09-03 VITALS
SYSTOLIC BLOOD PRESSURE: 118 MMHG | DIASTOLIC BLOOD PRESSURE: 58 MMHG | HEIGHT: 62 IN | WEIGHT: 162 LBS | BODY MASS INDEX: 29.81 KG/M2

## 2021-09-03 DIAGNOSIS — N20.0 NEPHROLITHIASIS: Primary | ICD-10-CM

## 2021-09-03 LAB
BILIRUB BLD-MCNC: NEGATIVE MG/DL
CLARITY, POC: CLEAR
COLOR UR: YELLOW
GLUCOSE UR STRIP-MCNC: NEGATIVE MG/DL
KETONES UR QL: NEGATIVE
LEUKOCYTE EST, POC: NEGATIVE
NITRITE UR-MCNC: NEGATIVE MG/ML
PH UR: 6 [PH] (ref 5–8)
PROT UR STRIP-MCNC: NEGATIVE MG/DL
RBC # UR STRIP: NEGATIVE /UL
SP GR UR: 1.02 (ref 1–1.03)
UROBILINOGEN UR QL: NORMAL

## 2021-09-03 PROCEDURE — 81003 URINALYSIS AUTO W/O SCOPE: CPT | Performed by: UROLOGY

## 2021-09-03 PROCEDURE — 99202 OFFICE O/P NEW SF 15 MIN: CPT | Performed by: UROLOGY

## 2021-09-03 NOTE — PROGRESS NOTES
"Chief Complaint  Establish Care (NEPHROLITHASIS)    Subjective          Keerthi Pillai presents to [facility]  She is concerned about why she is making stones.  She recently had a CT scan and the results are below.     CONCLUSION:   1. Bilateral nephrolithiasis measuring up to 0.2 centimeters on the right and 0.5 centimeters on   the left.  There is no ureteral stone no obstructive uropathy.      Objective   Vital Signs:   /58   Ht 157.5 cm (62\")   Wt 73.5 kg (162 lb)   BMI 29.63 kg/m²     Physical Exam  Vitals and nursing note reviewed.   Constitutional:       Appearance: Normal appearance. She is well-developed.   Pulmonary:      Effort: Pulmonary effort is normal.      Breath sounds: Normal air entry.   Neurological:      Mental Status: She is alert and oriented to person, place, and time.      Motor: Motor function is intact.   Psychiatric:         Mood and Affect: Mood normal.         Behavior: Behavior normal.        Result Review :                  Results for orders placed or performed in visit on 09/03/21   POC Urinalysis Dipstick, Automated    Specimen: Urine   Result Value Ref Range    Color Yellow Yellow, Straw, Dark Yellow, Sierra    Clarity, UA Clear Clear    Specific Gravity  1.020 1.005 - 1.030    pH, Urine 6.0 5.0 - 8.0    Leukocytes Negative Negative    Nitrite, UA Negative Negative    Protein, POC Negative Negative mg/dL    Glucose, UA Negative Negative, 1000 mg/dL (3+) mg/dL    Ketones, UA Negative Negative    Urobilinogen, UA Normal Normal    Bilirubin Negative Negative    Blood, UA Negative Negative       Assessment and Plan    Diagnoses and all orders for this visit:    1. Nephrolithiasis (Primary)  Assessment & Plan:  KUB  In one year.  Stone handouts given.      Orders:  -     POC Urinalysis Dipstick, Automated  -     XR Abdomen KUB; Future      Follow Up {Instructions Charge Capture  Follow-up Communications :23}  Return in about 1 year (around " 9/3/2022) for KUB prior.  Patient was given instructions and counseling regarding her condition or for health maintenance advice. Please see specific information pulled into the AVS if appropriate.        Post-Care Instructions: I reviewed with the patient in detail post-care instructions. Patient is not to engage in any heavy lifting, exercise, or swimming for the next 14 days. Should the patient develop any fevers, chills, bleeding, severe pain patient will contact the office immediately. Post surgical wound care given to patient.

## 2021-09-03 NOTE — PATIENT INSTRUCTIONS
"Textbook of Natural Medicine (5th ed., pp. 2950-0626.e3). [place], [place]: Elsevier.\">   Dietary Guidelines to Help Prevent Kidney Stones  Kidney stones are deposits of minerals and salts that form inside your kidneys. Your risk of developing kidney stones may be greater depending on your diet, your lifestyle, the medicines you take, and whether you have certain medical conditions. Most people can lower their chances of developing kidney stones by following the instructions below. Your dietitian may give you more specific instructions depending on your overall health and the type of kidney stones you tend to develop.  What are tips for following this plan?  Reading food labels    · Choose foods with \"no salt added\" or \"low-salt\" labels. Limit your salt (sodium) intake to less than 1,500 mg a day.  · Choose foods with calcium for each meal and snack. Try to eat about 300 mg of calcium at each meal. Foods that contain 200-500 mg of calcium a serving include:  ? 8 oz (237 mL) of milk, calcium-fortifiednon-dairy milk, and calcium-fortifiedfruit juice. Calcium-fortified means that calcium has been added to these drinks.  ? 8 oz (237 mL) of kefir, yogurt, and soy yogurt.  ? 4 oz (114 g) of tofu.  ? 1 oz (28 g) of cheese.  ? 1 cup (150 g) of dried figs.  ? 1 cup (91 g) of cooked broccoli.  ? One 3 oz (85 g) can of sardines or mackerel.  Most people need 1,000-1,500 mg of calcium a day. Talk to your dietitian about how much calcium is recommended for you.  Shopping  · Buy plenty of fresh fruits and vegetables. Most people do not need to avoid fruits and vegetables, even if these foods contain nutrients that may contribute to kidney stones.  · When shopping for convenience foods, choose:  ? Whole pieces of fruit.  ? Pre-made salads with dressing on the side.  ? Low-fat fruit and yogurt smoothies.  · Avoid buying frozen meals or prepared deli foods. These can be high in sodium.  · Look for foods with live cultures, such as " yogurt and kefir.  · Choose high-fiber grains, such as whole-wheat breads, oat bran, and wheat cereals.  Cooking  · Do not add salt to food when cooking. Place a salt shaker on the table and allow each person to add his or her own salt to taste.  · Use vegetable protein, such as beans, textured vegetable protein (TVP), or tofu, instead of meat in pasta, casseroles, and soups.  Meal planning  · Eat less salt, if told by your dietitian. To do this:  ? Avoid eating processed or pre-made food.  ? Avoid eating fast food.  · Eat less animal protein, including cheese, meat, poultry, or fish, if told by your dietitian. To do this:  ? Limit the number of times you have meat, poultry, fish, or cheese each week. Eat a diet free of meat at least 2 days a week.  ? Eat only one serving each day of meat, poultry, fish, or seafood.  ? When you prepare animal protein, cut pieces into small portion sizes. For most meat and fish, one serving is about the size of the palm of your hand.  · Eat at least five servings of fresh fruits and vegetables each day. To do this:  ? Keep fruits and vegetables on hand for snacks.  ? Eat one piece of fruit or a handful of berries with breakfast.  ? Have a salad and fruit at lunch.  ? Have two kinds of vegetables at dinner.  · Limit foods that are high in a substance called oxalate. These include:  ? Spinach (cooked), rhubarb, beets, sweet potatoes, and Swiss chard.  ? Peanuts.  ? Potato chips, french fries, and baked potatoes with skin on.  ? Nuts and nut products.  ? Chocolate.  · If you regularly take a diuretic medicine, make sure to eat at least 1 or 2 servings of fruits or vegetables that are high in potassium each day. These include:  ? Avocado.  ? Banana.  ? Orange, prune, carrot, or tomato juice.  ? Baked potato.  ? Cabbage.  ? Beans and split peas.  Lifestyle    · Drink enough fluid to keep your urine pale yellow. This is the most important thing you can do. Spread your fluid intake  throughout the day.  · If you drink alcohol:  ? Limit how much you use to:  § 0-1 drink a day for women who are not pregnant.  § 0-2 drinks a day for men.  ? Be aware of how much alcohol is in your drink. In the [place] one drink equals one 12 oz bottle of beer (355 mL), one 5 oz glass of wine (148 mL), or one 1½ oz glass of hard liquor (44 mL).  · Lose weight if told by your health care provider. Work with your dietitian to find an eating plan and weight loss strategies that work best for you.  General information  · Talk to your health care provider and dietitian about taking daily supplements. You may be told the following depending on your health and the cause of your kidney stones:  ? Not to take supplements with vitamin C.  ? To take a calcium supplement.  ? To take a daily probiotic supplement.  ? To take other supplements such as magnesium, fish oil, or vitamin B6.  · Take over-the-counter and prescription medicines only as told by your health care provider. These include supplements.  What foods should I limit?  Limit your intake of the following foods, or eat them as told by your dietitian.  Vegetables  Spinach. Rhubarb. Beets. Canned vegetables. Pickles. Olives. Baked potatoes with skin.  Grains  Wheat bran. Baked goods. Salted crackers. Cereals high in sugar.  Meats and other proteins  Nuts. Nut butters. Large portions of meat, poultry, or fish. Salted, precooked, or cured meats, such as sausages, meat loaves, and hot dogs.  Dairy  Cheese.  Beverages  Regular soft drinks. Regular vegetable juice.  Seasonings and condiments  Seasoning blends with salt. Salad dressings. Soy sauce. Ketchup. Barbecue sauce.  Other foods  Canned soups. Canned pasta sauce. Casseroles. Pizza. Lasagna. Frozen meals. Potato chips. French fries.  The items listed above may not be a complete list of foods and beverages you should limit. Contact a dietitian for more information.  What foods should I avoid?  Talk to your dietitian  about specific foods you should avoid based on the type of kidney stones you have and your overall health.  Fruits  Grapefruit.  The item listed above may not be a complete list of foods and beverages you should avoid. Contact a dietitian for more information.  Summary  · Kidney stones are deposits of minerals and salts that form inside your kidneys.  · You can lower your risk of kidney stones by making changes to your diet.  · The most important thing you can do is drink enough fluid. Drink enough fluid to keep your urine pale yellow.  · Talk to your dietitian about how much calcium you should have each day, and eat less salt and animal protein as told by your dietitian.  This information is not intended to replace advice given to you by your health care provider. Make sure you discuss any questions you have with your health care provider.  Document Revised: 12/10/2020 Document Reviewed: 12/10/2020  [employer] Patient Education © 2021 [employer]    Kidney Stones    Kidney stones are solid, rock-like deposits that form inside of the kidneys. The kidneys are a pair of organs that make urine. A kidney stone may form in a kidney and move into other parts of the urinary tract, including the tubes that connect the kidneys to the bladder (ureters), the bladder, and the tube that carries urine out of the body (urethra). As the stone moves through these areas, it can cause intense pain and block the flow of urine.  Kidney stones are created when high levels of certain minerals are found in the urine. The stones are usually passed out of the body through urination, but in some cases, medical treatment may be needed to remove them.  What are the causes?  Kidney stones may be caused by:  · A condition in which certain glands produce too much parathyroid hormone (primary hyperparathyroidism), which causes too much calcium buildup in the blood.  · A buildup of uric acid crystals in the bladder (hyperuricosuria). Uric acid is a  chemical that the body produces when you eat certain foods. It usually exits the body in the urine.  · Narrowing (stricture) of one or both of the ureters.  · A kidney blockage that is present at birth (congenital obstruction).  · Past surgery on the kidney or the ureters, such as gastric bypass surgery.  What increases the risk?  The following factors may make you more likely to develop this condition:  · Having had a kidney stone in the past.  · Having a family history of kidney stones.  · Not drinking enough water.  · Eating a diet that is high in protein, salt (sodium), or sugar.  · Being overweight or obese.  What are the signs or symptoms?  Symptoms of a kidney stone may include:  · Pain in the side of the abdomen, right below the ribs (flank pain). Pain usually spreads (radiates) to the groin.  · Needing to urinate frequently or urgently.  · Painful urination.  · Blood in the urine (hematuria).  · Nausea.  · Vomiting.  · Fever and chills.  How is this diagnosed?  This condition may be diagnosed based on:  · Your symptoms and medical history.  · A physical exam.  · Blood tests.  · Urine tests. These may be done before and after the stone passes out of your body through urination.  · Imaging tests, such as a CT scan, abdominal X-ray, or ultrasound.  · A procedure to examine the inside of the bladder (cystoscopy).  How is this treated?  Treatment for kidney stones depends on the size, location, and makeup of the stones. Kidney stones will often pass out of the body through urination. You may need to:  · Increase your fluid intake to help pass the stone. In some cases, you may be given fluids through an IV and may need to be monitored at the hospital.  · Take medicine for pain.  · Make changes in your diet to help prevent kidney stones from coming back.  Sometimes, medical procedures are needed to remove a kidney stone. This may involve:  · A procedure to break up kidney stones using:  ? A focused beam of light  (laser therapy).  ? Shock waves (extracorporeal shock wave lithotripsy).  · Surgery to remove kidney stones. This may be needed if you have severe pain or have stones that block your urinary tract.  Follow these instructions at home:  Medicines  · Take over-the-counter and prescription medicines only as told by your health care provider.  · Ask your health care provider if the medicine prescribed to you requires you to avoid driving or using heavy machinery.  Eating and drinking  · Drink enough fluid to keep your urine pale yellow. You may be instructed to drink at least 8-10 glasses of water each day. This will help you pass the kidney stone.  · If directed, change your diet. This may include:  ? Limiting how much sodium you eat.  ? Eating more fruits and vegetables.  ? Limiting how much animal protein--such as red meat, poultry, fish, and eggs--you eat.  · Follow instructions from your health care provider about eating or drinking restrictions.  General instructions  · Collect urine samples as told by your health care provider. You may need to collect a urine sample:  ? 24 hours after you pass the stone.  ? 8-12 weeks after passing the kidney stone, and every 6-12 months after that.  · Strain your urine every time you urinate, for as long as directed. Use the strainer that your health care provider recommends.  · Do not throw out the kidney stone after passing it. Keep the stone so it can be tested by your health care provider. Testing the makeup of your kidney stone may help prevent you from getting kidney stones in the future.  · Keep all follow-up visits as told by your health care provider. This is important. You may need follow-up X-rays or ultrasounds to make sure that your stone has passed.  How is this prevented?  To prevent another kidney stone:  · Drink enough fluid to keep your urine pale yellow. This is the best way to prevent kidney stones.  · Eat a healthy diet and follow recommendations from your  health care provider about foods to avoid. You may be instructed to eat a low-protein diet. Recommendations vary depending on the type of kidney stone that you have.  · Maintain a healthy weight.  Where to find more information  · National Kidney Foundation (NKF): www.kidney.org  · [employer] (UCF): www.urologyhealth.org  Contact a health care provider if:  · You have pain that gets worse or does not get better with medicine.  Get help right away if:  · You have a fever or chills.  · You develop severe pain.  · You develop new abdominal pain.  · You faint.  · You are unable to urinate.  Summary  · Kidney stones are solid, rock-like deposits that form inside of the kidneys.  · Kidney stones can cause nausea, vomiting, blood in the urine, abdominal pain, and the urge to urinate frequently.  · Treatment for kidney stones depends on the size, location, and makeup of the stones. Kidney stones will often pass out of the body through urination.  · Kidney stones can be prevented by drinking enough fluids, eating a healthy diet, and maintaining a healthy weight.  This information is not intended to replace advice given to you by your health care provider. Make sure you discuss any questions you have with your health care provider.  Document Revised: 05/05/2020 Document Reviewed: 05/05/2020  [employer] Patient Education © 2021 [employer]

## 2021-09-07 ENCOUNTER — TELEMEDICINE (OUTPATIENT)
Dept: INTERNAL MEDICINE | Facility: CLINIC | Age: 46
End: 2021-09-07

## 2021-09-07 DIAGNOSIS — C50.211 MALIGNANT NEOPLASM OF UPPER-INNER QUADRANT OF RIGHT BREAST IN FEMALE, ESTROGEN RECEPTOR NEGATIVE (HCC): ICD-10-CM

## 2021-09-07 DIAGNOSIS — F33.0 MAJOR DEPRESSIVE DISORDER, RECURRENT, MILD (HCC): Primary | ICD-10-CM

## 2021-09-07 DIAGNOSIS — M54.50 CHRONIC BILATERAL LOW BACK PAIN WITHOUT SCIATICA: ICD-10-CM

## 2021-09-07 DIAGNOSIS — Z14.8 HEMOCHROMATOSIS CARRIER: ICD-10-CM

## 2021-09-07 DIAGNOSIS — G89.29 CHRONIC BILATERAL LOW BACK PAIN WITHOUT SCIATICA: ICD-10-CM

## 2021-09-07 DIAGNOSIS — Z78.0 MENOPAUSE: ICD-10-CM

## 2021-09-07 DIAGNOSIS — Z17.1 MALIGNANT NEOPLASM OF UPPER-INNER QUADRANT OF RIGHT BREAST IN FEMALE, ESTROGEN RECEPTOR NEGATIVE (HCC): ICD-10-CM

## 2021-09-07 PROBLEM — M54.6 ACUTE THORACIC BACK PAIN: Status: RESOLVED | Noted: 2021-06-28 | Resolved: 2021-09-07

## 2021-09-07 PROCEDURE — 99214 OFFICE O/P EST MOD 30 MIN: CPT | Performed by: INTERNAL MEDICINE

## 2021-09-07 RX ORDER — TIZANIDINE 2 MG/1
2 TABLET ORAL NIGHTLY PRN
Qty: 90 TABLET | Refills: 0 | Status: SHIPPED | OUTPATIENT
Start: 2021-09-07 | End: 2021-12-08 | Stop reason: SDUPTHER

## 2021-09-07 NOTE — PROGRESS NOTES
Chief Complaint  No chief complaint on file.  Follow up for stress and back pain    Subjective          Keerthi Pillai presents to [facility]  History of Present Illness  Patient understanding that this is a telehealth visit.  I cannot perform a full physical exam, therefore something might be missed, or patient may need to come into the office for further evaluation.  Patient is understanding and consents to this type of visit.  doximity    States that she is working on getting her vaccine  Hasn't gotten her cxr yet  When she takes her muscle relaxers it doesn't usually bother her  She is riding a new horse and doing well with that  Planning to get the covid vaccine tomorrow if possible    Sore throat for a few days  +body aches  No fever  This was about 1.5 weeks ago  Didn't test for covid    Stress is well controlled on current meds    HTN-  Well controlled on current meds  114/58  No chest pain  No trouble breathing  No side effects    Hormone therapy-  Still doing well    Hemochromatosis carrier-  Ferritin did decrease after blood donation      Objective   Vital Signs:   There were no vitals taken for this visit.    Physical Exam   Result Review :     Gen: well-nourished, no acute distress  HENT: atraumatic, normocephalic  Eyes: extraocular movements intact, no scleral icterus  Lung: breathing comfortably, no cough  Skin: no visible rash, no lesions  Neuro: grossly oriented to person, place, and time. no facial droop   Psych: normal mood and affect      Common labs    Common Labsle 4/16/21 4/16/21 6/28/21 6/28/21 7/27/21    1503 1503 1651 1651    Glucose    94    Glucose  94      BUN  12  12    Creatinine  0.75  0.65    eGFR Non African Am    99    Sodium  137  141    Potassium  3.8  4.1    Chloride  106  105    Calcium  8.6 (A)  8.9    Albumin  3.9  4.20    Total Bilirubin  0.36  0.3    Alkaline Phosphatase  60  65    AST (SGOT)  19  14    ALT (SGPT)  15  15     WBC 5.67  6.63  5.96   Hemoglobin 12.4  13.4  12.0   Hematocrit 35.7 (A)  39.6  34.3   Platelets 238  247  233   (A) Abnormal value       Comments are available for some flowsheets but are not being displayed.              Procedures      Assessment and Plan    Diagnoses and all orders for this visit:    1. Major depressive disorder, recurrent, mild (CMS/HCC) (Primary)  Comments:  doing great, cont current meds    2. Malignant neoplasm of upper-inner quadrant of right breast in female, estrogen receptor negative (CMS/HCC)  Comments:  stable, understands the risk of hormone therapy, will get covid shot in IM other than deltoid    3. Chronic bilateral low back pain without sciatica  Comments:  await CXR, cont muscle relaxer    4. Hemochromatosis carrier  Comments:  discussed importance of blood donation every 60 days    5. Menopause  Comments:  doing well on current meds    Other orders  -     tiZANidine (ZANAFLEX) 2 MG tablet; Take 1 tablet by mouth At Night As Needed for Muscle Spasms.  Dispense: 90 tablet; Refill: 0        Follow Up   Return in about 3 months (around 12/7/2021).  Patient was given instructions and counseling regarding her condition or for health maintenance advice. Please see specific information pulled into the AVS if appropriate.

## 2021-09-10 ENCOUNTER — TELEPHONE (OUTPATIENT)
Dept: INTERNAL MEDICINE | Facility: CLINIC | Age: 46
End: 2021-09-10

## 2021-09-10 NOTE — TELEPHONE ENCOUNTER
Patient has letter stating she can receive covid vaccine in other approved IM sites but wanted to know if we knew any place that would give this. Please advise.

## 2021-09-16 NOTE — TELEPHONE ENCOUNTER
"I don't but I\"m happy to talk to a pharmacist if that would help, or we could see about giving it at the hospital  "

## 2021-09-25 NOTE — TELEPHONE ENCOUNTER
Got the first vaccine in her thigh at [facility] in [place] and she should be able to get her second one there also. No other issues or concerns noted currently per patient.

## 2021-10-19 NOTE — TELEPHONE ENCOUNTER
Caller: Keerthi Pillai    Relationship: Self      Medication requested (name and dosage):     Requested Prescriptions:   Requested Prescriptions     Pending Prescriptions Disp Refills   • celecoxib (CeleBREX) 200 MG capsule [Pharmacy Med Name: celecoxib 200 mg capsule] 90 capsule 0     Sig: TAKE ONE CAPSULE BY MOUTH EVERY DAY   • Trintellix 10 MG tablet [Pharmacy Med Name: Trintellix 10 mg tablet] 60 tablet 0     Sig: TAKE ONE TABLET BY MOUTH EVERY DAY AT THE SAME TIME EACH DAY        Pharmacy where request should be sent:    [id number] - [place], [place] - [address] [facility] - [phone]  - [phone] FX       Additional details provided by patient:      PATIENT STATED THAT THE PHARMACY INFORMED HER THAT SHE HAS NO REFILLS OF THESE MEDICATIONS. PATIENT WOULD LIKE FOR PCP TO REFILL THE MEDICATIONS AND CALL  TO DISCUSS IF THIS CAN NOT BE DONE.     Best call back number: [phone]    Does the patient have less than a 3 day supply:  [x] Yes  [] No    Martha Hart Rep   10/19/21 14:38 EDT

## 2021-10-20 ENCOUNTER — OFFICE VISIT (OUTPATIENT)
Dept: INTERNAL MEDICINE | Facility: CLINIC | Age: 46
End: 2021-10-20

## 2021-10-20 ENCOUNTER — TELEPHONE (OUTPATIENT)
Dept: INTERNAL MEDICINE | Facility: CLINIC | Age: 46
End: 2021-10-20

## 2021-10-20 VITALS
TEMPERATURE: 97.1 F | WEIGHT: 161.2 LBS | HEIGHT: 61 IN | RESPIRATION RATE: 18 BRPM | DIASTOLIC BLOOD PRESSURE: 64 MMHG | HEART RATE: 81 BPM | OXYGEN SATURATION: 99 % | BODY MASS INDEX: 30.43 KG/M2 | SYSTOLIC BLOOD PRESSURE: 118 MMHG

## 2021-10-20 DIAGNOSIS — G43.009 MIGRAINE WITHOUT AURA AND WITHOUT STATUS MIGRAINOSUS, NOT INTRACTABLE: Primary | ICD-10-CM

## 2021-10-20 PROCEDURE — 96372 THER/PROPH/DIAG INJ SC/IM: CPT | Performed by: NURSE PRACTITIONER

## 2021-10-20 PROCEDURE — 99213 OFFICE O/P EST LOW 20 MIN: CPT | Performed by: NURSE PRACTITIONER

## 2021-10-20 RX ORDER — KETOROLAC TROMETHAMINE 30 MG/ML
30 INJECTION, SOLUTION INTRAMUSCULAR; INTRAVENOUS ONCE
Status: COMPLETED | OUTPATIENT
Start: 2021-10-20 | End: 2021-10-20

## 2021-10-20 RX ORDER — VORTIOXETINE 10 MG/1
TABLET, FILM COATED ORAL
Qty: 90 TABLET | Refills: 1 | Status: SHIPPED | OUTPATIENT
Start: 2021-10-20 | End: 2021-12-08

## 2021-10-20 RX ORDER — CELECOXIB 200 MG/1
CAPSULE ORAL
Qty: 90 CAPSULE | Refills: 1 | Status: SHIPPED | OUTPATIENT
Start: 2021-10-20 | End: 2022-05-11 | Stop reason: SDUPTHER

## 2021-10-20 RX ADMIN — KETOROLAC TROMETHAMINE 30 MG: 30 INJECTION, SOLUTION INTRAMUSCULAR; INTRAVENOUS at 12:07

## 2021-10-20 NOTE — PATIENT INSTRUCTIONS
First off, thank you for allowing me to take part of your care today.  You did receive a Toradol injection today, please do not take any NSAIDs including Motrin and Celebrex today.  You may restart that tomorrow.  If your symptoms do not subside after tomorrow please follow back up with us.

## 2021-10-20 NOTE — TELEPHONE ENCOUNTER
Patient called stating she has had a bad headache since Monday. Not relieved with medication. Worse with light and has nausea as well. This is lasting much longer than her typical migraines. Patient will come in to see Jasper at 11:15 today.

## 2021-10-20 NOTE — PROGRESS NOTES
"Chief Complaint  Headache (Present since 10/18)    Subjective         Keerthi Pillai presents to [facility] for Migraine headache.    Patient reports yesterday she woke up and had a migraine.  She did call into work yesterday.  She used some Motrin and Tylenol yesterday with little relief.  She did lie down which did help the headaches subside but did return back to full strength after she woke.  This morning she woke up and felt a little better and did take her kids to school and going to work.  She did not last very long before the headache returned at full strength which required her to leave the office and go home.  She does report that she has had headaches like this in the past, once or twice annually.  They normally will subside on their own after 24 to 36 hours.  There have been some occasions where they have lasted beyond that which prompted her to seek care at urgent care.  They have traditionally used Toradol injections to relieve the headache.  She does come in requesting that today.         Review of Systems   Constitutional: Negative for chills, fatigue and fever.   HENT: Negative for congestion, sinus pressure, sinus pain, sneezing and sore throat.    Respiratory: Negative for cough and shortness of breath.    Cardiovascular: Negative for chest pain and palpitations.   Gastrointestinal: Negative for diarrhea, nausea and vomiting.   Musculoskeletal: Negative for neck stiffness.   Skin: Negative for rash.   Neurological: Positive for headaches. Negative for dizziness, weakness and light-headedness.       Objective   Vital Signs:   /64   Pulse 81   Temp 97.1 °F (36.2 °C)   Resp 18   Ht 154.9 cm (61\")   Wt 73.1 kg (161 lb 3.2 oz)   SpO2 99%   BMI 30.46 kg/m²     Physical Exam  Vitals and nursing note reviewed.   Constitutional:       Appearance: Normal appearance. She is normal weight.   HENT:      Head: Normocephalic and atraumatic.   Eyes:      " Conjunctiva/sclera: Conjunctivae normal.      Pupils: Pupils are equal, round, and reactive to light.   Cardiovascular:      Rate and Rhythm: Normal rate and regular rhythm.   Pulmonary:      Effort: Pulmonary effort is normal.      Breath sounds: Normal breath sounds.   Musculoskeletal:      Cervical back: Normal range of motion. No rigidity or tenderness.   Neurological:      General: No focal deficit present.      Mental Status: She is alert and oriented to person, place, and time.   Psychiatric:         Mood and Affect: Mood normal.         Thought Content: Thought content normal.        Result Review :                   Diagnoses and all orders for this visit:    1. Migraine without aura and without status migrainosus, not intractable (Primary)  Assessment & Plan:  Patient currently with migraine.  She has had similar headaches like this in the past, she does not get these very often, maybe twice a year.  They normally will go away in 24 to 36 hours.  There have been some occasions where that has not happened and she has had to seek care at urgent care which she received Toradol injections which did help with the migraine.  We will give Toradol today.  Did advise to not use any NSAIDs including her Celebrex today.  She can start those back tomorrow.  Did advise to follow-up if her symptoms do not subside after tomorrow.        Orders:  -     ketorolac (TORADOL) injection 30 mg        Follow Up   Return if symptoms worsen or fail to improve.  Patient was given instructions and counseling regarding her condition or for health maintenance advice. Please see specific information pulled into the AVS if appropriate.     ANATOLIY Collado  10/20/2021  This note was electronically signed.

## 2021-10-20 NOTE — ASSESSMENT & PLAN NOTE
Patient currently with migraine.  She has had similar headaches like this in the past, she does not get these very often, maybe twice a year.  They normally will go away in 24 to 36 hours.  There have been some occasions where that has not happened and she has had to seek care at urgent care which she received Toradol injections which did help with the migraine.  We will give Toradol today.  Did advise to not use any NSAIDs including her Celebrex today.  She can start those back tomorrow.  Did advise to follow-up if her symptoms do not subside after tomorrow.

## 2021-10-22 ENCOUNTER — TELEPHONE (OUTPATIENT)
Dept: INTERNAL MEDICINE | Facility: CLINIC | Age: 46
End: 2021-10-22

## 2021-11-09 ENCOUNTER — TELEPHONE (OUTPATIENT)
Dept: INTERNAL MEDICINE | Facility: CLINIC | Age: 46
End: 2021-11-09

## 2021-11-09 NOTE — TELEPHONE ENCOUNTER
Caller: Keerthi Pillai    Relationship: Self    Best call back number: [phone]    What test/procedure requested: celecoxib (CeleBREX) 200 MG capsule  When is it needed: ASAP    Additional information or concerns: PATIENT STATED THAT SHE HAS BEEN OUT OF MEDICATION FOR 2 WEEKS. PHARMACY FAXED OVER REQUEST FOR THE PRIOR AUTHORIZATION.  MEDICATION NEEDS A PRIOR AUTHORIZATION. PLEASE CALL PATIENT WHEN PRIOR AUTHORIZATION IS COMPLETE.

## 2021-12-08 ENCOUNTER — OFFICE VISIT (OUTPATIENT)
Dept: INTERNAL MEDICINE | Facility: CLINIC | Age: 46
End: 2021-12-08

## 2021-12-08 VITALS
BODY MASS INDEX: 30.81 KG/M2 | TEMPERATURE: 99.5 F | OXYGEN SATURATION: 98 % | WEIGHT: 163.2 LBS | RESPIRATION RATE: 18 BRPM | HEIGHT: 61 IN | HEART RATE: 98 BPM | SYSTOLIC BLOOD PRESSURE: 110 MMHG | DIASTOLIC BLOOD PRESSURE: 70 MMHG

## 2021-12-08 DIAGNOSIS — F33.0 MAJOR DEPRESSIVE DISORDER, RECURRENT, MILD (HCC): Primary | ICD-10-CM

## 2021-12-08 DIAGNOSIS — Z78.0 MENOPAUSE: ICD-10-CM

## 2021-12-08 DIAGNOSIS — R53.83 OTHER FATIGUE: ICD-10-CM

## 2021-12-08 DIAGNOSIS — R40.0 HAS DAYTIME DROWSINESS: ICD-10-CM

## 2021-12-08 DIAGNOSIS — Z13.220 SCREENING, LIPID: ICD-10-CM

## 2021-12-08 DIAGNOSIS — Z14.8 HEMOCHROMATOSIS CARRIER: ICD-10-CM

## 2021-12-08 LAB
ALBUMIN SERPL-MCNC: 4.1 G/DL (ref 3.5–5.2)
ALBUMIN/GLOB SERPL: 1.2 G/DL
ALP SERPL-CCNC: 66 U/L (ref 39–117)
ALT SERPL W P-5'-P-CCNC: 20 U/L (ref 1–33)
ANION GAP SERPL CALCULATED.3IONS-SCNC: 9.4 MMOL/L (ref 5–15)
AST SERPL-CCNC: 18 U/L (ref 1–32)
BASOPHILS # BLD AUTO: 0.13 10*3/MM3 (ref 0–0.2)
BASOPHILS NFR BLD AUTO: 1.7 % (ref 0–1.5)
BILIRUB SERPL-MCNC: 0.4 MG/DL (ref 0–1.2)
BUN SERPL-MCNC: 16 MG/DL (ref 6–20)
BUN/CREAT SERPL: 26.2 (ref 7–25)
CALCIUM SPEC-SCNC: 9.3 MG/DL (ref 8.6–10.5)
CHLORIDE SERPL-SCNC: 102 MMOL/L (ref 98–107)
CHOLEST SERPL-MCNC: 173 MG/DL (ref 0–200)
CO2 SERPL-SCNC: 26.6 MMOL/L (ref 22–29)
CREAT SERPL-MCNC: 0.61 MG/DL (ref 0.57–1)
DEPRECATED RDW RBC AUTO: 42.1 FL (ref 37–54)
EOSINOPHIL # BLD AUTO: 0.18 10*3/MM3 (ref 0–0.4)
EOSINOPHIL NFR BLD AUTO: 2.4 % (ref 0.3–6.2)
ERYTHROCYTE [DISTWIDTH] IN BLOOD BY AUTOMATED COUNT: 11.7 % (ref 12.3–15.4)
FERRITIN SERPL-MCNC: 638 NG/ML (ref 13–150)
GFR SERPL CREATININE-BSD FRML MDRD: 106 ML/MIN/1.73
GLOBULIN UR ELPH-MCNC: 3.5 GM/DL
GLUCOSE SERPL-MCNC: 104 MG/DL (ref 65–99)
HCT VFR BLD AUTO: 40.2 % (ref 34–46.6)
HDLC SERPL-MCNC: 39 MG/DL (ref 40–60)
HGB BLD-MCNC: 14.1 G/DL (ref 12–15.9)
IMM GRANULOCYTES # BLD AUTO: 0.04 10*3/MM3 (ref 0–0.05)
IMM GRANULOCYTES NFR BLD AUTO: 0.5 % (ref 0–0.5)
IRON 24H UR-MRATE: 227 MCG/DL (ref 37–145)
IRON SATN MFR SERPL: 75 % (ref 20–50)
LDLC SERPL CALC-MCNC: 106 MG/DL (ref 0–100)
LDLC/HDLC SERPL: 2.63 {RATIO}
LYMPHOCYTES # BLD AUTO: 1.57 10*3/MM3 (ref 0.7–3.1)
LYMPHOCYTES NFR BLD AUTO: 20.7 % (ref 19.6–45.3)
MCH RBC QN AUTO: 34.6 PG (ref 26.6–33)
MCHC RBC AUTO-ENTMCNC: 35.1 G/DL (ref 31.5–35.7)
MCV RBC AUTO: 98.5 FL (ref 79–97)
MONOCYTES # BLD AUTO: 0.64 10*3/MM3 (ref 0.1–0.9)
MONOCYTES NFR BLD AUTO: 8.5 % (ref 5–12)
NEUTROPHILS NFR BLD AUTO: 5.01 10*3/MM3 (ref 1.7–7)
NEUTROPHILS NFR BLD AUTO: 66.2 % (ref 42.7–76)
NRBC BLD AUTO-RTO: 0 /100 WBC (ref 0–0.2)
PLATELET # BLD AUTO: 276 10*3/MM3 (ref 140–450)
PMV BLD AUTO: 11.5 FL (ref 6–12)
POTASSIUM SERPL-SCNC: 4 MMOL/L (ref 3.5–5.2)
PROT SERPL-MCNC: 7.6 G/DL (ref 6–8.5)
RBC # BLD AUTO: 4.08 10*6/MM3 (ref 3.77–5.28)
SODIUM SERPL-SCNC: 138 MMOL/L (ref 136–145)
TIBC SERPL-MCNC: 302 MCG/DL (ref 298–536)
TRANSFERRIN SERPL-MCNC: 203 MG/DL (ref 200–360)
TRIGL SERPL-MCNC: 158 MG/DL (ref 0–150)
TSH SERPL DL<=0.05 MIU/L-ACNC: 0.58 UIU/ML (ref 0.27–4.2)
VLDLC SERPL-MCNC: 28 MG/DL (ref 5–40)
WBC NRBC COR # BLD: 7.57 10*3/MM3 (ref 3.4–10.8)

## 2021-12-08 PROCEDURE — 82728 ASSAY OF FERRITIN: CPT | Performed by: INTERNAL MEDICINE

## 2021-12-08 PROCEDURE — 80061 LIPID PANEL: CPT | Performed by: INTERNAL MEDICINE

## 2021-12-08 PROCEDURE — 80053 COMPREHEN METABOLIC PANEL: CPT | Performed by: INTERNAL MEDICINE

## 2021-12-08 PROCEDURE — 99214 OFFICE O/P EST MOD 30 MIN: CPT | Performed by: INTERNAL MEDICINE

## 2021-12-08 PROCEDURE — 83540 ASSAY OF IRON: CPT | Performed by: INTERNAL MEDICINE

## 2021-12-08 PROCEDURE — 84466 ASSAY OF TRANSFERRIN: CPT | Performed by: INTERNAL MEDICINE

## 2021-12-08 PROCEDURE — 84443 ASSAY THYROID STIM HORMONE: CPT | Performed by: INTERNAL MEDICINE

## 2021-12-08 PROCEDURE — 85025 COMPLETE CBC W/AUTO DIFF WBC: CPT | Performed by: INTERNAL MEDICINE

## 2021-12-08 RX ORDER — DESVENLAFAXINE 25 MG/1
25 TABLET, EXTENDED RELEASE ORAL DAILY
Qty: 90 TABLET | Refills: 1 | Status: SHIPPED | OUTPATIENT
Start: 2021-12-08 | End: 2022-02-09 | Stop reason: SDUPTHER

## 2021-12-08 RX ORDER — TIZANIDINE 2 MG/1
2 TABLET ORAL NIGHTLY PRN
Qty: 90 TABLET | Refills: 1 | Status: SHIPPED | OUTPATIENT
Start: 2021-12-08 | End: 2022-05-11 | Stop reason: SDUPTHER

## 2021-12-08 NOTE — PROGRESS NOTES
"Chief Complaint  medication follow-up    Subjective          Keerthi Pillai presents to [facility]  History of Present Illness    States that she felt very poorly off the celebrex  She ran out of the medicine because she needed a PA and that took awhile to get    Would like to switch the trintellix because it is expensive    Had an episode where her back flared a little, but doing better, would like a refill of her muscle relaxers    HTN-  Usually in the 120's  No chest pain  No trouble    Headache is much better  She took some of her mom's amoxicillin and after 7 days she felt better    Objective   Vital Signs:   /70 (BP Location: Left arm, Patient Position: Sitting)   Pulse 98   Temp 99.5 °F (37.5 °C) (Oral)   Resp 18   Ht 154.9 cm (60.98\")   Wt 74 kg (163 lb 3.2 oz)   SpO2 98%   BMI 30.85 kg/m²     Physical Exam  Vitals reviewed.   Constitutional:       Appearance: Normal appearance. She is well-developed.   HENT:      Head: Normocephalic and atraumatic.      Right Ear: External ear normal.      Left Ear: External ear normal.   Eyes:      Conjunctiva/sclera: Conjunctivae normal.      Pupils: Pupils are equal, round, and reactive to light.   Cardiovascular:      Rate and Rhythm: Normal rate and regular rhythm.      Heart sounds: No murmur heard.  No friction rub. No gallop.    Pulmonary:      Effort: Pulmonary effort is normal.      Breath sounds: Normal breath sounds. No wheezing or rhonchi.   Skin:     General: Skin is warm and dry.   Neurological:      Mental Status: She is alert and oriented to person, place, and time.      Cranial Nerves: No cranial nerve deficit.   Psychiatric:         Mood and Affect: Affect normal.         Behavior: Behavior normal.         Thought Content: Thought content normal.        Result Review :     Common labs    Common Labsle 6/28/21 6/28/21 7/27/21 12/8/21 12/8/21 12/8/21    1651 1651  1427 1427 1427   Glucose  94  "   104 (A)   BUN  12    16   Creatinine  0.65    0.61   eGFR Non African Am  99    106   Sodium  141    138   Potassium  4.1    4.0   Chloride  105    102   Calcium  8.9    9.3   Albumin  4.20    4.10   Total Bilirubin  0.3    0.4   Alkaline Phosphatase  65    66   AST (SGOT)  14    18   ALT (SGPT)  15    20   WBC 6.63  5.96 7.57     Hemoglobin 13.4  12.0 14.1     Hematocrit 39.6  34.3 40.2     Platelets 247  233 276     Total Cholesterol     173    Triglycerides     158 (A)    HDL Cholesterol     39 (A)    LDL Cholesterol      106 (A)    (A) Abnormal value               Procedures      Assessment and Plan    Diagnoses and all orders for this visit:    1. Major depressive disorder, recurrent, mild (HCC) (Primary)  Assessment & Plan:  Will try switching from trintellix to pristiq and see if that helps cost.    Orders:  -     Comprehensive Metabolic Panel  -     CBC & Differential  -     TSH  -     Lipid Panel    2. Menopause  Comments:  doing well cont current meds  Orders:  -     Comprehensive Metabolic Panel  -     CBC & Differential  -     TSH  -     Lipid Panel    3. Hemochromatosis carrier  Comments:  cont with blood draws  Orders:  -     Ferritin; Future  -     Iron Profile  -     Ferritin    4. Screening, lipid  -     Lipid Panel    5. Other fatigue  Comments:  will check labs and adjust  Orders:  -     Ambulatory Referral to Sleep Medicine    6. Has daytime drowsiness  Comments:  Referral to sleep center  Orders:  -     Ambulatory Referral to Sleep Medicine    Other orders  -     tiZANidine (ZANAFLEX) 2 MG tablet; Take 1 tablet by mouth At Night As Needed for Muscle Spasms.  Dispense: 90 tablet; Refill: 1  -     Desvenlafaxine Succinate ER (Pristiq) 25 MG tablet sustained-release 24 hour; Take 1 tablet by mouth Daily.  Dispense: 90 tablet; Refill: 1            Follow Up   Return in about 2 months (around 2/8/2022).  Patient was given instructions and counseling regarding her condition or for health maintenance  advice. Please see specific information pulled into the AVS if appropriate.

## 2022-01-13 ENCOUNTER — OFFICE VISIT (OUTPATIENT)
Dept: INTERNAL MEDICINE | Facility: CLINIC | Age: 47
End: 2022-01-13

## 2022-01-13 VITALS
TEMPERATURE: 97.6 F | OXYGEN SATURATION: 100 % | HEIGHT: 61 IN | WEIGHT: 165 LBS | BODY MASS INDEX: 31.15 KG/M2 | DIASTOLIC BLOOD PRESSURE: 62 MMHG | HEART RATE: 86 BPM | SYSTOLIC BLOOD PRESSURE: 120 MMHG | RESPIRATION RATE: 12 BRPM

## 2022-01-13 DIAGNOSIS — R05.9 COUGH: ICD-10-CM

## 2022-01-13 DIAGNOSIS — U07.1 COVID-19: Primary | ICD-10-CM

## 2022-01-13 LAB
EXPIRATION DATE: ABNORMAL
EXPIRATION DATE: ABNORMAL
FLUAV AG NPH QL: POSITIVE
FLUBV AG NPH QL: NEGATIVE
INTERNAL CONTROL: ABNORMAL
INTERNAL CONTROL: ABNORMAL
Lab: ABNORMAL
Lab: ABNORMAL
SARS-COV-2 AG UPPER RESP QL IA.RAPID: DETECTED

## 2022-01-13 PROCEDURE — 87804 INFLUENZA ASSAY W/OPTIC: CPT | Performed by: NURSE PRACTITIONER

## 2022-01-13 PROCEDURE — 99213 OFFICE O/P EST LOW 20 MIN: CPT | Performed by: NURSE PRACTITIONER

## 2022-01-13 PROCEDURE — 87426 SARSCOV CORONAVIRUS AG IA: CPT | Performed by: NURSE PRACTITIONER

## 2022-01-13 RX ORDER — BROMPHENIRAMINE MALEATE, PSEUDOEPHEDRINE HYDROCHLORIDE, AND DEXTROMETHORPHAN HYDROBROMIDE 2; 30; 10 MG/5ML; MG/5ML; MG/5ML
10 SYRUP ORAL 3 TIMES DAILY PRN
Qty: 118 ML | Refills: 0 | Status: SHIPPED | OUTPATIENT
Start: 2022-01-13 | End: 2022-05-11

## 2022-01-13 RX ORDER — BENZONATATE 200 MG/1
200 CAPSULE ORAL 3 TIMES DAILY PRN
Qty: 15 CAPSULE | Refills: 0 | Status: SHIPPED | OUTPATIENT
Start: 2022-01-13 | End: 2022-05-11

## 2022-01-13 NOTE — PROGRESS NOTES
"Chief Complaint  Cough (Started on monday), Headache, and Nasal Congestion    Subjective         Keerthi Pillai [facility] for Cough, headache, nasal congestion, chills, fatigue. Patient reports she started having symptoms on Monday. She is fully COVID vaccinated. There have been other children over the last few days they have started to have symptoms as well. She was concerned for possible COVID. Would like to be tested today.         Review of Systems   Constitutional: Positive for chills and fatigue. Negative for fever.   HENT: Positive for congestion and sinus pressure. Negative for sneezing and sore throat.    Respiratory: Positive for cough. Negative for shortness of breath.    Gastrointestinal: Negative for diarrhea, nausea and vomiting.   Musculoskeletal: Positive for myalgias.   Neurological: Positive for headaches.       Objective   Vital Signs:   /62   Pulse 86   Temp 97.6 °F (36.4 °C)   Resp 12   Ht 154.9 cm (61\")   Wt 74.8 kg (165 lb)   SpO2 100%   BMI 31.18 kg/m²     Physical Exam  Vitals and nursing note reviewed.   Constitutional:       Appearance: Normal appearance. She is normal weight.   HENT:      Head: Normocephalic and atraumatic.      Right Ear: Tympanic membrane normal.      Left Ear: Tympanic membrane normal.      Nose: Congestion present.      Mouth/Throat:      Mouth: Mucous membranes are moist.      Pharynx: Oropharynx is clear.   Eyes:      Conjunctiva/sclera: Conjunctivae normal.      Pupils: Pupils are equal, round, and reactive to light.   Cardiovascular:      Rate and Rhythm: Normal rate and regular rhythm.   Pulmonary:      Effort: Pulmonary effort is normal.      Breath sounds: Normal breath sounds.   Neurological:      General: No focal deficit present.      Mental Status: She is alert.   Psychiatric:         Mood and Affect: Mood normal.         Thought Content: Thought content normal.        Result Review :                "   Results for orders placed or performed in visit on 01/13/22   POCT SARS-CoV-2 Antigen JOSEPHINE    Specimen: Swab   Result Value Ref Range    SARS Antigen Detected (A) Not Detected    Internal Control Passed Passed    Lot Number [id number]     Expiration Date 3,202,023    POC Influenza A / B    Specimen: Swab   Result Value Ref Range    Rapid Influenza A Ag Positive (A) Negative    Rapid Influenza B Ag Negative Negative    Internal Control Passed Passed    Lot Number [id number]     Expiration Date 5,052,022          Diagnoses and all orders for this visit:    1. COVID-19 (Primary)  Assessment & Plan:  Patient did test positive for COVID and influenza, we did discuss these diagnoses at length. We did discuss current guidelines for quarantine and return to work. She was concerned about her children, she is going to try to get them on exam over the next couple days for testing for school purposes. Medications have been sent in for her to use for symptoms. Advised to increase fluids and rest. Follow-up if symptoms worsen or persist, if they are severe go to the ER.      2. Cough  -     POCT SARS-CoV-2 Antigen JOSEPHINE  -     POC Influenza A / B        Follow Up   Return if symptoms worsen or fail to improve.  Patient was given instructions and counseling regarding her condition or for health maintenance advice. Please see specific information pulled into the AVS if appropriate.     Jasper Beavers, ANATOLIY  1/13/2022  This note was electronically signed.

## 2022-01-13 NOTE — ASSESSMENT & PLAN NOTE
Patient did test positive for COVID and influenza, we did discuss these diagnoses at length. We did discuss current guidelines for quarantine and return to work. She was concerned about her children, she is going to try to get them on exam over the next couple days for testing for school purposes. Medications have been sent in for her to use for symptoms. Advised to increase fluids and rest. Follow-up if symptoms worsen or persist, if they are severe go to the ER.

## 2022-01-27 ENCOUNTER — OFFICE VISIT (OUTPATIENT)
Dept: SLEEP MEDICINE | Facility: HOSPITAL | Age: 47
End: 2022-01-27

## 2022-01-27 VITALS
HEART RATE: 79 BPM | WEIGHT: 166 LBS | DIASTOLIC BLOOD PRESSURE: 43 MMHG | BODY MASS INDEX: 31.34 KG/M2 | SYSTOLIC BLOOD PRESSURE: 136 MMHG | HEIGHT: 61 IN | OXYGEN SATURATION: 99 %

## 2022-01-27 DIAGNOSIS — R40.0 HAS DAYTIME DROWSINESS: ICD-10-CM

## 2022-01-27 DIAGNOSIS — G47.419 NARCOLEPSY WITHOUT CATAPLEXY: Primary | ICD-10-CM

## 2022-01-27 DIAGNOSIS — R53.82 CHRONIC FATIGUE: ICD-10-CM

## 2022-01-27 PROCEDURE — 99204 OFFICE O/P NEW MOD 45 MIN: CPT | Performed by: INTERNAL MEDICINE

## 2022-01-27 PROCEDURE — G0463 HOSPITAL OUTPT CLINIC VISIT: HCPCS

## 2022-01-27 NOTE — PROGRESS NOTES
[facility]  [address]  [place] [address]  Phone   Fax       Keerthi CARLOS Rosas  1975  46 y.o.  female      Referring physician/provider and PCP Jennifer Ocasio MD    Type of service: Initial Sleep Medicine Consult.  Date of service: 1/27/2022      Chief Complaint   Patient presents with   • Daytime Sleepiness       History of present illness;  Thank you for asking to see Keerthi Pillai, 46 y.o.. The patient was seen today on 1/27/2022 at [facility].  She has significant daytime excessive sleepiness.  She lives in [place] which is closer to [place].    She had seen Dr. Vidales in 2018 and she underwent a sleep study which did not show any evidence of sleep apnea.  She had mild snoring.  At that time it was recommended that she undergo MSLT to see whether she has narcolepsy.  Unfortunately it was not done and her symptoms continue to worsen.    She works at [employer] is a  mainly works in the office but feels very very sleepy.  She has 3 children.  She says that she falls asleep even in traffic lights while driving and she has to struggle to be awake on a daily basis.  Her Baldwin Sleepiness Scale is 24.    Patient gives the following sleep history.  Sleep schedule:  Bedtime: Between 9 and 10 PM  Wake time: Between 7 and 8 AM  Normally takes about 30 minutes to fall asleep  Average hours of sleep 8  Number of naps per day 1-2  Symptoms  In addition to snoring, nonrestorative sleep and witnessed apneas patient gives the following associated symptoms.  Have you ever awakened gasping for breath, coughing, choking: No   Change in weight,  No   Morning headaches  No   Awaken with a sore throat or dry mouth  Yes   Leg jerking at night:  Yes   Crawly feeling/urge sensation to move in the legs: No   Teeth grinding:No   Have you ever awakened at night with a sour taste or burning sensation in your  "chest:  No   Do you have muscle weakness with laughing or anger or sleep paralysis:  No   Have you ever felt paralyzed while going to sleep or waking up:  No   Sleepwalking, nightmares, No   Nocturia (urination at night): 0 times per night  Memory Problem:No     MEDICAL CONDITIONS (PMH)  1. Hypertension  2. History of breast cancer  3. Migraine  4. Hemochromatosis carrier    Social history:  Do you drive a commercial vehicle:  No   Shift work:  No   Tobacco use:  No   Alcohol use: 0 per week  Caffeinated drinks: 3    Family Hx (your parents and siblings)  1. No history of sleep apnea  2. No history of narcolepsy  3. Hypertension    Medications: reviewed    Review of systems:  Sleep: Positve for snoring,witnessed apnea and daytime excessive sleepiness with Garland Sleepiness Scale of Total score: 24   Kidney/ Bladder  Difficulty In Urination: negative  Bed Wetting: negative  Frequent Urination: negative  HEENT  Recurrent Nose Bleeds: negative  Ear pain: negative  Sores In Mouth: negative  Persistent Hoarseness: negative  Nasal Congestion: negative  Post Nasal Drip: negative  Musculoskeletal:  Neck Pain: negative  Temporomandibular Joint Pain: negative  General:  Fever: negative  Fatigue: positive  Cardiovascular:  Irregular Heartbeat: negative  Swollen Ankles Or Legs: negative  Respiratory:  Shortness Of Breath: negative  Wheezing: negative  Neuro/Paych:  Fainting Spells: negative  Dizziness: negative  Anxiety: negative  Depression: negative  Gastrointestinal:  Problem Swallowing: negative  Frequent Heartburn: negative  Abdominal Bloating: negative  Skin:  Rash: negative  Change In Nails: negative  Endocrine:  Excessive Thirst: negative  Always Too Cold: negative  Always Too Warm: negative  Hem/Lymphatic:  Swollen Glands: negative  Burses/ Bleeds Easily: negative    Physical exam:  CONSTITUTINONAL:  Vitals:    01/27/22 1141   BP: 136/43   Pulse: 79   SpO2: 99%   Weight: 75.3 kg (166 lb)   Height: 154.9 cm (61\")    " Body mass index is 31.37 kg/m².   HEAD: atraumatic, normocephalic   EYES:pupils are round, equal and reacting to light and accommodation, conjunctiva normal  NOSE:no nasal septal defects, nasal passages are clear, no nasal polyps,   THROAT: tonsils are not enlarged, tongue normal size, oral airway Mallampati class 2  NECK:Neck Circumference: 13.5 inches, trachea is in the midline, thyroid not enlarged  RESPIRATORY SYSTEM: Breath sounds are normal, there are no wheezes  CARDIOVASULAR SYSTEM: Heart sounds are regular rhythm and normal rate, there are no murmurs or thrills, no edema  GASTROINTESTINAL: Soft and non-tender,liver not enlarged, no evidence of ascites  MUSCULOSKELETAL SYSTEM: Full range of motion's in all the 4 extremities, neck movement not restricted, temporomandibular joint movement normal and no tenderness  SKIN: Warm and moist, no cyanosis, no clubbing,  NEUROLOGICAL SYSTEM: Oriented x 3, no gross neurological defects, No speech defect, gait is normal  PSYCHIATRIC SYSTEM: Mood is normal, thought content is normal    Labs reviewed.  TSH Results:  TSH    TSH 3/9/21 6/28/21 12/8/21   TSH 0.841 0.998 0.585                 Assessment and plan:  · Narcolepsy without cataplexy , the daytime sleepiness is most suggestive of narcolepsy (G 47.419).  I have talked to the patient about narcolepsy and daytime excessive sleepiness with hypersomnia.  I have also given the education material.  Daytime excessive sleepiness was assessed with Sycamore Sleepiness Scale Total score: 24.  There are many causes for daytime excessive sleepiness including sleep depression, shiftwork syndrome, depression and other medical disorders including heart, kidney and liver failure.  The most serious cause of hypersomnia is due to neurological conditions like narcolepsy.  Patient will undergo full night polysomnography to rule out sleep apnea.  If the polysomnography is negative for sleep apnea then, will proceed with multiple sleep  latency test for a diagnosis of idiopathic hypersomnia or narcolepsy.  The test has been scheduled and also will undergo urine drug screen as part of the protocol for the MSLT to rule out any substance induced hypersomnia.  The order has been placed in the epic for the test and she will see me after the test is done. The test to be done at [facility].  Patient demetri from [place]  · Daytime excessive sleepiness .  It was assessed with Petersburg Sleepiness Scale of Total score: 24.  There are many causes for daytime excessive sleepiness including sleep depression, shiftwork syndrome, depression and other medical disorders including heart, kidney and liver failure.  The most serious cause of excessive sleepiness is due to neurological conditions like narcolepsy/cataplexy.  But the most common cause of excessive sleepiness is due to sleep apnea with frequent awakenings during sleep time.  I have discussed safety of driving and to remain vigilant while driving.  · Obesity class 1, patient's BMI is Body mass index is 31.37 kg/m².. I have discussed the relationship between weight and sleep apnea.There is direct correlation between weight and severity of sleep apnea.  Weight reduction is encouraged, as it is going to reduce the severity of sleep apnea. I have also discussed with the patient diet and exercise to achieve ideal body weight.  · Chronic fatigue    I have also discussed with the patient the following  • Sleep hygiene: Maintaining a regular bedtime and wake time, not to watch television or work in bed, limit caffeine-containing beverages before bed time and avoid naps during the day  • Adequate amount of sleep.  Generally most people needs about 7 to 8 hours of sleep.    Return for 31 to 90 days after PAP setup with down load..  Patient's questions were answered      I once again thank you for asking me to see this patient in consultation and I have forwarded my opinion and treatment plan.  Please do not  hesitate to call me if you have any questions.     Yohannes Coello MD  Sleep Medicine  Medical Director, [facility]  1/27/2022 ,

## 2022-02-02 ENCOUNTER — OFFICE VISIT (OUTPATIENT)
Dept: INTERNAL MEDICINE | Facility: CLINIC | Age: 47
End: 2022-02-02

## 2022-02-02 VITALS
WEIGHT: 166 LBS | BODY MASS INDEX: 31.34 KG/M2 | TEMPERATURE: 97.7 F | HEART RATE: 97 BPM | HEIGHT: 61 IN | OXYGEN SATURATION: 100 % | RESPIRATION RATE: 12 BRPM

## 2022-02-02 DIAGNOSIS — R68.83 CHILLS (WITHOUT FEVER): ICD-10-CM

## 2022-02-02 DIAGNOSIS — R05.9 COUGH: Primary | ICD-10-CM

## 2022-02-02 DIAGNOSIS — R09.89 CHEST CONGESTION: ICD-10-CM

## 2022-02-02 DIAGNOSIS — U07.1 COVID-19: ICD-10-CM

## 2022-02-02 PROCEDURE — 99214 OFFICE O/P EST MOD 30 MIN: CPT | Performed by: NURSE PRACTITIONER

## 2022-02-02 RX ORDER — METHYLPREDNISOLONE 4 MG/1
TABLET ORAL
Qty: 21 TABLET | Refills: 0 | Status: SHIPPED | OUTPATIENT
Start: 2022-02-02 | End: 2022-05-11

## 2022-02-02 RX ORDER — DEXTROMETHORPHAN HYDROBROMIDE AND PROMETHAZINE HYDROCHLORIDE 15; 6.25 MG/5ML; MG/5ML
5 SYRUP ORAL 4 TIMES DAILY PRN
Qty: 240 ML | Refills: 0 | Status: SHIPPED | OUTPATIENT
Start: 2022-02-02 | End: 2022-05-11

## 2022-02-02 NOTE — PROGRESS NOTES
"Chief Complaint  Nasal Congestion (Tested pos for covid on [id number]), Fatigue, Chills, Back Pain, and Chest Pain    Subjective         Keerthi Pillai presents to [facility] for Follow-up after having Covid and flu.    Patient was seen here on 1/13/2022, she was swabbed for flu and Covid, she did test positive for both. She was prescribed medications for symptoms at that time. She states that she was doing a little bit better a few days ago but then started doing worse. She does report significant congestion, still with significant fatigue, chills, backaches, chest tightness, and cough. She denies any other significant symptoms at this time. She was concerned for possible pneumonia. No one else in there house is been ill. Patient has been quarantined since then.         Review of Systems   Constitutional: Positive for chills and fatigue. Negative for fever.   HENT: Positive for congestion, rhinorrhea and sinus pressure. Negative for ear pain and sore throat.    Respiratory: Positive for cough and chest tightness. Negative for shortness of breath and wheezing.    Cardiovascular: Negative for chest pain and palpitations.   Gastrointestinal: Negative for diarrhea and nausea.   Musculoskeletal: Positive for back pain.   Skin: Negative for rash.   Neurological: Negative for headaches.       Objective   Vital Signs:   Pulse 97   Temp 97.7 °F (36.5 °C) (Temporal)   Resp 12   Ht 154.9 cm (61\")   Wt 75.3 kg (166 lb)   SpO2 100%   BMI 31.37 kg/m²     Physical Exam  Vitals and nursing note reviewed.   Constitutional:       Appearance: Normal appearance. She is normal weight.   HENT:      Head: Normocephalic and atraumatic.      Right Ear: Tympanic membrane, ear canal and external ear normal.      Left Ear: Tympanic membrane, ear canal and external ear normal.      Nose: Nose normal.      Mouth/Throat:      Mouth: Mucous membranes are moist.      Pharynx: Oropharynx is clear.   Eyes:      " Conjunctiva/sclera: Conjunctivae normal.      Pupils: Pupils are equal, round, and reactive to light.   Cardiovascular:      Rate and Rhythm: Normal rate and regular rhythm.   Pulmonary:      Effort: Pulmonary effort is normal.      Breath sounds: Normal breath sounds.   Neurological:      Mental Status: She is alert.   Psychiatric:         Mood and Affect: Mood normal.         Thought Content: Thought content normal.        Result Review :            XR Chest PA & Lateral    Result Date: 2/2/2022  Narrative: PROCEDURE: XR CHEST PA AND LATERAL  COMPARISON: [facility], , CHEST AP/PA 1 VIEW, 10/15/2019, 5:03.  INDICATIONS: chest tightness, cough, SOA, post COVID/FLU  FINDINGS:  Heart size is within normal limits.  No dense consolidation.  No visible pleural fluid or pneumothorax.      Impression:  No active process     JAN TOM MD       Electronically Signed and Approved By: JAN TOM MD on 2/02/2022 at 10:43                    Diagnoses and all orders for this visit:    1. Cough (Primary)  -     XR Chest PA & Lateral    2. COVID-19  Assessment & Plan:  Physical exam today is reassuring, patient does appear ill however her breathing and lung sounds are good.  Chest x-ray confirmed this, there is no active pneumonia at this time.  Patient advised that symptoms are lingering, she did have flu and Covid simultaneously.  This may result in symptoms extending beyond the normal timeline.  I will send in Promethazine DM, stop the Bromfed as it is not working for her.  I will send in a Medrol Dosepak as well.  Advised to follow-up if symptoms do not improve or worsen.      3. Chest congestion    4. Chills (without fever)    Other orders  -     promethazine-dextromethorphan (PROMETHAZINE-DM) 6.25-15 MG/5ML syrup; Take 5 mL by mouth 4 (Four) Times a Day As Needed for Cough.  Dispense: 240 mL; Refill: 0  -     methylPREDNISolone (MEDROL) 4 MG dose pack; Take as directed on package instructions.  Dispense:  21 tablet; Refill: 0        Follow Up   Return if symptoms worsen or fail to improve.  Patient was given instructions and counseling regarding her condition or for health maintenance advice. Please see specific information pulled into the AVS if appropriate.     Jasper Beavers, ANATOLIY  2/2/2022  This note was electronically signed.

## 2022-02-02 NOTE — ASSESSMENT & PLAN NOTE
Physical exam today is reassuring, patient does appear ill however her breathing and lung sounds are good.  Chest x-ray confirmed this, there is no active pneumonia at this time.  Patient advised that symptoms are lingering, she did have flu and Covid simultaneously.  This may result in symptoms extending beyond the normal timeline.  I will send in Promethazine DM, stop the Bromfed as it is not working for her.  I will send in a Medrol Dosepak as well.  Advised to follow-up if symptoms do not improve or worsen.

## 2022-02-09 ENCOUNTER — OFFICE VISIT (OUTPATIENT)
Dept: INTERNAL MEDICINE | Facility: CLINIC | Age: 47
End: 2022-02-09

## 2022-02-09 VITALS
BODY MASS INDEX: 31.34 KG/M2 | DIASTOLIC BLOOD PRESSURE: 72 MMHG | SYSTOLIC BLOOD PRESSURE: 124 MMHG | HEIGHT: 61 IN | WEIGHT: 166 LBS | HEART RATE: 89 BPM | RESPIRATION RATE: 18 BRPM | OXYGEN SATURATION: 98 % | TEMPERATURE: 97.1 F

## 2022-02-09 DIAGNOSIS — Z14.8 HEMOCHROMATOSIS CARRIER: ICD-10-CM

## 2022-02-09 DIAGNOSIS — F33.0 MAJOR DEPRESSIVE DISORDER, RECURRENT, MILD: ICD-10-CM

## 2022-02-09 DIAGNOSIS — C50.211 MALIGNANT NEOPLASM OF UPPER-INNER QUADRANT OF RIGHT BREAST IN FEMALE, ESTROGEN RECEPTOR NEGATIVE: ICD-10-CM

## 2022-02-09 DIAGNOSIS — H66.003 NON-RECURRENT ACUTE SUPPURATIVE OTITIS MEDIA OF BOTH EARS WITHOUT SPONTANEOUS RUPTURE OF TYMPANIC MEMBRANES: Primary | ICD-10-CM

## 2022-02-09 DIAGNOSIS — U07.1 COVID-19 VIRUS DETECTED: ICD-10-CM

## 2022-02-09 DIAGNOSIS — Z17.1 MALIGNANT NEOPLASM OF UPPER-INNER QUADRANT OF RIGHT BREAST IN FEMALE, ESTROGEN RECEPTOR NEGATIVE: ICD-10-CM

## 2022-02-09 PROCEDURE — 99214 OFFICE O/P EST MOD 30 MIN: CPT | Performed by: INTERNAL MEDICINE

## 2022-02-09 RX ORDER — DESVENLAFAXINE 25 MG/1
25 TABLET, EXTENDED RELEASE ORAL DAILY
Qty: 90 TABLET | Refills: 1 | Status: SHIPPED | OUTPATIENT
Start: 2022-02-09 | End: 2022-05-11 | Stop reason: SDUPTHER

## 2022-02-09 RX ORDER — AMOXICILLIN 500 MG/1
1000 CAPSULE ORAL 2 TIMES DAILY
Qty: 40 CAPSULE | Refills: 0 | Status: SHIPPED | OUTPATIENT
Start: 2022-02-09 | End: 2022-02-19

## 2022-02-09 NOTE — PROGRESS NOTES
"Chief Complaint  Cough (COVID/FLU + 1/13/22), Hoarse, Fatigue, and Nasal Congestion    Subjective          Keerthi Pillai presents to [facility]  History of Present Illness     States that she started feeling bad about a month  She has had significant congestion and cough  Now she is significantly hoarse  No fevers  No muscle aches  Can take deep breaths, but makes her want to cough  +sputum  Ears are very full  Has taste and smell    Anxiety/depression-  Really liking the pristiq  She is able to stay calm  No side effects of which she has noticed    Has a sleep study scheduled for Feb 22nd    States that she thinks that the steroids are giving her energy    Reviewed recent blood work from December that showed that she is doin nargis,   hasnt had blood draw recently because of her illnesss, but planning to get it soon    Objective   Vital Signs:   /72 (BP Location: Left arm, Patient Position: Sitting, Cuff Size: Adult)   Pulse 89   Temp 97.1 °F (36.2 °C)   Resp 18   Ht 154.9 cm (61\")   Wt 75.3 kg (166 lb)   SpO2 98%   BMI 31.37 kg/m²     Physical Exam  Vitals reviewed.   Constitutional:       Appearance: Normal appearance. She is well-developed.   HENT:      Head: Normocephalic and atraumatic.      Right Ear: External ear normal.      Left Ear: External ear normal.      Ears:      Comments: Bilateral TM's with significant pus bilaterall right with some erythema     Mouth/Throat:      Comments: Hoarse voice  Eyes:      Conjunctiva/sclera: Conjunctivae normal.      Pupils: Pupils are equal, round, and reactive to light.   Cardiovascular:      Rate and Rhythm: Normal rate and regular rhythm.      Heart sounds: No murmur heard.  No friction rub. No gallop.    Pulmonary:      Effort: Pulmonary effort is normal.      Breath sounds: Normal breath sounds. No wheezing or rhonchi.   Skin:     General: Skin is warm and dry.   Neurological:      Mental Status: She " is alert and oriented to person, place, and time.      Cranial Nerves: No cranial nerve deficit.   Psychiatric:         Mood and Affect: Affect normal.         Behavior: Behavior normal.         Thought Content: Thought content normal.        Result Review :       Common labs    Common Labsle 7/27/21 12/8/21 12/8/21 12/8/21 12/9/21     1427 1427 1427    Glucose    104 (A)    BUN    16    Creatinine    0.61    eGFR Non African Am    106    Sodium    138    Potassium    4.0    Chloride    102    Calcium    9.3    Albumin    4.10    Total Bilirubin    0.4    Alkaline Phosphatase    66    AST (SGOT)    18    ALT (SGPT)    20    WBC 5.96 7.57   6.59   Hemoglobin 12.0 14.1   12.5   Hematocrit 34.3 40.2   37.5   Platelets 233 276   264   Total Cholesterol   173     Triglycerides   158 (A)     HDL Cholesterol   39 (A)     LDL Cholesterol    106 (A)     (A) Abnormal value              Results for orders placed or performed in visit on 01/13/22   POCT SARS-CoV-2 Antigen JOSEPHINE    Specimen: Swab   Result Value Ref Range    SARS Antigen Detected (A) Not Detected    Internal Control Passed Passed    Lot Number [id number]     Expiration Date 3,202,023    POC Influenza A / B    Specimen: Swab   Result Value Ref Range    Rapid Influenza A Ag Positive (A) Negative    Rapid Influenza B Ag Negative Negative    Internal Control Passed Passed    Lot Number [id number]     Expiration Date 5,052,022             Procedures        Assessment and Plan    Diagnoses and all orders for this visit:    1. Non-recurrent acute suppurative otitis media of both ears without spontaneous rupture of tympanic membranes (Primary)  Comments:  will treat with amoxil    2. COVID-19 virus detected    3. Malignant neoplasm of upper-inner quadrant of right breast in female, estrogen receptor negative (HCC)  Comments:  doing well, cont to follow with Dr. Perez    4. Hemochromatosis carrier  Comments:  will get blood donation once feeling better    5. Major depressive  disorder, recurrent, mild (HCC)  Comments:  doing great on pristiq cont fo rnow    Other orders  -     Desvenlafaxine Succinate ER (Pristiq) 25 MG tablet sustained-release 24 hour; Take 1 tablet by mouth Daily.  Dispense: 90 tablet; Refill: 1  -     amoxicillin (AMOXIL) 500 MG capsule; Take 2 capsules by mouth 2 (Two) Times a Day for 10 days.  Dispense: 40 capsule; Refill: 0                Follow Up   Return in about 3 months (around 5/9/2022).  Patient was given instructions and counseling regarding her condition or for health maintenance advice. Please see specific information pulled into the AVS if appropriate.

## 2022-02-22 ENCOUNTER — HOSPITAL ENCOUNTER (OUTPATIENT)
Dept: SLEEP MEDICINE | Facility: HOSPITAL | Age: 47
Discharge: HOME | End: 2022-02-22
Admitting: INTERNAL MEDICINE

## 2022-02-22 DIAGNOSIS — G47.419 NARCOLEPSY WITHOUT CATAPLEXY: ICD-10-CM

## 2022-02-22 DIAGNOSIS — R40.0 HAS DAYTIME DROWSINESS: ICD-10-CM

## 2022-02-22 DIAGNOSIS — R53.82 CHRONIC FATIGUE: ICD-10-CM

## 2022-02-22 PROCEDURE — 95810 POLYSOM 6/> YRS 4/> PARAM: CPT

## 2022-02-22 PROCEDURE — 95810 POLYSOM 6/> YRS 4/> PARAM: CPT | Performed by: INTERNAL MEDICINE

## 2022-02-28 DIAGNOSIS — R06.83 SNORING: ICD-10-CM

## 2022-02-28 DIAGNOSIS — G47.33 OSA (OBSTRUCTIVE SLEEP APNEA): Primary | ICD-10-CM

## 2022-03-02 ENCOUNTER — TELEPHONE (OUTPATIENT)
Dept: SLEEP MEDICINE | Facility: HOSPITAL | Age: 47
End: 2022-03-02

## 2022-03-03 ENCOUNTER — PATIENT MESSAGE (OUTPATIENT)
Dept: INTERNAL MEDICINE | Facility: CLINIC | Age: 47
End: 2022-03-03

## 2022-03-03 RX ORDER — CLINDAMYCIN HYDROCHLORIDE 300 MG/1
300 CAPSULE ORAL 3 TIMES DAILY
Qty: 30 CAPSULE | Refills: 0 | Status: SHIPPED | OUTPATIENT
Start: 2022-03-03 | End: 2022-03-13

## 2022-03-04 NOTE — TELEPHONE ENCOUNTER
From: Keerthi Pillai  To: Jennifer Ocasio MD  Sent: 3/3/2022 8:09 AM EST  Subject: Sinus/Ear infection    Morning!  After finishing the Amoxicillin, I thought I might have been a bit better, but now all of the drainage is back and my ears are still clogged. Can we try the Augmentin?

## 2022-03-19 RX ORDER — AZITHROMYCIN 250 MG/1
TABLET, FILM COATED ORAL
Qty: 6 TABLET | Refills: 0 | Status: SHIPPED | OUTPATIENT
Start: 2022-03-19 | End: 2022-05-11

## 2022-04-01 RX ORDER — LISINOPRIL 20 MG/1
TABLET ORAL
Qty: 90 TABLET | Refills: 3 | Status: SHIPPED | OUTPATIENT
Start: 2022-04-01

## 2022-04-01 RX ORDER — ESTRADIOL AND NORETHINDRONE ACETATE 1; .5 MG/1; MG/1
TABLET ORAL
Qty: 84 TABLET | Refills: 3 | Status: SHIPPED | OUTPATIENT
Start: 2022-04-01 | End: 2022-05-11 | Stop reason: SDUPTHER

## 2022-05-11 ENCOUNTER — OFFICE VISIT (OUTPATIENT)
Dept: INTERNAL MEDICINE | Facility: CLINIC | Age: 47
End: 2022-05-11

## 2022-05-11 VITALS
DIASTOLIC BLOOD PRESSURE: 62 MMHG | HEIGHT: 61 IN | SYSTOLIC BLOOD PRESSURE: 116 MMHG | TEMPERATURE: 97.3 F | WEIGHT: 158.8 LBS | HEART RATE: 73 BPM | OXYGEN SATURATION: 99 % | BODY MASS INDEX: 29.98 KG/M2

## 2022-05-11 DIAGNOSIS — Z98.82 S/P BREAST IMPLANT, SILICONE: ICD-10-CM

## 2022-05-11 DIAGNOSIS — I10 PRIMARY HYPERTENSION: ICD-10-CM

## 2022-05-11 DIAGNOSIS — Z14.8 HEMOCHROMATOSIS CARRIER: ICD-10-CM

## 2022-05-11 DIAGNOSIS — F33.0 MAJOR DEPRESSIVE DISORDER, RECURRENT, MILD: Primary | ICD-10-CM

## 2022-05-11 DIAGNOSIS — Z17.1 MALIGNANT NEOPLASM OF UPPER-INNER QUADRANT OF RIGHT BREAST IN FEMALE, ESTROGEN RECEPTOR NEGATIVE: ICD-10-CM

## 2022-05-11 DIAGNOSIS — C50.211 MALIGNANT NEOPLASM OF UPPER-INNER QUADRANT OF RIGHT BREAST IN FEMALE, ESTROGEN RECEPTOR NEGATIVE: ICD-10-CM

## 2022-05-11 DIAGNOSIS — G47.33 OSA ON CPAP: ICD-10-CM

## 2022-05-11 DIAGNOSIS — E55.9 VITAMIN D DEFICIENCY: ICD-10-CM

## 2022-05-11 DIAGNOSIS — Z99.89 OSA ON CPAP: ICD-10-CM

## 2022-05-11 LAB
25(OH)D3 SERPL-MCNC: 99.4 NG/ML (ref 30–100)
ALBUMIN SERPL-MCNC: 4 G/DL (ref 3.5–5.2)
ALBUMIN/GLOB SERPL: 1.3 G/DL
ALP SERPL-CCNC: 58 U/L (ref 39–117)
ALT SERPL W P-5'-P-CCNC: 18 U/L (ref 1–33)
ANION GAP SERPL CALCULATED.3IONS-SCNC: 9.4 MMOL/L (ref 5–15)
AST SERPL-CCNC: 19 U/L (ref 1–32)
BASOPHILS # BLD AUTO: 0.09 10*3/MM3 (ref 0–0.2)
BASOPHILS NFR BLD AUTO: 1.8 % (ref 0–1.5)
BILIRUB SERPL-MCNC: 0.3 MG/DL (ref 0–1.2)
BUN SERPL-MCNC: 21 MG/DL (ref 6–20)
BUN/CREAT SERPL: 28 (ref 7–25)
CALCIUM SPEC-SCNC: 9.3 MG/DL (ref 8.6–10.5)
CHLORIDE SERPL-SCNC: 101 MMOL/L (ref 98–107)
CHOLEST SERPL-MCNC: 155 MG/DL (ref 0–200)
CO2 SERPL-SCNC: 25.6 MMOL/L (ref 22–29)
CREAT SERPL-MCNC: 0.75 MG/DL (ref 0.57–1)
DEPRECATED RDW RBC AUTO: 41.4 FL (ref 37–54)
EGFRCR SERPLBLD CKD-EPI 2021: 99.6 ML/MIN/1.73
EOSINOPHIL # BLD AUTO: 0.17 10*3/MM3 (ref 0–0.4)
EOSINOPHIL NFR BLD AUTO: 3.3 % (ref 0.3–6.2)
ERYTHROCYTE [DISTWIDTH] IN BLOOD BY AUTOMATED COUNT: 11.6 % (ref 12.3–15.4)
FERRITIN SERPL-MCNC: 824 NG/ML (ref 13–150)
GLOBULIN UR ELPH-MCNC: 3.1 GM/DL
GLUCOSE SERPL-MCNC: 99 MG/DL (ref 65–99)
HCT VFR BLD AUTO: 36.6 % (ref 34–46.6)
HDLC SERPL-MCNC: 33 MG/DL (ref 40–60)
HGB BLD-MCNC: 12.5 G/DL (ref 12–15.9)
IMM GRANULOCYTES # BLD AUTO: 0.01 10*3/MM3 (ref 0–0.05)
IMM GRANULOCYTES NFR BLD AUTO: 0.2 % (ref 0–0.5)
LDLC SERPL CALC-MCNC: 90 MG/DL (ref 0–100)
LDLC/HDLC SERPL: 2.58 {RATIO}
LYMPHOCYTES # BLD AUTO: 1.45 10*3/MM3 (ref 0.7–3.1)
LYMPHOCYTES NFR BLD AUTO: 28.4 % (ref 19.6–45.3)
MCH RBC QN AUTO: 33.7 PG (ref 26.6–33)
MCHC RBC AUTO-ENTMCNC: 34.2 G/DL (ref 31.5–35.7)
MCV RBC AUTO: 98.7 FL (ref 79–97)
MONOCYTES # BLD AUTO: 0.58 10*3/MM3 (ref 0.1–0.9)
MONOCYTES NFR BLD AUTO: 11.4 % (ref 5–12)
NEUTROPHILS NFR BLD AUTO: 2.81 10*3/MM3 (ref 1.7–7)
NEUTROPHILS NFR BLD AUTO: 54.9 % (ref 42.7–76)
NRBC BLD AUTO-RTO: 0 /100 WBC (ref 0–0.2)
PLATELET # BLD AUTO: 219 10*3/MM3 (ref 140–450)
PMV BLD AUTO: 11.8 FL (ref 6–12)
POTASSIUM SERPL-SCNC: 4.2 MMOL/L (ref 3.5–5.2)
PROT SERPL-MCNC: 7.1 G/DL (ref 6–8.5)
RBC # BLD AUTO: 3.71 10*6/MM3 (ref 3.77–5.28)
SODIUM SERPL-SCNC: 136 MMOL/L (ref 136–145)
TRIGL SERPL-MCNC: 184 MG/DL (ref 0–150)
TSH SERPL DL<=0.05 MIU/L-ACNC: 1.09 UIU/ML (ref 0.27–4.2)
VLDLC SERPL-MCNC: 32 MG/DL (ref 5–40)
WBC NRBC COR # BLD: 5.11 10*3/MM3 (ref 3.4–10.8)

## 2022-05-11 PROCEDURE — 80061 LIPID PANEL: CPT | Performed by: INTERNAL MEDICINE

## 2022-05-11 PROCEDURE — 82306 VITAMIN D 25 HYDROXY: CPT | Performed by: INTERNAL MEDICINE

## 2022-05-11 PROCEDURE — 80050 GENERAL HEALTH PANEL: CPT | Performed by: INTERNAL MEDICINE

## 2022-05-11 PROCEDURE — 99214 OFFICE O/P EST MOD 30 MIN: CPT | Performed by: INTERNAL MEDICINE

## 2022-05-11 PROCEDURE — 82728 ASSAY OF FERRITIN: CPT | Performed by: INTERNAL MEDICINE

## 2022-05-11 RX ORDER — CELECOXIB 200 MG/1
200 CAPSULE ORAL DAILY
Qty: 90 CAPSULE | Refills: 1 | Status: SHIPPED | OUTPATIENT
Start: 2022-05-11 | End: 2022-09-27

## 2022-05-11 RX ORDER — TIZANIDINE 2 MG/1
2 TABLET ORAL NIGHTLY PRN
Qty: 90 TABLET | Refills: 1 | Status: SHIPPED | OUTPATIENT
Start: 2022-05-11 | End: 2023-01-11

## 2022-05-11 RX ORDER — DESVENLAFAXINE SUCCINATE 50 MG/1
50 TABLET, EXTENDED RELEASE ORAL DAILY
Qty: 90 TABLET | Refills: 1 | Status: SHIPPED | OUTPATIENT
Start: 2022-05-11 | End: 2022-12-05 | Stop reason: SDUPTHER

## 2022-05-11 RX ORDER — ESTRADIOL AND NORETHINDRONE ACETATE 1; .5 MG/1; MG/1
1 TABLET ORAL DAILY
Qty: 84 TABLET | Refills: 3 | Status: SHIPPED | OUTPATIENT
Start: 2022-05-11

## 2022-05-11 NOTE — PROGRESS NOTES
"Chief Complaint  Follow-up (Post COVID, )    Subjective          Keerthi Pillai presents to [facility]  History of Present Illness    The patient presents today due a history of ear infections, hormone replacement therapy, anxiety and depression.     Sleep Apnea  The patient started using the CPAP and has a dislike for the machine. She is not able to open her mouth when using the CPAP. The air from the CPAP machine is forced out of her mouth. The patient reports itchiness of the nose when using the CPAP machine. Her device covers only the nose and not her mouth. She reports improvement to her condition initially, but now she awakes with symptoms of fatigue.     Breast cancer   The patient has breast cancer and had chemotherapy with Dr. Perez. He has since relocated from his current office. The patient also no longer sees Dr. Parker the plastic surgeon due to relocation. The patient needs a referral to a plastic surgeon to possibly reduce size of her silicone breast implants due to spine pain. The patient is trying to lose weight and is concerned about the heaviness of the breast implants.     Depressive disorder  The patient reports taking Pristiq 25 mg with effectiveness. She reports having \"1 major meltdown\" due to mom stress.    Weight loss and joint pain  The patient has lost some weight by taking Optavia. She is tolerating the Optavia well with the exception of the meals. She trying to lose weight due to joint pain. She would like to go from moderate keto to a \"lazy\" ketogenic diet. The patient reports drinking more water when doing the \"lazy\" ketogenic diet.     Ear pain  The patient reports right ear pain.    Hypertension  The patient has an excellent blood pressure. She does take lisinopril for her symptoms. She denies having shortness of breath.     Medications   The patient discontinued use of the Z-Alfonzo. She does do phlebotomies every so often for " "hemochromatosis carrier status. She denies symptoms of dizziness while doing phlebotomies.     A review of systems was performed, and the pertinent positives are noted in the HPI.    Objective   Vital Signs:  /62 (BP Location: Left arm, Patient Position: Sitting, Cuff Size: Adult)   Pulse 73   Temp 97.3 °F (36.3 °C) (Temporal)   Ht 154.9 cm (61\")   Wt 72 kg (158 lb 12.8 oz)   SpO2 99%   BMI 30.00 kg/m²           Physical Exam  Vitals reviewed.   Constitutional:       Appearance: Normal appearance. She is well-developed.   HENT:      Head: Normocephalic and atraumatic.      Right Ear: External ear normal.      Left Ear: External ear normal.   Eyes:      Conjunctiva/sclera: Conjunctivae normal.      Pupils: Pupils are equal, round, and reactive to light.   Cardiovascular:      Rate and Rhythm: Normal rate and regular rhythm.      Heart sounds: No murmur heard.    No friction rub. No gallop.   Pulmonary:      Effort: Pulmonary effort is normal.      Breath sounds: Normal breath sounds. No wheezing or rhonchi.   Skin:     General: Skin is warm and dry.   Neurological:      Mental Status: She is alert and oriented to person, place, and time.   Psychiatric:         Mood and Affect: Affect normal.         Behavior: Behavior normal.         Thought Content: Thought content normal.        Result Review :                 Assessment and Plan    Diagnoses and all orders for this visit:    1. Major depressive disorder, recurrent, mild (HCC) (Primary)  Comments:  doing well cont current meds    2. JENNA on CPAP  Comments:  cont to work with company to adjust mask and settings  Orders:  -     Ambulatory Referral to Plastic Surgery    3. Malignant neoplasm of upper-inner quadrant of right breast in female, estrogen receptor negative (HCC)  Comments:  doing well will try to see where Dr. Perez is going and get her in with him  Orders:  -     Comprehensive Metabolic Panel  -     CBC & Differential  -     TSH    4. S/P " breast implant, silicone  Comments:  refer to plastics for further work up    5. Primary hypertension  Comments:  well controlled cont to montior  Orders:  -     Comprehensive Metabolic Panel  -     CBC & Differential  -     TSH  -     Lipid Panel  -     Ferritin; Future  -     Ferritin    6. Hemochromatosis carrier  Comments:  cont with blood donations as they are helping labs  Orders:  -     Ferritin; Future  -     Ferritin    7. Vitamin D deficiency  Comments:  stable, check labs and adjust as needed  Orders:  -     Vitamin D 25 Hydroxy    Other orders  -     celecoxib (CeleBREX) 200 MG capsule; Take 1 capsule by mouth Daily.  Dispense: 90 capsule; Refill: 1  -     estradiol-norethindrone (ACTIVELLA) 1-0.5 MG per tablet; Take 1 tablet by mouth Daily.  Dispense: 84 tablet; Refill: 3  -     tiZANidine (ZANAFLEX) 2 MG tablet; Take 1 tablet by mouth At Night As Needed for Muscle Spasms.  Dispense: 90 tablet; Refill: 1  -     desvenlafaxine (Pristiq) 50 MG 24 hr tablet; Take 1 tablet by mouth Daily.  Dispense: 90 tablet; Refill: 1      1. Breast cancer  - The patient had a history of breast cancer and had chemotherapy with Dr. Perez. He has since relocated from his current office. We will locate Dr. Perez in his new location to schedule an appointment and resume chemotherapy. The patient also no longer sees Dr. Parker the plastic surgeon due to relocation. She has requested a referral to a new plastic surgeon due to silicone implants weight and spine pain.     2. Estrogen replacement  - The patient has decided to continue to take the estrogen replacement medication despite the risks of using while having breast cancer.     3. Depressive disorder  - The patient reports symptoms of family stress. We will increase the Pristiq to 50 mg for her symptoms.    4. Hypertension  - The patient will continue taking lisinopril 20 mg for her symptoms. If the patient has symptoms of dizziness while losing weight, than we can  "decrease lisinopril to 10 mg.     5. Weight loss and joint pain  - The patient has lost some weight by taking Optavia. She is tolerating the Optavia well with the exception of the meals. She trying to lose weight due to joint pain. She would like to go from moderate chemo to a \"lazy\" ketogenic diet. The patient reports drinking more water when doing the \"lazy\" ketogenic diet. She will continue taking Optavia as the current treatment plan.    6. Sleep Apnea  - The patient will continue using the CPAP machine for her symptoms and may be referred to a somnologist in the future.     7. Basic blood work  - The patient will be referred to the lab for basic lab work including ferritin level test. The patient is a hemochromatosis carrier.             Follow Up   Return in about 6 months (around 11/11/2022).  Patient was given instructions and counseling regarding her condition or for health maintenance advice. Please see specific information pulled into the AVS if appropriate.   Transcribed from ambient dictation for Jennifer Ocasio MD by Francine GILL.  05/11/22   12:34 EDT    Patient verbalized consent to the visit recording.  I have personally performed the services described in this document as transcribed by the above individual, and it is both accurate and complete.  Jennifer Ocasio MD  5/12/2022  13:44 EDT      "

## 2022-05-11 NOTE — PROGRESS NOTES
"Chief Complaint  Follow-up (Post COVID, ) depression, anxiety    Subjective     {Problem List  Visit Diagnosis   Encounters  Notes  Medications  Labs  Result Review Imaging  Media :23}     Keerthi Pillai presents to [facility]  History of Present Illness     Objective   Vital Signs:   /62 (BP Location: Left arm, Patient Position: Sitting, Cuff Size: Adult)   Pulse 73   Temp 97.3 °F (36.3 °C) (Temporal)   Ht 154.9 cm (61\")   Wt 72 kg (158 lb 12.8 oz)   SpO2 99%   BMI 30.00 kg/m²     Physical Exam  Vitals reviewed.   Constitutional:       Appearance: Normal appearance. She is well-developed.   HENT:      Head: Normocephalic and atraumatic.      Right Ear: Tympanic membrane and external ear normal.      Left Ear: Tympanic membrane and external ear normal.   Eyes:      Conjunctiva/sclera: Conjunctivae normal.      Pupils: Pupils are equal, round, and reactive to light.   Cardiovascular:      Rate and Rhythm: Normal rate and regular rhythm.      Heart sounds: No murmur heard.    No friction rub. No gallop.   Pulmonary:      Effort: Pulmonary effort is normal.      Breath sounds: Normal breath sounds. No wheezing or rhonchi.   Skin:     General: Skin is warm and dry.   Neurological:      Mental Status: She is alert and oriented to person, place, and time.   Psychiatric:         Mood and Affect: Affect normal.         Behavior: Behavior normal.         Thought Content: Thought content normal.        Result Review :{Labs  Result Review  Imaging  Med Tab  Media  Procedures :23}   {The following data was reviewed by (Optional):42607}  {Ambulatory Labs (Optional):08772}  Common labs    Common Labsle 7/27/21 12/8/21 12/8/21 12/8/21 12/9/21     1427 1427 1427    Glucose    104 (A)    BUN    16    Creatinine    0.61    eGFR Non African Am    106    Sodium    138    Potassium    4.0    Chloride    102    Calcium    9.3    Albumin    4.10    Total " Bilirubin    0.4    Alkaline Phosphatase    66    AST (SGOT)    18    ALT (SGPT)    20    WBC 5.96 7.57   6.59   Hemoglobin 12.0 14.1   12.5   Hematocrit 34.3 40.2   37.5   Platelets 233 276   264   Total Cholesterol   173     Triglycerides   158 (A)     HDL Cholesterol   39 (A)     LDL Cholesterol    106 (A)     (A) Abnormal value            {Recent Imaging (Optional):45439}  Results for orders placed or performed in visit on 01/13/22   POCT SARS-CoV-2 Antigen JOSEPHINE    Specimen: Swab   Result Value Ref Range    SARS Antigen Detected (A) Not Detected    Internal Control Passed Passed    Lot Number [id number]     Expiration Date 3,202,023    POC Influenza A / B    Specimen: Swab   Result Value Ref Range    Rapid Influenza A Ag Positive (A) Negative    Rapid Influenza B Ag Negative Negative    Internal Control Passed Passed    Lot Number [id number]     Expiration Date 5,052,022        {Data reviewed (Optional):68254:::1}     Procedures        Assessment and Plan {CC Problem List  Visit Diagnosis   ROS  Review (Popup)  Health Maintenance  Quality  BestPractice  Medications  SmartSets  SnapShot Encounters  Media :23}   Diagnoses and all orders for this visit:    1. Major depressive disorder, recurrent, mild (HCC) (Primary)    2. JENNA on CPAP  -     Ambulatory Referral to Plastic Surgery    3. Malignant neoplasm of upper-inner quadrant of right breast in female, estrogen receptor negative (HCC)  -     Comprehensive Metabolic Panel  -     CBC & Differential  -     TSH    4. S/P breast implant, silicone    5. Primary hypertension  -     Comprehensive Metabolic Panel  -     CBC & Differential  -     TSH  -     Lipid Panel  -     Ferritin; Future    6. Hemochromatosis carrier  -     Ferritin; Future    7. Vitamin D deficiency  -     Vitamin D 25 Hydroxy    Other orders  -     celecoxib (CeleBREX) 200 MG capsule; Take 1 capsule by mouth Daily.  Dispense: 90 capsule; Refill: 1  -     estradiol-norethindrone (ACTIVELLA)  1-0.5 MG per tablet; Take 1 tablet by mouth Daily.  Dispense: 84 tablet; Refill: 3  -     tiZANidine (ZANAFLEX) 2 MG tablet; Take 1 tablet by mouth At Night As Needed for Muscle Spasms.  Dispense: 90 tablet; Refill: 1  -     desvenlafaxine (Pristiq) 50 MG 24 hr tablet; Take 1 tablet by mouth Daily.  Dispense: 90 tablet; Refill: 1          {CD Anticipatory Guidance (Optional):56478}    {Time Spent (Optional):80262}    Follow Up {Instructions Charge Capture  Follow-up Communications :23}  Return in about 6 months (around 11/11/2022).  Patient was given instructions and counseling regarding her condition or for health maintenance advice. Please see specific information pulled into the AVS if appropriate.

## 2022-07-13 ENCOUNTER — OFFICE VISIT (OUTPATIENT)
Dept: SLEEP MEDICINE | Facility: HOSPITAL | Age: 47
End: 2022-07-13

## 2022-07-13 VITALS
HEIGHT: 61 IN | SYSTOLIC BLOOD PRESSURE: 144 MMHG | HEART RATE: 59 BPM | WEIGHT: 158 LBS | BODY MASS INDEX: 29.83 KG/M2 | DIASTOLIC BLOOD PRESSURE: 72 MMHG | OXYGEN SATURATION: 98 %

## 2022-07-13 DIAGNOSIS — R53.82 CHRONIC FATIGUE: ICD-10-CM

## 2022-07-13 DIAGNOSIS — G47.10 HYPERSOMNIA WITH SLEEP APNEA: ICD-10-CM

## 2022-07-13 DIAGNOSIS — G47.30 HYPERSOMNIA WITH SLEEP APNEA: ICD-10-CM

## 2022-07-13 DIAGNOSIS — G47.33 OSA ON CPAP: Primary | ICD-10-CM

## 2022-07-13 DIAGNOSIS — Z99.89 OSA ON CPAP: Primary | ICD-10-CM

## 2022-07-13 PROCEDURE — 99214 OFFICE O/P EST MOD 30 MIN: CPT | Performed by: INTERNAL MEDICINE

## 2022-07-13 PROCEDURE — G0463 HOSPITAL OUTPT CLINIC VISIT: HCPCS | Performed by: INTERNAL MEDICINE

## 2022-07-13 RX ORDER — MODAFINIL 200 MG/1
200 TABLET ORAL DAILY
Qty: 30 TABLET | Refills: 5 | Status: SHIPPED | OUTPATIENT
Start: 2022-07-13 | End: 2022-11-14

## 2022-07-13 NOTE — PROGRESS NOTES
"  [address] [address]  Phone: [phone]  Fax: [phone]      SLEEP CLINIC FOLLOW UP PROGRESS NOTE.    Keerthi Pillai  [id number]   1975  47 y.o.  female      PCP: Jennifer Ocasio MD      Date of visit: 7/13/2022    Chief Complaint   Patient presents with   • Sleep Apnea   • Daytime Sleepiness       HPI:  This is a 47 y.o. years old patient is here for the management of obstructive sleep apnea.  Sleep apnea is moderate in severity with a AHI of 15/hr.Patient is using positive airway pressure therapy and the symptoms of snoring, non-restorative sleep and daytime excessive sleepiness have improved significantly on auto CPAP. Normally goes to bed at 10 PM and wakes up at 7 AM.  The patient wakes up 1-2 time(s) during the night and has no problem going back to sleep.  Feels refreshed after waking up.  Patient also denies headaches and nasal congestion.     Patient also has hypersomnia even though she is being treated with CPAP adequately on her Denver Sleepiness Scale is 24 out of 24.  She works from home.     Medications and allergies are reviewed by me and documented in the encounter.     SOCIAL ( habits pertaining to sleep medicine)  History of tobacco use:No   History of alcohol use: 1 per week  Caffeine use: 1    REVIEW OF SYSTEMS:   Denver Sleepiness Scale :Total score: 24   Nasal congestion:No   Dry mouth/nose:No   Post nasal drip; No   Acid reflux/Heartburn:No   Abd bloating:No   Morning headache:No   Anxiety:No   Depression:No       PHYSICAL EXAMINATION:  CONSTITUTIONAL:  Vitals:    07/13/22 1500   BP: 144/72   Pulse: 59   SpO2: 98%   Weight: 71.7 kg (158 lb)   Height: 154.9 cm (61\")    Body mass index is 29.85 kg/m².   NOSE: nasal passages are clear, No deformities noted   RESP SYSTEM: Not in any respiratory distress, no chest deformities noted,   CARDIOVASULAR: No edema noted  NEURO: Oriented x 3, gait normal,  Mood and affect " appeared appropriate      Data reviewed:  The Smart card downloaded on 7/13/2022 has been reviewed independently by me for compliance and discussed the data with the patient.   Compliance; 86%  More than 4 hr use, 86%  Average use of the device 7 hours and 33 minutes per night  Residual AHI: 1.5 /hr (goal < 5.0 /hr)  Mask type: Nasal mask  Device: Radha  DME: [employer]      Drug agreement for modafinil done    ASSESSMENT AND PLAN:  · Obstructive sleep apnea ( G 47.33).  The symptoms of sleep apnea have improved with the device and the treatment.  Patient's compliance with the device is excellent for treatment of sleep apnea.  I have independently reviewed the smart card down load and discussed with the patient the download data and encouarged the patient to continue to use the device.The residual AHI is acceptable. The device is benefiting the patient and the device is medically necessary.  Without proper control of sleep apnea and good compliance there is a increased risk for hypertension, diabetes mellitus and nonrestorative sleep with hypersomnia which can increase risk for motor vehicle accidents.  Untreated sleep apnea is also a risk factor for development of atrial fibrillation, pulmonary hypertension and stroke. The patient is also instructed to get the supplies from the [employer] and and change them on a regular basis.  A prescription for supplies has been sent to the [employer] company.  I have also discussed the good sleep hygiene habits and adequate amount of sleep needed for good health.  · Hypersomnia with sleep apnea, has sleep apnea is adequately treated.  She still has hypersomnia with Pryor Sleepiness Scale of 24 out of 24.  I am going to start her on modafinil 200 mg in the morning.  I have given information on the medication.  She is not currently have any more children.  · Return in about 6 months (around 1/13/2023) for Next scheduled follow-up. . Patient's questions were answered.      Yohannes SCHOFIELD  MD [place]  Sleep Medicine  Medical Director, [facility] [facility]  7/13/2022

## 2022-09-01 ENCOUNTER — TELEPHONE (OUTPATIENT)
Dept: UROLOGY | Facility: CLINIC | Age: 47
End: 2022-09-01

## 2022-09-07 ENCOUNTER — TELEPHONE (OUTPATIENT)
Dept: UROLOGY | Facility: CLINIC | Age: 47
End: 2022-09-07

## 2022-09-12 NOTE — TELEPHONE ENCOUNTER
3RD ATTEMPT LVM FOR PT TO CALL OFFICE BACK TO R/S NO SHOWED APPT FROM 9/7 WITH DR. ZARATE/MS ANYTHING ELSE TO DO?

## 2022-09-27 RX ORDER — CELECOXIB 200 MG/1
200 CAPSULE ORAL DAILY
Qty: 90 CAPSULE | Refills: 1 | Status: SHIPPED | OUTPATIENT
Start: 2022-09-27

## 2022-10-04 ENCOUNTER — TELEPHONE (OUTPATIENT)
Dept: INTERNAL MEDICINE | Facility: CLINIC | Age: 47
End: 2022-10-04

## 2022-10-04 PROCEDURE — 87147 CULTURE TYPE IMMUNOLOGIC: CPT

## 2022-10-04 PROCEDURE — 87086 URINE CULTURE/COLONY COUNT: CPT

## 2022-10-04 NOTE — TELEPHONE ENCOUNTER
Caller: Keerthi Pillai    Relationship to patient: Self    Best call back number: [phone]    Patient is needing: PATIENT STATES SHE HAS A UTI AND WOULD LIKE AN APPOINTMENT OR ANTIBIOTIC CALLED IN.     [id number] - [place], [place] - [address] [facility] - [phone]  - [phone]         PATIENT WAS TRANSFERRED TO OFFICE UNABLE TO GET AN APPOINTMENT SENT TO NURSE NO ANSWER AND CALLED BACK.

## 2022-11-11 ENCOUNTER — PRIOR AUTHORIZATION (OUTPATIENT)
Dept: INTERNAL MEDICINE | Facility: CLINIC | Age: 47
End: 2022-11-11

## 2022-11-14 ENCOUNTER — OFFICE VISIT (OUTPATIENT)
Dept: INTERNAL MEDICINE | Facility: CLINIC | Age: 47
End: 2022-11-14

## 2022-11-14 VITALS
TEMPERATURE: 98.3 F | WEIGHT: 167.4 LBS | HEIGHT: 61 IN | DIASTOLIC BLOOD PRESSURE: 80 MMHG | BODY MASS INDEX: 31.6 KG/M2 | HEART RATE: 85 BPM | SYSTOLIC BLOOD PRESSURE: 130 MMHG | OXYGEN SATURATION: 98 %

## 2022-11-14 DIAGNOSIS — R43.9 SMELL DISORDER: ICD-10-CM

## 2022-11-14 DIAGNOSIS — Z14.8 HEMOCHROMATOSIS CARRIER: ICD-10-CM

## 2022-11-14 DIAGNOSIS — I10 PRIMARY HYPERTENSION: ICD-10-CM

## 2022-11-14 DIAGNOSIS — Z17.1 MALIGNANT NEOPLASM OF UPPER-INNER QUADRANT OF RIGHT BREAST IN FEMALE, ESTROGEN RECEPTOR NEGATIVE: ICD-10-CM

## 2022-11-14 DIAGNOSIS — Z11.59 NEED FOR HEPATITIS C SCREENING TEST: ICD-10-CM

## 2022-11-14 DIAGNOSIS — E55.9 VITAMIN D DEFICIENCY: ICD-10-CM

## 2022-11-14 DIAGNOSIS — F33.0 MAJOR DEPRESSIVE DISORDER, RECURRENT, MILD: Primary | ICD-10-CM

## 2022-11-14 DIAGNOSIS — C50.211 MALIGNANT NEOPLASM OF UPPER-INNER QUADRANT OF RIGHT BREAST IN FEMALE, ESTROGEN RECEPTOR NEGATIVE: ICD-10-CM

## 2022-11-14 DIAGNOSIS — F51.01 PRIMARY INSOMNIA: ICD-10-CM

## 2022-11-14 DIAGNOSIS — R30.0 DYSURIA: ICD-10-CM

## 2022-11-14 LAB
ALBUMIN SERPL-MCNC: 4.4 G/DL (ref 3.5–5.2)
ALBUMIN/GLOB SERPL: 1.4 G/DL
ALP SERPL-CCNC: 74 U/L (ref 39–117)
ALT SERPL W P-5'-P-CCNC: 16 U/L (ref 1–33)
ANION GAP SERPL CALCULATED.3IONS-SCNC: 11.3 MMOL/L (ref 5–15)
AST SERPL-CCNC: 17 U/L (ref 1–32)
BILIRUB SERPL-MCNC: 0.3 MG/DL (ref 0–1.2)
BILIRUB UR QL STRIP: NEGATIVE
BUN SERPL-MCNC: 10 MG/DL (ref 6–20)
BUN/CREAT SERPL: 16.9 (ref 7–25)
CALCIUM SPEC-SCNC: 9.4 MG/DL (ref 8.6–10.5)
CANCER AG15-3 SERPL-ACNC: 28 U/ML
CHLORIDE SERPL-SCNC: 103 MMOL/L (ref 98–107)
CHOLEST SERPL-MCNC: 210 MG/DL (ref 0–200)
CLARITY UR: CLEAR
CO2 SERPL-SCNC: 27.7 MMOL/L (ref 22–29)
COLOR UR: YELLOW
CREAT SERPL-MCNC: 0.59 MG/DL (ref 0.57–1)
EGFRCR SERPLBLD CKD-EPI 2021: 112.7 ML/MIN/1.73
FERRITIN SERPL-MCNC: 909 NG/ML (ref 13–150)
GLOBULIN UR ELPH-MCNC: 3.2 GM/DL
GLUCOSE SERPL-MCNC: 107 MG/DL (ref 65–99)
GLUCOSE UR STRIP-MCNC: NEGATIVE MG/DL
HDLC SERPL-MCNC: 56 MG/DL (ref 40–60)
HGB UR QL STRIP.AUTO: NEGATIVE
KETONES UR QL STRIP: NEGATIVE
LDLC SERPL CALC-MCNC: 127 MG/DL (ref 0–100)
LDLC/HDLC SERPL: 2.21 {RATIO}
LEUKOCYTE ESTERASE UR QL STRIP.AUTO: NEGATIVE
NITRITE UR QL STRIP: NEGATIVE
PH UR STRIP.AUTO: 7 [PH] (ref 5–8)
POTASSIUM SERPL-SCNC: 3.7 MMOL/L (ref 3.5–5.2)
PROT SERPL-MCNC: 7.6 G/DL (ref 6–8.5)
PROT UR QL STRIP: NEGATIVE
SODIUM SERPL-SCNC: 142 MMOL/L (ref 136–145)
SP GR UR STRIP: 1.02 (ref 1–1.03)
TRIGL SERPL-MCNC: 151 MG/DL (ref 0–150)
TSH SERPL DL<=0.05 MIU/L-ACNC: 0.97 UIU/ML (ref 0.27–4.2)
UROBILINOGEN UR QL STRIP: NORMAL
VLDLC SERPL-MCNC: 27 MG/DL (ref 5–40)

## 2022-11-14 PROCEDURE — 86300 IMMUNOASSAY TUMOR CA 15-3: CPT | Performed by: INTERNAL MEDICINE

## 2022-11-14 PROCEDURE — 82728 ASSAY OF FERRITIN: CPT | Performed by: INTERNAL MEDICINE

## 2022-11-14 PROCEDURE — 87086 URINE CULTURE/COLONY COUNT: CPT | Performed by: INTERNAL MEDICINE

## 2022-11-14 PROCEDURE — 80050 GENERAL HEALTH PANEL: CPT | Performed by: INTERNAL MEDICINE

## 2022-11-14 PROCEDURE — 81003 URINALYSIS AUTO W/O SCOPE: CPT | Performed by: INTERNAL MEDICINE

## 2022-11-14 PROCEDURE — 99214 OFFICE O/P EST MOD 30 MIN: CPT | Performed by: INTERNAL MEDICINE

## 2022-11-14 PROCEDURE — 82378 CARCINOEMBRYONIC ANTIGEN: CPT | Performed by: INTERNAL MEDICINE

## 2022-11-14 PROCEDURE — 80061 LIPID PANEL: CPT | Performed by: INTERNAL MEDICINE

## 2022-11-14 PROCEDURE — 86803 HEPATITIS C AB TEST: CPT | Performed by: INTERNAL MEDICINE

## 2022-11-14 PROCEDURE — 82306 VITAMIN D 25 HYDROXY: CPT | Performed by: INTERNAL MEDICINE

## 2022-11-14 RX ORDER — ZOLPIDEM TARTRATE 5 MG/1
5 TABLET ORAL NIGHTLY PRN
Qty: 30 TABLET | Refills: 2 | Status: SHIPPED | OUTPATIENT
Start: 2022-11-14 | End: 2023-01-27

## 2022-11-14 RX ORDER — DESVENLAFAXINE 25 MG/1
25 TABLET, EXTENDED RELEASE ORAL DAILY
COMMUNITY
Start: 2022-10-12 | End: 2023-01-18

## 2022-11-14 NOTE — PROGRESS NOTES
"Chief Complaint  Follow-up (6 month ), Depression (Follow up), CPAP (Follow up ), Hypertension (Follow up), BREAST IMPLANT  (DID NOT TAKE INSURANCE ON ONE THAT WAS REFERRED TO, ANY OTHER DOCTORS? Follow up), Vitamin D Deficiency (Follow up), and hemacromatosis (Follow up)    Subjective          Keerthi Pillai presents to [facility]  History of Present Illness     Still would like to get back in with Armando Perez.  Still speaks with her heme/onc in [place].  Would like to get cancer blood work done.    Hasn't had a blood draw recently, but plans to    Doing well on stress meds, but has noticed that she has some more irritability    Has been having some more trouble sleepign recently    Objective   Vital Signs:   /80 (BP Location: Left arm, Patient Position: Sitting, Cuff Size: Adult)   Pulse 85   Temp 98.3 °F (36.8 °C)   Ht 154.9 cm (61\")   Wt 75.9 kg (167 lb 6.4 oz)   SpO2 98%   BMI 31.63 kg/m²     Physical Exam  Vitals reviewed.   Constitutional:       Appearance: Normal appearance. She is well-developed.   HENT:      Head: Normocephalic and atraumatic.      Right Ear: External ear normal.      Left Ear: External ear normal.   Eyes:      Conjunctiva/sclera: Conjunctivae normal.      Pupils: Pupils are equal, round, and reactive to light.   Cardiovascular:      Rate and Rhythm: Normal rate and regular rhythm.      Heart sounds: No murmur heard.    No friction rub. No gallop.   Pulmonary:      Effort: Pulmonary effort is normal.      Breath sounds: Normal breath sounds. No wheezing or rhonchi.   Skin:     General: Skin is warm and dry.   Neurological:      Mental Status: She is alert and oriented to person, place, and time.   Psychiatric:         Mood and Affect: Affect normal.         Behavior: Behavior normal.         Thought Content: Thought content normal.        Result Review :       Common labs    Common Labs 12/9/21 5/11/22 5/11/22 5/11/22 " 11/14/22 11/14/22 11/14/22     1032 1032 1032 1740 1740 1740   Glucose   99  107 (A)     BUN   21 (A)  10     Creatinine   0.75  0.59     Sodium   136  142     Potassium   4.2  3.7     Chloride   101  103     Calcium   9.3  9.4     Albumin   4.00  4.40     Total Bilirubin   0.3  0.3     Alkaline Phosphatase   58  74     AST (SGOT)   19  17     ALT (SGPT)   18  16     WBC 6.59 5.11     6.85   Hemoglobin 12.5 12.5     13.2   Hematocrit 37.5 36.6     39.3   Platelets 264 219     271   Total Cholesterol    155  210 (A)    Triglycerides    184 (A)  151 (A)    HDL Cholesterol    33 (A)  56    LDL Cholesterol     90  127 (A)    (A) Abnormal value              Results for orders placed or performed in visit on 11/14/22   Urine Culture - Urine, Urine, Clean Catch    Specimen: Urine, Clean Catch   Result Value Ref Range    Urine Culture No growth    Comprehensive Metabolic Panel    Specimen: Blood   Result Value Ref Range    Glucose 107 (H) 65 - 99 mg/dL    BUN 10 6 - 20 mg/dL    Creatinine 0.59 0.57 - 1.00 mg/dL    Sodium 142 136 - 145 mmol/L    Potassium 3.7 3.5 - 5.2 mmol/L    Chloride 103 98 - 107 mmol/L    CO2 27.7 22.0 - 29.0 mmol/L    Calcium 9.4 8.6 - 10.5 mg/dL    Total Protein 7.6 6.0 - 8.5 g/dL    Albumin 4.40 3.50 - 5.20 g/dL    ALT (SGPT) 16 1 - 33 U/L    AST (SGOT) 17 1 - 32 U/L    Alkaline Phosphatase 74 39 - 117 U/L    Total Bilirubin 0.3 0.0 - 1.2 mg/dL    Globulin 3.2 gm/dL    A/G Ratio 1.4 g/dL    BUN/Creatinine Ratio 16.9 7.0 - 25.0    Anion Gap 11.3 5.0 - 15.0 mmol/L    eGFR 112.7 >60.0 mL/min/1.73   TSH    Specimen: Blood   Result Value Ref Range    TSH 0.967 0.270 - 4.200 uIU/mL   Lipid Panel    Specimen: Blood   Result Value Ref Range    Total Cholesterol 210 (H) 0 - 200 mg/dL    Triglycerides 151 (H) 0 - 150 mg/dL    HDL Cholesterol 56 40 - 60 mg/dL    LDL Cholesterol  127 (H) 0 - 100 mg/dL    VLDL Cholesterol 27 5 - 40 mg/dL    LDL/HDL Ratio 2.21    Ferritin    Specimen: Blood   Result Value Ref  Range    Ferritin 909.00 (H) 13.00 - 150.00 ng/mL   Vitamin D,25-Hydroxy    Specimen: Blood   Result Value Ref Range    25 Hydroxy, Vitamin D 89.9 30.0 - 100.0 ng/ml   CEA    Specimen: Blood   Result Value Ref Range    CEA <0.60 ng/mL   Cancer Antigen 15-3    Specimen: Blood   Result Value Ref Range    CA 15-3 28.0 (H) <=25.0 U/mL   Hepatitis C Antibody    Specimen: Blood   Result Value Ref Range    Hepatitis C Ab Non-Reactive Non-Reactive   CBC Auto Differential    Specimen: Blood   Result Value Ref Range    WBC 6.85 3.40 - 10.80 10*3/mm3    RBC 3.98 3.77 - 5.28 10*6/mm3    Hemoglobin 13.2 12.0 - 15.9 g/dL    Hematocrit 39.3 34.0 - 46.6 %    MCV 98.7 (H) 79.0 - 97.0 fL    MCH 33.2 (H) 26.6 - 33.0 pg    MCHC 33.6 31.5 - 35.7 g/dL    RDW 11.4 (L) 12.3 - 15.4 %    RDW-SD 41.4 37.0 - 54.0 fl    MPV 11.1 6.0 - 12.0 fL    Platelets 271 140 - 450 10*3/mm3    Neutrophil % 61.7 42.7 - 76.0 %    Lymphocyte % 24.1 19.6 - 45.3 %    Monocyte % 9.6 5.0 - 12.0 %    Eosinophil % 2.5 0.3 - 6.2 %    Basophil % 1.5 0.0 - 1.5 %    Immature Grans % 0.6 (H) 0.0 - 0.5 %    Neutrophils, Absolute 4.23 1.70 - 7.00 10*3/mm3    Lymphocytes, Absolute 1.65 0.70 - 3.10 10*3/mm3    Monocytes, Absolute 0.66 0.10 - 0.90 10*3/mm3    Eosinophils, Absolute 0.17 0.00 - 0.40 10*3/mm3    Basophils, Absolute 0.10 0.00 - 0.20 10*3/mm3    Immature Grans, Absolute 0.04 0.00 - 0.05 10*3/mm3    nRBC 0.0 0.0 - 0.2 /100 WBC   Urinalysis With Culture If Indicated - Urine, Clean Catch    Specimen: Urine, Clean Catch   Result Value Ref Range    Color, UA Yellow Yellow, Straw    Appearance, UA Clear Clear    pH, UA 7.0 5.0 - 8.0    Specific Gravity, UA 1.025 1.005 - 1.030    Glucose, UA Negative Negative    Ketones, UA Negative Negative    Bilirubin, UA Negative Negative    Blood, UA Negative Negative    Protein, UA Negative Negative    Leuk Esterase, UA Negative Negative    Nitrite, UA Negative Negative    Urobilinogen, UA 0.2 E.U./dL 0.2 - 1.0 E.U./dL             Procedures        Assessment and Plan    Diagnoses and all orders for this visit:    1. Major depressive disorder, recurrent, mild (HCC) (Primary)  Comments:  stable cont current meds    2. Hemochromatosis carrier  Comments:  cont with blood donations  Orders:  -     Ferritin    3. Malignant neoplasm of upper-inner quadrant of right breast in female, estrogen receptor negative (HCC)  Comments:  will check blood work  Orders:  -     CEA; Future  -     Cancer Antigen 15-3; Future  -     CEA  -     Cancer Antigen 15-3    4. Smell disorder  Comments:  if continues could consider MRI brain to rule out mets    5. Primary insomnia  Comments:  will try ambien, warned of side effects  Orders:  -     zolpidem (Ambien) 5 MG tablet; Take 1 tablet by mouth At Night As Needed for Sleep.  Dispense: 30 tablet; Refill: 2    6. Dysuria  Comments:  ua  Orders:  -     Urinalysis With Culture If Indicated -; Future  -     Urinalysis With Culture If Indicated - Urine, Clean Catch  -     Urine Culture - Urine, Urine, Clean Catch    7. Vitamin D deficiency  -     Vitamin D,25-Hydroxy    8. Primary hypertension  Comments:  she will cont to monitor  Orders:  -     Comprehensive Metabolic Panel  -     CBC & Differential  -     TSH  -     Lipid Panel    9. Need for hepatitis C screening test  -     Hepatitis C Antibody; Future  -     Hepatitis C Antibody                  Follow Up   No follow-ups on file.  Patient was given instructions and counseling regarding her condition or for health maintenance advice. Please see specific information pulled into the AVS if appropriate.

## 2022-11-15 LAB
25(OH)D3 SERPL-MCNC: 89.9 NG/ML (ref 30–100)
BACTERIA SPEC AEROBE CULT: NO GROWTH
BASOPHILS # BLD AUTO: 0.1 10*3/MM3 (ref 0–0.2)
BASOPHILS NFR BLD AUTO: 1.5 % (ref 0–1.5)
CEA SERPL-MCNC: <0.6 NG/ML
DEPRECATED RDW RBC AUTO: 41.4 FL (ref 37–54)
EOSINOPHIL # BLD AUTO: 0.17 10*3/MM3 (ref 0–0.4)
EOSINOPHIL NFR BLD AUTO: 2.5 % (ref 0.3–6.2)
ERYTHROCYTE [DISTWIDTH] IN BLOOD BY AUTOMATED COUNT: 11.4 % (ref 12.3–15.4)
HCT VFR BLD AUTO: 39.3 % (ref 34–46.6)
HCV AB SER DONR QL: NORMAL
HGB BLD-MCNC: 13.2 G/DL (ref 12–15.9)
IMM GRANULOCYTES # BLD AUTO: 0.04 10*3/MM3 (ref 0–0.05)
IMM GRANULOCYTES NFR BLD AUTO: 0.6 % (ref 0–0.5)
LYMPHOCYTES # BLD AUTO: 1.65 10*3/MM3 (ref 0.7–3.1)
LYMPHOCYTES NFR BLD AUTO: 24.1 % (ref 19.6–45.3)
MCH RBC QN AUTO: 33.2 PG (ref 26.6–33)
MCHC RBC AUTO-ENTMCNC: 33.6 G/DL (ref 31.5–35.7)
MCV RBC AUTO: 98.7 FL (ref 79–97)
MONOCYTES # BLD AUTO: 0.66 10*3/MM3 (ref 0.1–0.9)
MONOCYTES NFR BLD AUTO: 9.6 % (ref 5–12)
NEUTROPHILS NFR BLD AUTO: 4.23 10*3/MM3 (ref 1.7–7)
NEUTROPHILS NFR BLD AUTO: 61.7 % (ref 42.7–76)
NRBC BLD AUTO-RTO: 0 /100 WBC (ref 0–0.2)
PLATELET # BLD AUTO: 271 10*3/MM3 (ref 140–450)
PMV BLD AUTO: 11.1 FL (ref 6–12)
RBC # BLD AUTO: 3.98 10*6/MM3 (ref 3.77–5.28)
WBC NRBC COR # BLD: 6.85 10*3/MM3 (ref 3.4–10.8)

## 2022-12-05 RX ORDER — DESVENLAFAXINE SUCCINATE 50 MG/1
50 TABLET, EXTENDED RELEASE ORAL DAILY
Qty: 90 TABLET | Refills: 1 | Status: SHIPPED | OUTPATIENT
Start: 2022-12-05

## 2022-12-05 NOTE — TELEPHONE ENCOUNTER
Caller: Keerthi Pillai    Relationship: Self    Best call back number: [phone]     Requested Prescriptions:   Requested Prescriptions     Pending Prescriptions Disp Refills   • desvenlafaxine (Pristiq) 50 MG 24 hr tablet 90 tablet 1     Sig: Take 1 tablet by mouth Daily.        Pharmacy where request should be sent: [facility] - [place], [place] - [address] [facility] [phone]  - [phone] FX     Additional details provided by patient: PATIENT IS NEEDING THE 50 MG.     Does the patient have less than a 3 day supply:  [x] Yes  [] No    Would you like a call back once the refill request has been completed: [x] Yes [] No    If the office needs to give you a call back, can they leave a voicemail: [x] Yes [] No    Martha Calvillo Rep   12/05/22 10:46 EST

## 2022-12-06 ENCOUNTER — TELEPHONE (OUTPATIENT)
Dept: INTERNAL MEDICINE | Facility: CLINIC | Age: 47
End: 2022-12-06

## 2022-12-06 DIAGNOSIS — R53.83 OTHER FATIGUE: ICD-10-CM

## 2022-12-06 DIAGNOSIS — C50.211 MALIGNANT NEOPLASM OF UPPER-INNER QUADRANT OF RIGHT BREAST IN FEMALE, ESTROGEN RECEPTOR NEGATIVE: ICD-10-CM

## 2022-12-06 DIAGNOSIS — Z17.1 MALIGNANT NEOPLASM OF UPPER-INNER QUADRANT OF RIGHT BREAST IN FEMALE, ESTROGEN RECEPTOR NEGATIVE: ICD-10-CM

## 2022-12-06 DIAGNOSIS — R51.9 NONINTRACTABLE HEADACHE, UNSPECIFIED CHRONICITY PATTERN, UNSPECIFIED HEADACHE TYPE: Primary | ICD-10-CM

## 2022-12-22 ENCOUNTER — HOSPITAL ENCOUNTER (OUTPATIENT)
Dept: MRI IMAGING | Facility: HOSPITAL | Age: 47
Discharge: HOME | End: 2022-12-22
Admitting: INTERNAL MEDICINE

## 2022-12-22 DIAGNOSIS — C50.211 MALIGNANT NEOPLASM OF UPPER-INNER QUADRANT OF RIGHT BREAST IN FEMALE, ESTROGEN RECEPTOR NEGATIVE: ICD-10-CM

## 2022-12-22 DIAGNOSIS — R51.9 NONINTRACTABLE HEADACHE, UNSPECIFIED CHRONICITY PATTERN, UNSPECIFIED HEADACHE TYPE: ICD-10-CM

## 2022-12-22 DIAGNOSIS — R53.83 OTHER FATIGUE: ICD-10-CM

## 2022-12-22 DIAGNOSIS — Z17.1 MALIGNANT NEOPLASM OF UPPER-INNER QUADRANT OF RIGHT BREAST IN FEMALE, ESTROGEN RECEPTOR NEGATIVE: ICD-10-CM

## 2022-12-22 LAB
CREAT BLDA-MCNC: 0.7 MG/DL
EGFRCR SERPLBLD CKD-EPI 2021: 108.2 ML/MIN/1.73

## 2022-12-22 PROCEDURE — 82565 ASSAY OF CREATININE: CPT

## 2022-12-22 PROCEDURE — 70553 MRI BRAIN STEM W/O & W/DYE: CPT

## 2022-12-22 PROCEDURE — 0 GADOBENATE DIMEGLUMINE 529 MG/ML SOLUTION: Performed by: INTERNAL MEDICINE

## 2022-12-22 PROCEDURE — A9577 INJ MULTIHANCE: HCPCS | Performed by: INTERNAL MEDICINE

## 2022-12-22 RX ADMIN — GADOBENATE DIMEGLUMINE 20 ML: 529 INJECTION, SOLUTION INTRAVENOUS at 13:32

## 2023-01-11 RX ORDER — TIZANIDINE 2 MG/1
2 TABLET ORAL NIGHTLY PRN
Qty: 90 TABLET | Refills: 1 | Status: SHIPPED | OUTPATIENT
Start: 2023-01-11

## 2023-01-18 ENCOUNTER — OFFICE VISIT (OUTPATIENT)
Dept: SLEEP MEDICINE | Facility: HOSPITAL | Age: 48
End: 2023-01-18
Payer: COMMERCIAL

## 2023-01-18 VITALS
OXYGEN SATURATION: 99 % | HEART RATE: 60 BPM | DIASTOLIC BLOOD PRESSURE: 75 MMHG | HEIGHT: 61 IN | SYSTOLIC BLOOD PRESSURE: 137 MMHG | WEIGHT: 164.2 LBS | BODY MASS INDEX: 31 KG/M2

## 2023-01-18 DIAGNOSIS — Z99.89 OSA ON CPAP: Primary | ICD-10-CM

## 2023-01-18 DIAGNOSIS — E66.9 CLASS 1 OBESITY: ICD-10-CM

## 2023-01-18 DIAGNOSIS — G47.33 OSA ON CPAP: Primary | ICD-10-CM

## 2023-01-18 PROBLEM — E66.811 CLASS 1 OBESITY: Status: ACTIVE | Noted: 2023-01-18

## 2023-01-18 PROCEDURE — 99213 OFFICE O/P EST LOW 20 MIN: CPT | Performed by: INTERNAL MEDICINE

## 2023-01-18 PROCEDURE — G0463 HOSPITAL OUTPT CLINIC VISIT: HCPCS

## 2023-01-18 RX ORDER — HYALURONATE SOD, CROSS-LINKED 30 MG/3 ML
SYRINGE (ML) INTRAARTICULAR
COMMUNITY
Start: 2022-12-14

## 2023-01-18 RX ORDER — MODAFINIL 200 MG/1
200 TABLET ORAL DAILY
COMMUNITY
Start: 2022-11-17 | End: 2023-01-18 | Stop reason: ALTCHOICE

## 2023-01-18 NOTE — PROGRESS NOTES
"  [address] [address]  [place]   [place] [address]  Phone: [phone]  Fax: [phone]      SLEEP CLINIC FOLLOW UP PROGRESS NOTE.    Keerthi Pillai  [id number]   1975  47 y.o.  female      PCP: Jennifer Ocasio MD      Date of visit: 1/18/2023    Chief Complaint   Patient presents with   • Sleep Apnea   • Obesity       HPI:  This is a 47 y.o. years old patient is here for the management of obstructive sleep apnea.  Sleep apnea is moderate in severity with a AHI of 15/hr. Patient is using positive airway pressure therapy with auto CPAP but unfortunately she is not able to use these CPAP because she has a Radha to which has no heated tubing.  Patient reports that there is too much water even though she has done down the humidity to the lowest but when she uses she feels lot better.      Medications and allergies are reviewed by me and documented in the encounter.     SOCIAL (habits pertaining to sleep medicine)  • History tobacco use:No   • History of alcohol use: 1 per week  • Caffeine use: 1     REVIEW OF SYSTEMS:   Pertaining positive symptoms are:  • [place] Sleepiness Scale :Total score: 14   • Nasal congestion      PHYSICAL EXAMINATION:  CONSTITUTIONAL:  Vitals:    01/18/23 1500   BP: 137/75   Pulse: 60   SpO2: 99%   Weight: 74.5 kg (164 lb 3.2 oz)   Height: 154.9 cm (60.98\")    Body mass index is 31.04 kg/m².   NOSE: nasal passages are clear, No deformities noted   RESP SYSTEM: Not in any respiratory distress, no chest deformities noted,   CARDIOVASULAR: No edema noted  NEURO: Oriented x 3, gait normal,  Mood and affect appeared appropriate      Data reviewed:  The Smart card downloaded on 1/18/2023 has been reviewed independently by me for compliance and discussed the data with the patient.   Compliance; 20%  More than 4 hr use, 20%  Average use of the device 1 hour and 44 per night  Residual AHI: 1.5 /hr (goal < 5.0 /hr)  Mask type: Fullface   Device: Radha  DME: " [facility]      ASSESSMENT AND PLAN:  · Obstructive sleep apnea ( G 47.33).  Unfortunately she is not able to use the CPAP because of water condensation.  Communicated with [facility] [facility] and they are going to exchange the Radha CPAP to ResMed with a heated tubing.  She will come back to see me in 3 months for follow-up. .Without proper control of sleep apnea and good compliance there is a increased risk for hypertension, diabetes mellitus and nonrestorative sleep with hypersomnia which can increase risk for motor vehicle accidents.  Untreated sleep apnea is also a risk factor for development of atrial fibrillation, pulmonary hypertension, insulin resistance and stroke.   · Obesity  1 with BMI is Body mass index is 31.04 kg/m².. I have discuss the relationship between the weight and sleep apnea. The benefit of weight loss in reducing severity of sleep apnea was discussed. Discussed diet and exercise with the patient to achieve ideal BMI.  · Return in about 3 months (around 4/18/2023) for Recheck with smart card down load. . Patient's questions were answered.    1/18/2023  Yohannes Coello MD  Sleep Medicine.  Medical Director,   [facility] [place] [facility] [place] [facility].

## 2023-01-27 ENCOUNTER — OFFICE VISIT (OUTPATIENT)
Dept: INTERNAL MEDICINE | Facility: CLINIC | Age: 48
End: 2023-01-27
Payer: COMMERCIAL

## 2023-01-27 VITALS
TEMPERATURE: 98 F | DIASTOLIC BLOOD PRESSURE: 90 MMHG | HEART RATE: 98 BPM | HEIGHT: 61 IN | OXYGEN SATURATION: 99 % | SYSTOLIC BLOOD PRESSURE: 134 MMHG | BODY MASS INDEX: 31.49 KG/M2 | WEIGHT: 166.8 LBS

## 2023-01-27 DIAGNOSIS — G47.33 OSA ON CPAP: ICD-10-CM

## 2023-01-27 DIAGNOSIS — F51.01 PRIMARY INSOMNIA: ICD-10-CM

## 2023-01-27 DIAGNOSIS — Z99.89 OSA ON CPAP: ICD-10-CM

## 2023-01-27 DIAGNOSIS — Z17.1 MALIGNANT NEOPLASM OF UPPER-INNER QUADRANT OF RIGHT BREAST IN FEMALE, ESTROGEN RECEPTOR NEGATIVE: ICD-10-CM

## 2023-01-27 DIAGNOSIS — J01.10 ACUTE NON-RECURRENT FRONTAL SINUSITIS: ICD-10-CM

## 2023-01-27 DIAGNOSIS — F33.0 MAJOR DEPRESSIVE DISORDER, RECURRENT, MILD: Primary | ICD-10-CM

## 2023-01-27 DIAGNOSIS — C50.211 MALIGNANT NEOPLASM OF UPPER-INNER QUADRANT OF RIGHT BREAST IN FEMALE, ESTROGEN RECEPTOR NEGATIVE: ICD-10-CM

## 2023-01-27 DIAGNOSIS — Z14.8 HEMOCHROMATOSIS CARRIER: ICD-10-CM

## 2023-01-27 DIAGNOSIS — Z78.0 POST-MENOPAUSAL: ICD-10-CM

## 2023-01-27 PROCEDURE — 99214 OFFICE O/P EST MOD 30 MIN: CPT | Performed by: INTERNAL MEDICINE

## 2023-01-27 PROCEDURE — 90471 IMMUNIZATION ADMIN: CPT | Performed by: INTERNAL MEDICINE

## 2023-01-27 PROCEDURE — 90715 TDAP VACCINE 7 YRS/> IM: CPT | Performed by: INTERNAL MEDICINE

## 2023-01-27 RX ORDER — AMOXICILLIN 875 MG/1
875 TABLET, COATED ORAL 2 TIMES DAILY
Qty: 20 TABLET | Refills: 0 | Status: SHIPPED | OUTPATIENT
Start: 2023-01-27 | End: 2023-02-06

## 2023-01-27 RX ORDER — TRAZODONE HYDROCHLORIDE 50 MG/1
50 TABLET ORAL NIGHTLY
Qty: 90 TABLET | Refills: 1 | Status: SHIPPED | OUTPATIENT
Start: 2023-01-27

## 2023-03-21 PROCEDURE — 87086 URINE CULTURE/COLONY COUNT: CPT | Performed by: FAMILY MEDICINE

## 2023-04-26 RX ORDER — LISINOPRIL 20 MG/1
TABLET ORAL
Qty: 90 TABLET | Refills: 3 | Status: SHIPPED | OUTPATIENT
Start: 2023-04-26

## 2023-05-04 ENCOUNTER — OFFICE VISIT (OUTPATIENT)
Dept: PLASTIC SURGERY | Facility: CLINIC | Age: 48
End: 2023-05-04
Payer: COMMERCIAL

## 2023-05-04 ENCOUNTER — PREP FOR SURGERY (OUTPATIENT)
Dept: OTHER | Facility: HOSPITAL | Age: 48
End: 2023-05-04
Payer: COMMERCIAL

## 2023-05-04 VITALS
OXYGEN SATURATION: 95 % | HEART RATE: 62 BPM | HEIGHT: 61 IN | BODY MASS INDEX: 31.57 KG/M2 | SYSTOLIC BLOOD PRESSURE: 137 MMHG | DIASTOLIC BLOOD PRESSURE: 82 MMHG | TEMPERATURE: 98.7 F | WEIGHT: 167.2 LBS

## 2023-05-04 DIAGNOSIS — Z17.1 MALIGNANT NEOPLASM OF UPPER-INNER QUADRANT OF RIGHT BREAST IN FEMALE, ESTROGEN RECEPTOR NEGATIVE: Primary | ICD-10-CM

## 2023-05-04 DIAGNOSIS — N64.89 BREAST ASYMMETRY: ICD-10-CM

## 2023-05-04 DIAGNOSIS — N65.0 DEFORMITY OF RECONSTRUCTED BREAST: Primary | ICD-10-CM

## 2023-05-04 DIAGNOSIS — N65.0 DEFORMITY OF RECONSTRUCTED BREAST: ICD-10-CM

## 2023-05-04 DIAGNOSIS — C50.211 MALIGNANT NEOPLASM OF UPPER-INNER QUADRANT OF RIGHT BREAST IN FEMALE, ESTROGEN RECEPTOR NEGATIVE: Primary | ICD-10-CM

## 2023-05-04 RX ORDER — CEFAZOLIN SODIUM 2 G/100ML
2 INJECTION, SOLUTION INTRAVENOUS ONCE
OUTPATIENT
Start: 2023-05-04 | End: 2023-05-04

## 2023-05-04 NOTE — PROGRESS NOTES
"Consult (Bilateral breast implant exchange)            History of Present Illness  Keerthi Pillai is a 48 y.o. female who presents to [facility] as a self referral  to discuss breast reconstructive options.  She wears D bra size and would like to be small C.    Breast Recon initially 3/31/2016 under the muscle by Riya Roberts MD in [place].  Was going to have an exchange but could not get approved through insurance.  Went to Dr. Morales in [place] and had fat grafting, moved implants to over the muscle.  Did a second round of fat grafting.  Patient then moved to [place] and Dr. Parker did additional bilateral fat grafting to breast.      Patient would like to have implants removed and replaced with smaller implants and fill out the lymph node areas.  She is concerned about a contour irregularity with a hollowed area just lateral to the upper border of the pectoralis, left worse than right.    Works from home, desk job.  1 week off work.    Subjective      Augmentin [amoxicillin-pot clavulanate]  Allergies Reconciled.    Review of Systems  All system were reviewed and were negative, except the ones noted above.     @Objective     /82 (BP Location: Left arm, Patient Position: Sitting)   Pulse 62   Temp 98.7 °F (37.1 °C) (Temporal)   Ht 154.9 cm (60.98\")   Wt 75.8 kg (167 lb 3.2 oz)   SpO2 95%   BMI 31.61 kg/m²     Body mass index is 31.61 kg/m².    Physical Exam  Physical exam:  Patient awake, alert, oriented  Respirations are non elaborated  Patient is not tachycardic    Breast exam:     Bilateral breast with about a 1.5 cm pinch to the mastectomy flaps bilaterally.  Some questionable thinning of the mastectomy incision just lateral to the reconstructed nipple on the right side.  No significant palpability of the implant.   no palpable masses or tenderness   Axillary Lymphadenopathy: No axillary lymphadenopathy  SN-N (Right Breast):   SN-N (Left " Breast):  N-IMF (Right Breast):  N-IMF (Left Breast):  Base Width (Right Breast):15  Base Width (Left Breast):  The patient has a prominent axillary roll on the left axilla with an associated soft tissue deficit superior to this, just lateral to the lateral border the pectoralis muscle.  The right axilla does not have his prominent axillary roll and has a mild soft tissue deficit on the superior aspect of the lateral border of the pectoralis.  Patient has mild to moderate abdominal lipodystrophy.      Result Review :              Assessment and Plan      Diagnoses and all orders for this visit:    1. Malignant neoplasm of upper-inner quadrant of right breast in female, estrogen receptor negative (Primary)    2. Breast asymmetry    3. Deformity of reconstructed breast        Additional Order(s):       Plan:  RECON Bilateral implant exchange, Bilateral breast fat grafting and left axilla fat grafting, Left breast skin tailoring  3 hours    Discussion with patient regarding various options.  The left axilla contour irregularity is exacerbated by the more prominent excess of skin of the axillary roll.  Excision of the axillary roll with fat grafting versus the epithelization of the axillary roll and transposition into the soft tissue defect superior to it was discussed with the patient.  It is felt that transposition of the de-epithelialized flap from the left axillary roll into the soft tissue deficit will provide larger amount of tissue to fill in this contour regularity.  Fat grafting into the right axilla.  Fat grafting into the bilateral mastectomy flaps to increase flap thickness.  The patient thinks she has 800 cc silicone implants in place.  She is satisfied with the silicone implant, but would like to be a smaller size.  Recommend a 200 to 300 cc reduction in silicone implant size.  Consider bringing different size implants from 400 to 600 cc.  And then the final decision can be made based on the size of the  removed implant.  In the meanwhile, the patient will continue to look for her implant card.  She understands this will not address all of her concerns regarding her breast, but it will improve these very focused and specific concerns.  The risks and benefits of the procedure were discussed with the patient.  The risk described included, but not limited to, bleeding, infection, need for revision surgeries, seroma, hematoma, poor cosmetic result, poor functional result, edge necrosis, edge separation, donor site contour irregularity to the abdomen, oil cyst, flap necrosis, deep venous thrombosis, pulmonary embolus, pneumonia, heart attack, stroke, death.  Her questions were answered. She still agrees to the procedure.          Follow Up     Return Return to clinic for preoperative visit.    Patient was given instructions and counseling regarding her condition. Please see specific information pulled into the AVS if appropriate.     Butch Marks MD  05/04/2023

## 2023-05-11 PROBLEM — N65.0 DEFORMITY OF RECONSTRUCTED BREAST: Status: ACTIVE | Noted: 2023-05-11

## 2023-05-12 ENCOUNTER — PATIENT MESSAGE (OUTPATIENT)
Dept: PLASTIC SURGERY | Facility: CLINIC | Age: 48
End: 2023-05-12
Payer: COMMERCIAL

## 2023-05-12 ENCOUNTER — PATIENT ROUNDING (BHMG ONLY) (OUTPATIENT)
Dept: PLASTIC SURGERY | Facility: CLINIC | Age: 48
End: 2023-05-12
Payer: COMMERCIAL

## 2023-05-12 NOTE — PROGRESS NOTES
[employer] message has been sent to the patient for PATIENT ROUNDING with [facility].

## 2023-05-16 RX ORDER — CELECOXIB 200 MG/1
CAPSULE ORAL
Qty: 90 CAPSULE | Refills: 1 | Status: SHIPPED | OUTPATIENT
Start: 2023-05-16

## 2023-05-31 ENCOUNTER — OFFICE VISIT (OUTPATIENT)
Dept: INTERNAL MEDICINE | Facility: CLINIC | Age: 48
End: 2023-05-31
Payer: COMMERCIAL

## 2023-05-31 VITALS
BODY MASS INDEX: 32.08 KG/M2 | TEMPERATURE: 97.3 F | SYSTOLIC BLOOD PRESSURE: 124 MMHG | DIASTOLIC BLOOD PRESSURE: 86 MMHG | HEIGHT: 60 IN | OXYGEN SATURATION: 95 % | HEART RATE: 67 BPM | WEIGHT: 163.4 LBS

## 2023-05-31 DIAGNOSIS — E03.9 HYPOTHYROIDISM, UNSPECIFIED TYPE: ICD-10-CM

## 2023-05-31 DIAGNOSIS — R53.82 CHRONIC FATIGUE: ICD-10-CM

## 2023-05-31 DIAGNOSIS — Z14.8 HEMOCHROMATOSIS CARRIER: ICD-10-CM

## 2023-05-31 DIAGNOSIS — R53.83 OTHER FATIGUE: ICD-10-CM

## 2023-05-31 DIAGNOSIS — E66.9 CLASS 1 OBESITY: ICD-10-CM

## 2023-05-31 DIAGNOSIS — E55.9 VITAMIN D DEFICIENCY: ICD-10-CM

## 2023-05-31 DIAGNOSIS — F33.0 MAJOR DEPRESSIVE DISORDER, RECURRENT, MILD: Primary | ICD-10-CM

## 2023-05-31 LAB
25(OH)D3 SERPL-MCNC: 63.1 NG/ML (ref 30–100)
ALBUMIN SERPL-MCNC: 4.1 G/DL (ref 3.5–5.2)
ALBUMIN/GLOB SERPL: 1.3 G/DL
ALP SERPL-CCNC: 60 U/L (ref 39–117)
ALT SERPL W P-5'-P-CCNC: 20 U/L (ref 1–33)
ANION GAP SERPL CALCULATED.3IONS-SCNC: 9.4 MMOL/L (ref 5–15)
AST SERPL-CCNC: 20 U/L (ref 1–32)
BASOPHILS # BLD AUTO: 0.09 10*3/MM3 (ref 0–0.2)
BASOPHILS NFR BLD AUTO: 1.5 % (ref 0–1.5)
BILIRUB SERPL-MCNC: 0.3 MG/DL (ref 0–1.2)
BUN SERPL-MCNC: 14 MG/DL (ref 6–20)
BUN/CREAT SERPL: 20.9 (ref 7–25)
CALCIUM SPEC-SCNC: 9.3 MG/DL (ref 8.6–10.5)
CHLORIDE SERPL-SCNC: 103 MMOL/L (ref 98–107)
CHOLEST SERPL-MCNC: 172 MG/DL (ref 0–200)
CO2 SERPL-SCNC: 26.6 MMOL/L (ref 22–29)
CREAT SERPL-MCNC: 0.67 MG/DL (ref 0.57–1)
DEPRECATED RDW RBC AUTO: 40.3 FL (ref 37–54)
EGFRCR SERPLBLD CKD-EPI 2021: 108.6 ML/MIN/1.73
EOSINOPHIL # BLD AUTO: 0.19 10*3/MM3 (ref 0–0.4)
EOSINOPHIL NFR BLD AUTO: 3.2 % (ref 0.3–6.2)
ERYTHROCYTE [DISTWIDTH] IN BLOOD BY AUTOMATED COUNT: 11.4 % (ref 12.3–15.4)
FOLATE SERPL-MCNC: 7.72 NG/ML (ref 4.78–24.2)
GLOBULIN UR ELPH-MCNC: 3.1 GM/DL
GLUCOSE SERPL-MCNC: 107 MG/DL (ref 65–99)
HCT VFR BLD AUTO: 36.3 % (ref 34–46.6)
HDLC SERPL-MCNC: 41 MG/DL (ref 40–60)
HGB BLD-MCNC: 12.6 G/DL (ref 12–15.9)
IMM GRANULOCYTES # BLD AUTO: 0.01 10*3/MM3 (ref 0–0.05)
IMM GRANULOCYTES NFR BLD AUTO: 0.2 % (ref 0–0.5)
IRON 24H UR-MRATE: 226 MCG/DL (ref 37–145)
IRON SATN MFR SERPL: 82 % (ref 20–50)
LDLC SERPL CALC-MCNC: 103 MG/DL (ref 0–100)
LDLC/HDLC SERPL: 2.4 {RATIO}
LYMPHOCYTES # BLD AUTO: 1.53 10*3/MM3 (ref 0.7–3.1)
LYMPHOCYTES NFR BLD AUTO: 25.5 % (ref 19.6–45.3)
MCH RBC QN AUTO: 33.7 PG (ref 26.6–33)
MCHC RBC AUTO-ENTMCNC: 34.7 G/DL (ref 31.5–35.7)
MCV RBC AUTO: 97.1 FL (ref 79–97)
MONOCYTES # BLD AUTO: 0.49 10*3/MM3 (ref 0.1–0.9)
MONOCYTES NFR BLD AUTO: 8.2 % (ref 5–12)
NEUTROPHILS NFR BLD AUTO: 3.7 10*3/MM3 (ref 1.7–7)
NEUTROPHILS NFR BLD AUTO: 61.4 % (ref 42.7–76)
NRBC BLD AUTO-RTO: 0 /100 WBC (ref 0–0.2)
PLATELET # BLD AUTO: 246 10*3/MM3 (ref 140–450)
PMV BLD AUTO: 11.3 FL (ref 6–12)
POTASSIUM SERPL-SCNC: 3.5 MMOL/L (ref 3.5–5.2)
PROT SERPL-MCNC: 7.2 G/DL (ref 6–8.5)
RBC # BLD AUTO: 3.74 10*6/MM3 (ref 3.77–5.28)
SODIUM SERPL-SCNC: 139 MMOL/L (ref 136–145)
T3FREE SERPL-MCNC: 3.35 PG/ML (ref 2–4.4)
T4 FREE SERPL-MCNC: 1.27 NG/DL (ref 0.93–1.7)
TIBC SERPL-MCNC: 274 MCG/DL (ref 298–536)
TRANSFERRIN SERPL-MCNC: 184 MG/DL (ref 200–360)
TRIGL SERPL-MCNC: 162 MG/DL (ref 0–150)
TSH SERPL DL<=0.05 MIU/L-ACNC: 0.63 UIU/ML (ref 0.27–4.2)
VIT B12 BLD-MCNC: 773 PG/ML (ref 211–946)
VLDLC SERPL-MCNC: 28 MG/DL (ref 5–40)
WBC NRBC COR # BLD: 6.01 10*3/MM3 (ref 3.4–10.8)

## 2023-05-31 PROCEDURE — 82746 ASSAY OF FOLIC ACID SERUM: CPT | Performed by: INTERNAL MEDICINE

## 2023-05-31 PROCEDURE — 84481 FREE ASSAY (FT-3): CPT | Performed by: INTERNAL MEDICINE

## 2023-05-31 PROCEDURE — 84439 ASSAY OF FREE THYROXINE: CPT | Performed by: INTERNAL MEDICINE

## 2023-05-31 PROCEDURE — 82607 VITAMIN B-12: CPT | Performed by: INTERNAL MEDICINE

## 2023-05-31 PROCEDURE — 80050 GENERAL HEALTH PANEL: CPT | Performed by: INTERNAL MEDICINE

## 2023-05-31 PROCEDURE — 84466 ASSAY OF TRANSFERRIN: CPT | Performed by: INTERNAL MEDICINE

## 2023-05-31 PROCEDURE — 80061 LIPID PANEL: CPT | Performed by: INTERNAL MEDICINE

## 2023-05-31 PROCEDURE — 83540 ASSAY OF IRON: CPT | Performed by: INTERNAL MEDICINE

## 2023-05-31 PROCEDURE — 82306 VITAMIN D 25 HYDROXY: CPT | Performed by: INTERNAL MEDICINE

## 2023-05-31 RX ORDER — FLUTICASONE PROPIONATE 50 MCG
2 SPRAY, SUSPENSION (ML) NASAL DAILY
Qty: 11.1 ML | Refills: 1 | Status: SHIPPED | OUTPATIENT
Start: 2023-05-31

## 2023-05-31 RX ORDER — FEXOFENADINE HCL 180 MG/1
180 TABLET ORAL DAILY
Qty: 90 TABLET | Refills: 1 | Status: SHIPPED | OUTPATIENT
Start: 2023-05-31

## 2023-05-31 NOTE — PROGRESS NOTES
"Chief Complaint  Follow-up (4 month f/u) for anxiety    Subjective          Keerthi Pillai presents to [facility]  History of Present Illness     Feeling well  Overall liking her medicine for stress relief, feels like it helps    Still liking her hormone therapy  Continues to work with heme/onc  Things are stable currently    She is considering having her implants fixed  She has seen Dr. Marks for this (she had initially worked with Dr. Parker)    Has recently noticed some hair loss as well as fatigue    She is getting her routine phelbotomies regularly    Objective   Vital Signs:   /86 (BP Location: Left arm, Patient Position: Sitting, Cuff Size: Adult)   Pulse 67   Temp 97.3 °F (36.3 °C) (Temporal)   Ht 152.4 cm (60\")   Wt 74.1 kg (163 lb 6.4 oz)   SpO2 95%   BMI 31.91 kg/m²     Physical Exam  Vitals reviewed.   Constitutional:       Appearance: Normal appearance. She is well-developed.   HENT:      Head: Normocephalic and atraumatic.      Right Ear: External ear normal.      Left Ear: External ear normal.   Eyes:      Conjunctiva/sclera: Conjunctivae normal.      Pupils: Pupils are equal, round, and reactive to light.   Cardiovascular:      Rate and Rhythm: Normal rate and regular rhythm.      Heart sounds: No murmur heard.    No friction rub. No gallop.   Pulmonary:      Effort: Pulmonary effort is normal.      Breath sounds: Normal breath sounds. No wheezing or rhonchi.   Skin:     General: Skin is warm and dry.   Neurological:      Mental Status: She is alert and oriented to person, place, and time.   Psychiatric:         Mood and Affect: Affect normal.         Behavior: Behavior normal.         Thought Content: Thought content normal.      Result Review :       Common labs          11/14/2022    17:40 12/22/2022    13:18 5/31/2023    17:43   Common Labs   Glucose 107   107    BUN 10   14    Creatinine 0.59  0.70  0.67    Sodium 142   139  "   Potassium 3.7   3.5    Chloride 103   103    Calcium 9.4   9.3    Albumin 4.40   4.1    Total Bilirubin 0.3   0.3    Alkaline Phosphatase 74   60    AST (SGOT) 17   20    ALT (SGPT) 16   20    WBC 6.85   6.01    Hemoglobin 13.2   12.6    Hematocrit 39.3   36.3    Platelets 271   246    Total Cholesterol 210   172    Triglycerides 151   162    HDL Cholesterol 56   41    LDL Cholesterol  127   103        Results for orders placed or performed in visit on 05/31/23   Comprehensive Metabolic Panel    Specimen: Arm, Left; Blood   Result Value Ref Range    Glucose 107 (H) 65 - 99 mg/dL    BUN 14 6 - 20 mg/dL    Creatinine 0.67 0.57 - 1.00 mg/dL    Sodium 139 136 - 145 mmol/L    Potassium 3.5 3.5 - 5.2 mmol/L    Chloride 103 98 - 107 mmol/L    CO2 26.6 22.0 - 29.0 mmol/L    Calcium 9.3 8.6 - 10.5 mg/dL    Total Protein 7.2 6.0 - 8.5 g/dL    Albumin 4.1 3.5 - 5.2 g/dL    ALT (SGPT) 20 1 - 33 U/L    AST (SGOT) 20 1 - 32 U/L    Alkaline Phosphatase 60 39 - 117 U/L    Total Bilirubin 0.3 0.0 - 1.2 mg/dL    Globulin 3.1 gm/dL    A/G Ratio 1.3 g/dL    BUN/Creatinine Ratio 20.9 7.0 - 25.0    Anion Gap 9.4 5.0 - 15.0 mmol/L    eGFR 108.6 >60.0 mL/min/1.73   TSH    Specimen: Arm, Left; Blood   Result Value Ref Range    TSH 0.626 0.270 - 4.200 uIU/mL   Lipid Panel    Specimen: Arm, Left; Blood   Result Value Ref Range    Total Cholesterol 172 0 - 200 mg/dL    Triglycerides 162 (H) 0 - 150 mg/dL    HDL Cholesterol 41 40 - 60 mg/dL    LDL Cholesterol  103 (H) 0 - 100 mg/dL    VLDL Cholesterol 28 5 - 40 mg/dL    LDL/HDL Ratio 2.40    T4, Free    Specimen: Arm, Left; Blood   Result Value Ref Range    Free T4 1.27 0.93 - 1.70 ng/dL   Vitamin D,25-Hydroxy    Specimen: Arm, Left; Blood   Result Value Ref Range    25 Hydroxy, Vitamin D 63.1 30.0 - 100.0 ng/ml   Vitamin B12 & Folate    Specimen: Arm, Left; Blood   Result Value Ref Range    Folate 7.72 4.78 - 24.20 ng/mL    Vitamin B-12 773 211 - 946 pg/mL   Iron Profile    Specimen: Arm,  Left; Blood   Result Value Ref Range    Iron 226 (H) 37 - 145 mcg/dL    Iron Saturation 82 (H) 20 - 50 %    Transferrin 184 (L) 200 - 360 mg/dL    TIBC 274 (L) 298 - 536 mcg/dL   T3, Free    Specimen: Arm, Left; Blood   Result Value Ref Range    T3, Free 3.35 2.00 - 4.40 pg/mL   CBC Auto Differential    Specimen: Arm, Left; Blood   Result Value Ref Range    WBC 6.01 3.40 - 10.80 10*3/mm3    RBC 3.74 (L) 3.77 - 5.28 10*6/mm3    Hemoglobin 12.6 12.0 - 15.9 g/dL    Hematocrit 36.3 34.0 - 46.6 %    MCV 97.1 (H) 79.0 - 97.0 fL    MCH 33.7 (H) 26.6 - 33.0 pg    MCHC 34.7 31.5 - 35.7 g/dL    RDW 11.4 (L) 12.3 - 15.4 %    RDW-SD 40.3 37.0 - 54.0 fl    MPV 11.3 6.0 - 12.0 fL    Platelets 246 140 - 450 10*3/mm3    Neutrophil % 61.4 42.7 - 76.0 %    Lymphocyte % 25.5 19.6 - 45.3 %    Monocyte % 8.2 5.0 - 12.0 %    Eosinophil % 3.2 0.3 - 6.2 %    Basophil % 1.5 0.0 - 1.5 %    Immature Grans % 0.2 0.0 - 0.5 %    Neutrophils, Absolute 3.70 1.70 - 7.00 10*3/mm3    Lymphocytes, Absolute 1.53 0.70 - 3.10 10*3/mm3    Monocytes, Absolute 0.49 0.10 - 0.90 10*3/mm3    Eosinophils, Absolute 0.19 0.00 - 0.40 10*3/mm3    Basophils, Absolute 0.09 0.00 - 0.20 10*3/mm3    Immature Grans, Absolute 0.01 0.00 - 0.05 10*3/mm3    nRBC 0.0 0.0 - 0.2 /100 WBC            Procedures        Assessment and Plan    Diagnoses and all orders for this visit:    1. Major depressive disorder, recurrent, mild (Primary)  Comments:  doing well cont current meds    2. Chronic fatigue  Comments:  check labs and adjust as needed  Orders:  -     Comprehensive Metabolic Panel  -     CBC & Differential  -     TSH  -     Lipid Panel  -     T4, Free  -     Vitamin D,25-Hydroxy  -     Vitamin B12 & Folate  -     Iron Profile    3. Other fatigue    4. Class 1 obesity  Comments:  cont to eat well and exercise  Orders:  -     Comprehensive Metabolic Panel  -     CBC & Differential  -     TSH  -     Lipid Panel  -     T4, Free  -     Vitamin D,25-Hydroxy  -     Vitamin B12 &  Folate  -     Iron Profile    5. Hemochromatosis carrier  Comments:  continue phlebotomy  Orders:  -     Iron Profile    6. Vitamin D deficiency    7. Hypothyroidism, unspecified type  -     T3, Free    Other orders  -     fexofenadine (Allegra Allergy) 180 MG tablet; Take 1 tablet by mouth Daily.  Dispense: 90 tablet; Refill: 1  -     fluticasone (FLONASE) 50 MCG/ACT nasal spray; 2 sprays into the nostril(s) as directed by provider Daily.  Dispense: 11.1 mL; Refill: 1    Will check labs and adjust as needed              Follow Up   Return in about 6 months (around 11/30/2023).  Patient was given instructions and counseling regarding her condition or for health maintenance advice. Please see specific information pulled into the AVS if appropriate.

## 2023-06-05 RX ORDER — DESVENLAFAXINE SUCCINATE 50 MG/1
50 TABLET, EXTENDED RELEASE ORAL DAILY
Qty: 90 TABLET | Refills: 1 | Status: SHIPPED | OUTPATIENT
Start: 2023-06-05

## 2023-06-09 RX ORDER — ESTRADIOL AND NORETHINDRONE ACETATE 1; .5 MG/1; MG/1
1 TABLET ORAL DAILY
Qty: 84 TABLET | Refills: 3 | Status: SHIPPED | OUTPATIENT
Start: 2023-06-09

## 2023-06-09 NOTE — TELEPHONE ENCOUNTER
Caller: PillaiKeerthi    Relationship: Self    Best call back number: [phone]     Requested Prescriptions:   Requested Prescriptions     Pending Prescriptions Disp Refills    estradiol-norethindrone (ACTIVELLA) 1-0.5 MG per tablet [Pharmacy Med Name: estradiol-norethindrone acet 1 mg-0.5 mg tablet] 84 tablet 3     Sig: Take 1 tablet by mouth Daily.        Pharmacy where request should be sent: [facility] - [place], [place] - [address] [facility] - [phone] [facility] [phone] FX     Last office visit with prescribing clinician: 5/31/2023   Last telemedicine visit with prescribing clinician: Visit date not found   Next office visit with prescribing clinician: 12/1/2023     Additional details provided by patient: PATIENT IS NEEDING A REFILL     Does the patient have less than a 3 day supply:  [x] Yes  [] No    Would you like a call back once the refill request has been completed: [x] Yes [] No    If the office needs to give you a call back, can they leave a voicemail: [x] Yes [] No    Martha Calvillo Rep   06/09/23 11:38 EDT

## 2023-06-15 ENCOUNTER — TELEPHONE (OUTPATIENT)
Dept: PLASTIC SURGERY | Facility: CLINIC | Age: 48
End: 2023-06-15
Payer: COMMERCIAL

## 2023-06-15 NOTE — TELEPHONE ENCOUNTER
PATIENT IS CANCELING SURGERY AND ALL FUTURE APPOINTMENTS AS SHE CAN NOT AFFORD THE ESTIMATE SHE WAS GIVEN FROM HER INSURANCE.

## 2023-07-25 RX ORDER — TRAZODONE HYDROCHLORIDE 50 MG/1
TABLET ORAL
Qty: 90 TABLET | Refills: 1 | Status: SHIPPED | OUTPATIENT
Start: 2023-07-25

## 2023-08-19 NOTE — TELEPHONE ENCOUNTER
Caller: Keerthi Pillai    Relationship: Self    Best call back number: [phone]    Who are you requesting to speak with (clinical staff, provider,  specific staff member): MEDICAL STAFF    What was the call regarding:PATIENT HAS REQUESTED A CT SCAN AND WOULD LIKE TO KNOW THE STATUS OF THE ORDER. PLEASE CALL PATIENT TO ADVISE.  
Please call and let her know that I ordered an MRI for her  
Name band;

## 2023-08-21 ENCOUNTER — OFFICE VISIT (OUTPATIENT)
Dept: INTERNAL MEDICINE | Facility: CLINIC | Age: 48
End: 2023-08-21
Payer: COMMERCIAL

## 2023-08-21 VITALS
SYSTOLIC BLOOD PRESSURE: 120 MMHG | HEIGHT: 64 IN | OXYGEN SATURATION: 97 % | WEIGHT: 164.2 LBS | TEMPERATURE: 98.2 F | DIASTOLIC BLOOD PRESSURE: 82 MMHG | HEART RATE: 66 BPM | BODY MASS INDEX: 28.03 KG/M2

## 2023-08-21 DIAGNOSIS — R53.83 OTHER FATIGUE: Primary | ICD-10-CM

## 2023-08-21 DIAGNOSIS — R21 RASH AND NONSPECIFIC SKIN ERUPTION: ICD-10-CM

## 2023-08-21 LAB
ALBUMIN SERPL-MCNC: 4.1 G/DL (ref 3.5–5.2)
ALBUMIN/GLOB SERPL: 1.5 G/DL
ALP SERPL-CCNC: 59 U/L (ref 39–117)
ALT SERPL W P-5'-P-CCNC: 17 U/L (ref 1–33)
ANION GAP SERPL CALCULATED.3IONS-SCNC: 9.3 MMOL/L (ref 5–15)
AST SERPL-CCNC: 22 U/L (ref 1–32)
BASOPHILS # BLD AUTO: 0.11 10*3/MM3 (ref 0–0.2)
BASOPHILS NFR BLD AUTO: 1.7 % (ref 0–1.5)
BILIRUB SERPL-MCNC: 0.3 MG/DL (ref 0–1.2)
BUN SERPL-MCNC: 13 MG/DL (ref 6–20)
BUN/CREAT SERPL: 19.4 (ref 7–25)
CALCIUM SPEC-SCNC: 9.2 MG/DL (ref 8.6–10.5)
CHLORIDE SERPL-SCNC: 102 MMOL/L (ref 98–107)
CO2 SERPL-SCNC: 24.7 MMOL/L (ref 22–29)
CREAT SERPL-MCNC: 0.67 MG/DL (ref 0.57–1)
DEPRECATED RDW RBC AUTO: 39.3 FL (ref 37–54)
EGFRCR SERPLBLD CKD-EPI 2021: 108.6 ML/MIN/1.73
EOSINOPHIL # BLD AUTO: 0.15 10*3/MM3 (ref 0–0.4)
EOSINOPHIL NFR BLD AUTO: 2.4 % (ref 0.3–6.2)
ERYTHROCYTE [DISTWIDTH] IN BLOOD BY AUTOMATED COUNT: 11.4 % (ref 12.3–15.4)
GLOBULIN UR ELPH-MCNC: 2.8 GM/DL
GLUCOSE SERPL-MCNC: 86 MG/DL (ref 65–99)
HCT VFR BLD AUTO: 37.6 % (ref 34–46.6)
HGB BLD-MCNC: 13.1 G/DL (ref 12–15.9)
IMM GRANULOCYTES # BLD AUTO: 0.02 10*3/MM3 (ref 0–0.05)
IMM GRANULOCYTES NFR BLD AUTO: 0.3 % (ref 0–0.5)
LYMPHOCYTES # BLD AUTO: 1.51 10*3/MM3 (ref 0.7–3.1)
LYMPHOCYTES NFR BLD AUTO: 24 % (ref 19.6–45.3)
MCH RBC QN AUTO: 33.8 PG (ref 26.6–33)
MCHC RBC AUTO-ENTMCNC: 34.8 G/DL (ref 31.5–35.7)
MCV RBC AUTO: 96.9 FL (ref 79–97)
MONOCYTES # BLD AUTO: 0.5 10*3/MM3 (ref 0.1–0.9)
MONOCYTES NFR BLD AUTO: 7.9 % (ref 5–12)
NEUTROPHILS NFR BLD AUTO: 4 10*3/MM3 (ref 1.7–7)
NEUTROPHILS NFR BLD AUTO: 63.7 % (ref 42.7–76)
NRBC BLD AUTO-RTO: 0 /100 WBC (ref 0–0.2)
PLATELET # BLD AUTO: 261 10*3/MM3 (ref 140–450)
PMV BLD AUTO: 11.5 FL (ref 6–12)
POTASSIUM SERPL-SCNC: 3.8 MMOL/L (ref 3.5–5.2)
PROT SERPL-MCNC: 6.9 G/DL (ref 6–8.5)
RBC # BLD AUTO: 3.88 10*6/MM3 (ref 3.77–5.28)
SODIUM SERPL-SCNC: 136 MMOL/L (ref 136–145)
TSH SERPL DL<=0.05 MIU/L-ACNC: 0.59 UIU/ML (ref 0.27–4.2)
WBC NRBC COR # BLD: 6.29 10*3/MM3 (ref 3.4–10.8)

## 2023-08-21 PROCEDURE — 99213 OFFICE O/P EST LOW 20 MIN: CPT | Performed by: PHYSICIAN ASSISTANT

## 2023-08-21 PROCEDURE — 87484 EHRLICHA CHAFFEENSIS AMP PRB: CPT | Performed by: PHYSICIAN ASSISTANT

## 2023-08-21 PROCEDURE — 86664 EPSTEIN-BARR NUCLEAR ANTIGEN: CPT | Performed by: PHYSICIAN ASSISTANT

## 2023-08-21 PROCEDURE — 86665 EPSTEIN-BARR CAPSID VCA: CPT | Performed by: PHYSICIAN ASSISTANT

## 2023-08-21 PROCEDURE — 80050 GENERAL HEALTH PANEL: CPT | Performed by: PHYSICIAN ASSISTANT

## 2023-08-21 PROCEDURE — 86618 LYME DISEASE ANTIBODY: CPT | Performed by: PHYSICIAN ASSISTANT

## 2023-08-21 PROCEDURE — 87798 DETECT AGENT NOS DNA AMP: CPT | Performed by: PHYSICIAN ASSISTANT

## 2023-08-21 PROCEDURE — 87468 ANAPLSMA PHGCYTOPHLM AMP PRB: CPT | Performed by: PHYSICIAN ASSISTANT

## 2023-08-21 PROCEDURE — 86645 CMV ANTIBODY IGM: CPT | Performed by: PHYSICIAN ASSISTANT

## 2023-08-21 PROCEDURE — 86644 CMV ANTIBODY: CPT | Performed by: PHYSICIAN ASSISTANT

## 2023-08-21 RX ORDER — TRIAMCINOLONE ACETONIDE 1 MG/G
1 CREAM TOPICAL 2 TIMES DAILY
Qty: 28.4 G | Refills: 0 | Status: SHIPPED | OUTPATIENT
Start: 2023-08-21 | End: 2023-08-28

## 2023-08-21 NOTE — ASSESSMENT & PLAN NOTE
Concerned for possible dyshidrotic eczema.  Will treat with trimacinolone and send patient to dermatology in case rash does not improve or worsens.

## 2023-08-21 NOTE — ASSESSMENT & PLAN NOTE
Likely viral etiology.  Will check labs that could be contributing, including EBV, CMV and tick panel.  If symptoms persist or worsen patient needs to return.  Call for any questions or concerns.

## 2023-08-21 NOTE — PROGRESS NOTES
"Chief Complaint  Illness (Seen at  on 7/29) and Sore Throat    Subjective          Keerthi Pillai presents to [facility]    Fatigue, sore throat- symptoms present over the past 3 weeks.  Patient was seen at urgent care initially and diagnosed with viral infection.  The fatigue and sore throat has persisted, coughing has improved.  No fever.  No sick contacts that she is aware of.  She has been taking delsym for the cough. No shortness of breath but has noticed tenderness to the touch in her central chest.      Rash- patient has noticed a rash on her left thumb and middle finger over the last several weeks.  It is itchy and burning sensation.  She has tried mupirocin ointment and vaseline.      Objective   Vital Signs:   /82 (BP Location: Left arm, Patient Position: Sitting, Cuff Size: Adult)   Pulse 66   Temp 98.2 øF (36.8 øC) (Temporal)   Ht 162.6 cm (64.02\")   Wt 74.5 kg (164 lb 3.2 oz)   SpO2 97%   BMI 28.17 kg/mý     Physical Exam  Vitals reviewed.   Constitutional:       Appearance: Normal appearance. She is well-developed.   HENT:      Head: Normocephalic and atraumatic.   Eyes:      Conjunctiva/sclera: Conjunctivae normal.      Pupils: Pupils are equal, round, and reactive to light.   Cardiovascular:      Rate and Rhythm: Normal rate and regular rhythm.      Heart sounds: No murmur heard.    No friction rub. No gallop.   Pulmonary:      Effort: Pulmonary effort is normal.      Breath sounds: Normal breath sounds. No wheezing or rhonchi.   Skin:     General: Skin is warm and dry.      Comments: Left thenar erythematous with papules   Neurological:      Mental Status: She is alert and oriented to person, place, and time.      Cranial Nerves: No cranial nerve deficit.   Psychiatric:         Mood and Affect: Mood and affect normal.         Behavior: Behavior normal.         Thought Content: Thought content normal.         Judgment: Judgment normal. "      Result Review :          Procedures      Assessment and Plan    Diagnoses and all orders for this visit:    1. Other fatigue (Primary)  Assessment & Plan:  Likely viral etiology.  Will check labs that could be contributing, including EBV, CMV and tick panel.  If symptoms persist or worsen patient needs to return.  Call for any questions or concerns.    Orders:  -     CBC w AUTO Differential  -     Comprehensive metabolic panel  -     EBV Antibody Profile  -     Cytomegalovirus Antibody, IgG  -     Cytomegalovirus Antibody, IgM  -     TSH  -     Tick Panel    2. Rash and nonspecific skin eruption  Assessment & Plan:  Concerned for possible dyshidrotic eczema.  Will treat with trimacinolone and send patient to dermatology in case rash does not improve or worsens.      Orders:  -     Ambulatory Referral to Dermatology    Other orders  -     triamcinolone (KENALOG) 0.1 % cream; Apply 1 application  topically to the appropriate area as directed 2 (Two) Times a Day for 7 days.  Dispense: 28.4 g; Refill: 0              Follow Up   No follow-ups on file.  Patient was given instructions and counseling regarding her condition or for health maintenance advice. Please see specific information pulled into the AVS if appropriate.

## 2023-08-23 LAB
B BURGDOR IGG+IGM SER QL IA: NEGATIVE
CMV IGG SERPL IA-ACNC: <0.6 U/ML (ref 0–0.59)
CMV IGM SERPL IA-ACNC: <30 AU/ML (ref 0–29.9)
EBV NA IGG SER IA-ACNC: 392 U/ML (ref 0–17.9)
EBV VCA IGG SER IA-ACNC: 380 U/ML (ref 0–17.9)
EBV VCA IGM SER IA-ACNC: <36 U/ML (ref 0–35.9)
SERVICE CMNT-IMP: ABNORMAL

## 2023-08-26 LAB — RICKETTSIA RICKETTSII DNA, RT: NOT DETECTED

## 2023-08-27 LAB
A PHAGOCYTOPH DNA BLD QL NAA+PROBE: NEGATIVE
E CHAFFEENSIS DNA BLD QL NAA+PROBE: NEGATIVE

## 2023-08-29 RX ORDER — HYDROCODONE POLISTIREX AND CHLORPHENIRAMINE POLISTIREX 10; 8 MG/5ML; MG/5ML
5 SUSPENSION, EXTENDED RELEASE ORAL EVERY 12 HOURS PRN
Qty: 150 ML | Refills: 0 | Status: SHIPPED | OUTPATIENT
Start: 2023-08-29

## 2023-09-28 RX ORDER — TIZANIDINE 2 MG/1
2 TABLET ORAL NIGHTLY PRN
Qty: 90 TABLET | Refills: 1 | Status: SHIPPED | OUTPATIENT
Start: 2023-09-28

## 2023-10-10 ENCOUNTER — TELEPHONE (OUTPATIENT)
Dept: INTERNAL MEDICINE | Facility: CLINIC | Age: 48
End: 2023-10-10
Payer: COMMERCIAL

## 2023-10-10 NOTE — TELEPHONE ENCOUNTER
Caller: Keerthi Pillai    Relationship: Self    Best call back number: [phone]    What form or medical record are you requesting: CLEARANCE FOR KNEE SURGERY ON 12/07    Who is requesting this form or medical record from you: DR EDWARDS     How would you like to receive the form or medical records (pick-up, mail, fax): PICK-UP     Timeframe paperwork needed: ASAP     Additional notes: PATIENT STATES SHE HAS THE FORM AND WILL BE BRINGING IT IN TOMORROW TO DROP OFF TO PCP.

## 2023-10-11 ENCOUNTER — HOSPITAL ENCOUNTER (EMERGENCY)
Facility: HOSPITAL | Age: 48
Discharge: HOME | End: 2023-10-11
Attending: EMERGENCY MEDICINE | Admitting: EMERGENCY MEDICINE
Payer: COMMERCIAL

## 2023-10-11 ENCOUNTER — APPOINTMENT (OUTPATIENT)
Dept: CT IMAGING | Facility: HOSPITAL | Age: 48
End: 2023-10-11
Payer: COMMERCIAL

## 2023-10-11 VITALS
HEIGHT: 61 IN | HEART RATE: 64 BPM | BODY MASS INDEX: 31.97 KG/M2 | WEIGHT: 169.31 LBS | RESPIRATION RATE: 16 BRPM | SYSTOLIC BLOOD PRESSURE: 135 MMHG | OXYGEN SATURATION: 100 % | DIASTOLIC BLOOD PRESSURE: 57 MMHG | TEMPERATURE: 97.9 F

## 2023-10-11 DIAGNOSIS — S06.0X0A CONCUSSION WITHOUT LOSS OF CONSCIOUSNESS, INITIAL ENCOUNTER: Primary | ICD-10-CM

## 2023-10-11 DIAGNOSIS — S16.1XXA CERVICAL STRAIN, ACUTE, INITIAL ENCOUNTER: ICD-10-CM

## 2023-10-11 PROCEDURE — 70450 CT HEAD/BRAIN W/O DYE: CPT

## 2023-10-11 PROCEDURE — 99284 EMERGENCY DEPT VISIT MOD MDM: CPT

## 2023-10-11 PROCEDURE — 72125 CT NECK SPINE W/O DYE: CPT
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
## 2023-11-02 ENCOUNTER — PRIOR AUTHORIZATION (OUTPATIENT)
Dept: INTERNAL MEDICINE | Facility: CLINIC | Age: 48
End: 2023-11-02
Payer: COMMERCIAL

## 2023-11-02 NOTE — TELEPHONE ENCOUNTER
Pa started for celecoxib via cover my meds     Pa approved     The authorization is valid from 10/03/2023 through 11/01/2024. A letter of explanation will also be  mailed to the patient.

## 2023-11-09 RX ORDER — CELECOXIB 200 MG/1
CAPSULE ORAL
Qty: 90 CAPSULE | Refills: 1 | Status: SHIPPED | OUTPATIENT
Start: 2023-11-09

## 2023-11-29 RX ORDER — DESVENLAFAXINE SUCCINATE 50 MG/1
50 TABLET, EXTENDED RELEASE ORAL DAILY
Qty: 90 TABLET | Refills: 1 | Status: SHIPPED | OUTPATIENT
Start: 2023-11-29 | End: 2023-12-01 | Stop reason: SDUPTHER

## 2023-12-01 ENCOUNTER — OFFICE VISIT (OUTPATIENT)
Dept: INTERNAL MEDICINE | Facility: CLINIC | Age: 48
End: 2023-12-01
Payer: COMMERCIAL

## 2023-12-01 VITALS
HEART RATE: 67 BPM | DIASTOLIC BLOOD PRESSURE: 90 MMHG | BODY MASS INDEX: 31.45 KG/M2 | HEIGHT: 61 IN | WEIGHT: 166.6 LBS | OXYGEN SATURATION: 96 % | TEMPERATURE: 97.9 F | SYSTOLIC BLOOD PRESSURE: 122 MMHG

## 2023-12-01 DIAGNOSIS — F33.0 MAJOR DEPRESSIVE DISORDER, RECURRENT, MILD: Primary | ICD-10-CM

## 2023-12-01 DIAGNOSIS — J01.90 SUBACUTE SINUSITIS, UNSPECIFIED LOCATION: ICD-10-CM

## 2023-12-01 DIAGNOSIS — G89.29 CHRONIC PAIN OF RIGHT KNEE: ICD-10-CM

## 2023-12-01 DIAGNOSIS — M25.561 CHRONIC PAIN OF RIGHT KNEE: ICD-10-CM

## 2023-12-01 PROCEDURE — 99214 OFFICE O/P EST MOD 30 MIN: CPT | Performed by: INTERNAL MEDICINE

## 2023-12-01 RX ORDER — TRAMADOL HYDROCHLORIDE 50 MG/1
TABLET ORAL
COMMUNITY
Start: 2023-11-29

## 2023-12-01 RX ORDER — CEFDINIR 300 MG/1
300 CAPSULE ORAL 2 TIMES DAILY
Qty: 10 CAPSULE | Refills: 0 | Status: SHIPPED | OUTPATIENT
Start: 2023-12-01 | End: 2023-12-06

## 2023-12-01 RX ORDER — DESVENLAFAXINE 100 MG/1
100 TABLET, EXTENDED RELEASE ORAL DAILY
Qty: 90 TABLET | Refills: 1 | Status: SHIPPED | OUTPATIENT
Start: 2023-12-01

## 2023-12-01 RX ORDER — APIXABAN 2.5 MG/1
TABLET, FILM COATED ORAL
COMMUNITY
Start: 2023-11-16

## 2023-12-01 RX ORDER — HYDROCODONE BITARTRATE AND ACETAMINOPHEN 10; 325 MG/1; MG/1
1-2 TABLET ORAL EVERY 6 HOURS PRN
COMMUNITY
Start: 2023-11-29

## 2023-12-01 RX ORDER — TRIAMCINOLONE ACETONIDE 1 MG/G
OINTMENT TOPICAL
COMMUNITY
Start: 2023-11-29

## 2023-12-01 RX ORDER — PROMETHAZINE HYDROCHLORIDE 25 MG/1
25 TABLET ORAL EVERY 6 HOURS PRN
COMMUNITY
Start: 2023-11-29

## 2023-12-01 NOTE — PROGRESS NOTES
"Chief Complaint  Sinus Problem (Pt states that she still thinks that she might have an sinus infection again. She also has been cough her \"head off.\")    Subjective      Keerthi Pillai is a 48 y.o. female who presents to [facility] with a past medical history of  Past Medical History:   Diagnosis Date    Acne     Anxiety     Arthritis     Benign essential hypertension     Breast cancer     Cancer     Depression     Forgetfulness     Hemochromatosis 09/03/2018    Lumbago 04/21/2020    low back pain    Migraine     Moderate episode of recurrent major depressive disorder 12/10/2017    will adjust lexapro and wellbutrin and see how she does    Night sweats     Postmenopausal 04/21/2020    Primary osteoarthritis of right knee 03/07/2018    Sinus trouble     unspecified    Spondylolisthesis, lumbar region 04/21/2020     States that she feels like she is having trouble with decision making  She feels that she might be more irritable  She is having some trouble with sleeping as well    Has been coughing for awhile  No pressure  Does noticed drainage in the back of her throat    Having some trouble focusing at work, particularly when other people are around      Objective   Vital Signs:   Vitals:    12/01/23 1115   BP: 122/90   Pulse: 67   Temp: 97.9 °F (36.6 °C)   TempSrc: Temporal   SpO2: 96%   Weight: 75.6 kg (166 lb 9.6 oz)   Height: 154.9 cm (61\")   PainSc: 0-No pain     Body mass index is 31.48 kg/m².    Wt Readings from Last 3 Encounters:   12/01/23 75.6 kg (166 lb 9.6 oz)   10/11/23 76.8 kg (169 lb 5 oz)   08/21/23 74.5 kg (164 lb 3.2 oz)     BP Readings from Last 3 Encounters:   12/01/23 122/90   10/11/23 135/57   08/21/23 120/82       Health Maintenance   Topic Date Due    COVID-19 Vaccine (4 - 2023-24 season) 03/01/2024 (Originally 9/1/2023)    ANNUAL PHYSICAL  03/01/2024 (Originally 6/15/2021)    INFLUENZA VACCINE  03/31/2024 (Originally 8/1/2023)    BMI " FOLLOWUP  01/27/2024    PAP SMEAR  05/03/2024    COLORECTAL CANCER SCREENING  06/07/2028    TDAP/TD VACCINES (2 - Td or Tdap) 01/27/2033    HEPATITIS C SCREENING  Completed    Pneumococcal Vaccine 0-64  Aged Out       Physical Exam  Vitals reviewed.   Constitutional:       Appearance: Normal appearance. She is well-developed.   HENT:      Head: Normocephalic and atraumatic.      Right Ear: External ear normal.      Left Ear: External ear normal.   Eyes:      Conjunctiva/sclera: Conjunctivae normal.      Pupils: Pupils are equal, round, and reactive to light.   Cardiovascular:      Rate and Rhythm: Normal rate and regular rhythm.      Heart sounds: No murmur heard.     No friction rub. No gallop.   Pulmonary:      Effort: Pulmonary effort is normal.      Breath sounds: Normal breath sounds. No wheezing or rhonchi.   Skin:     General: Skin is warm and dry.   Neurological:      Mental Status: She is alert and oriented to person, place, and time.   Psychiatric:         Mood and Affect: Affect normal.         Behavior: Behavior normal.         Thought Content: Thought content normal.            Result Review :  The following data was reviewed by: Jennifer Ocasio MD on 12/01/2023:      Procedures        Assessment and Plan   Diagnoses and all orders for this visit:    1. Major depressive disorder, recurrent, mild (Primary)  Comments:  will try increasing pristiq  Overview:  Doing great on current meds continue for now      2. Subacute sinusitis, unspecified location  Comments:  will try cefdinir, just short course prior to surgery    3. Chronic pain of right knee  Comments:  planing for partial  knee replacement Tuesday, cont to monitor    Other orders  -     desvenlafaxine (PRISTIQ) 100 MG 24 hr tablet; Take 1 tablet by mouth Daily.  Dispense: 90 tablet; Refill: 1  -     cefdinir (OMNICEF) 300 MG capsule; Take 1 capsule by mouth 2 (Two) Times a Day for 5 days.  Dispense: 10 capsule; Refill: 0                  FOLLOW  UP  Return in about 2 months (around 2/1/2024).  Patient was given instructions and counseling regarding her condition or for health maintenance advice. Please see specific information pulled into the AVS if appropriate.       Jennifer Ocasio MD  12/01/23  12:08 EST    CURRENT & DISCONTINUED MEDICATIONS  Current Outpatient Medications   Medication Instructions    cefdinir (OMNICEF) 300 mg, Oral, 2 Times Daily    celecoxib (CeleBREX) 200 MG capsule TAKE ONE CAPSULE BY MOUTH EVERY DAY    desvenlafaxine (PRISTIQ) 100 mg, Oral, Daily    Eliquis 2.5 MG tablet tablet TAKE ONE TABLET BY MOUTH TWICE DAILY for TWO weeks after surgery    estradiol-norethindrone (ACTIVELLA) 1-0.5 MG per tablet 1 tablet, Oral, Daily    fexofenadine (ALLEGRA ALLERGY) 180 mg, Oral, Daily    fluticasone (FLONASE) 50 MCG/ACT nasal spray 2 sprays, Nasal, Daily    HYDROcodone-acetaminophen (NORCO)  MG per tablet 1-2 tablets, Oral, Every 6 Hours PRN, for pain    lisinopril (PRINIVIL,ZESTRIL) 20 MG tablet TAKE ONE TABLET BY MOUTH EVERY DAY    mupirocin (BACTROBAN) 2 % ointment apply 1 gram to opening of each nostril twice per day for 5 consecutive days before surgery and morning of surgery    promethazine (PHENERGAN) 25 mg, Oral, Every 6 Hours PRN, for nausea    tiZANidine (ZANAFLEX) 2 mg, Oral, Nightly PRN    traMADol (ULTRAM) 50 MG tablet TAKE ONE TABLET BY MOUTH EVERY 6 HOURS AS NEEDED for breakthrough pain    traZODone (DESYREL) 50 MG tablet TAKE ONE TABLET BY MOUTH every NIGHT    triamcinolone (KENALOG) 0.1 % ointment Apply topically to hands twice a day as needed up to 3 weeks. Stop for 1 week and repeat only as needed       Medications Discontinued During This Encounter   Medication Reason    Hydrocod Santos-Chlorphe Santos ER (TUSSIONEX PENNKINETIC) 10-8 MG/5ML ER suspension *Therapy completed    desvenlafaxine (PRISTIQ) 50 MG 24 hr tablet Reorder

## 2023-12-07 ENCOUNTER — TREATMENT (OUTPATIENT)
Dept: PHYSICAL THERAPY | Facility: CLINIC | Age: 48
End: 2023-12-07
Payer: COMMERCIAL

## 2023-12-07 DIAGNOSIS — M25.561 ACUTE PAIN OF RIGHT KNEE: Primary | ICD-10-CM

## 2023-12-07 DIAGNOSIS — R26.9 GAIT DISTURBANCE: ICD-10-CM

## 2023-12-07 DIAGNOSIS — Z96.651 STATUS POST RIGHT PARTIAL KNEE REPLACEMENT: ICD-10-CM

## 2023-12-07 PROCEDURE — 97161 PT EVAL LOW COMPLEX 20 MIN: CPT | Performed by: PHYSICAL THERAPIST

## 2023-12-07 PROCEDURE — 97110 THERAPEUTIC EXERCISES: CPT | Performed by: PHYSICAL THERAPIST

## 2023-12-07 NOTE — PROGRESS NOTES
Physical Therapy Initial Evaluation and Plan of Care  [address] [address]      Patient: Keerthi Pillai   : 1975  Diagnosis/ICD-10 Code:  Acute pain of right knee [M25.561]  Referring practitioner: Ari Murguia MD  Date of Initial Visit: 2023  Today's Date: 2023  Patient seen for 1 sessions           Subjective Questionnaire: LEFS: =99% limitation      Subjective Evaluation    History of Present Illness  Mechanism of injury: Patient is a 48 yr old female referred to physical therapy S/p partial right knee replacement 23.  Patient using standard walker for ambulation. Patient was not using an assistive device prior to surgery. Patient reports compliance with pain medication and cold pack. Patient works sedentary job and currently off work.       Pain  Current pain ratin  Location: right knee  Quality: sharp, dull ache and tight  Relieving factors: change in position, medications, ice, relaxation and rest    Patient Goals  Patient goals for therapy: decreased pain, increased strength, return to sport/leisure activities, independence with ADLs/IADLs, improved balance, increased motion, return to work and decreased edema             Objective          Passive Range of Motion     Right Knee   Flexion: 60 degrees   Extension: 18 degrees with pain    Additional Passive Range of Motion Details  Not able to achieve neutral extension    Strength/Myotome Testing     Right Knee   Flexion: 2  Extension: 2  Quadriceps contraction: poor    Right Knee Flexibility Comments:   Tight hamstrings and gastroc           Assessment & Plan       Assessment  Impairments: abnormal gait, abnormal muscle firing, abnormal or restricted ROM, activity intolerance, impaired balance, impaired physical strength, lacks appropriate home exercise program, pain with function, safety issue and weight-bearing intolerance   Functional limitations: carrying objects, lifting, walking, uncomfortable  because of pain, moving in bed, sitting, standing, stooping and unable to perform repetitive tasks   Assessment details: Pt presents with limitations, noted by evaluation that impede patient's ability to ambulate/functional mobility.  The skills of a therapist will be required to safely and effectively implement the following treatment plan to restore maximal level of function.      Prognosis: good  Prognosis details: Patient educated on importance of moving right knee for improved flexion and extension.     Goals  Plan Goals: 1. The patient has limited ROM of the right knee.   LTG 1: 12weeks:  The patient will demonstrate 0 to 120 degrees of ROM for the right knee in order to allow patient to normalize gait/negotiate stairs.   STATUS:  New   STG 1a: 6 weeks:  The patient will demonstrate 0 to 110 degrees of ROM for the right knee.   STATUS:  New      2. The patient has limited strength of the right knee.   LTG 2: 12 weeks: The patient will demonstrate 4+/5 strength for right knee flexion and extension in order to allow patient improved joint stability.   STATUS:  New   STG 2a: 6 weeks: The patient will demonstrate 3+/5 strength for right knee flexion and extension   STATUS:  New       3. The patient has gait dysfunction.   LTG 3: 8 weeks:  The patient will ambulate without assistive device, independently, for community distances with minimal limp to the right lower extremity in order to improve mobility and allow patient to perform activities such as grocery shopping with greater ease.   STATUS:  New   STG 3a: The patient will be independent in HEP.   STATUS:  New      4. The patient complains of right knee pain.   LTG 4: 12 weeks:  The patient will report a pain rating of 2/10 or better in order to improve   tolerance to activities of daily living and improve sleep quality.   STATUS:  New   STG 4a: 6weeks:  The patient will report a pain rating of 4/10 or better.   STATUS:  New      5. Mobility: Walking/Moving  Around Functional Limitation     LTG 5: 12weeks:  The patient will demonstrate 25% limitation by achieving a score of 60/80 on the Lower Extremity Functional Scale.   STATUS:  New   STG 5a: 6 weeks:  The patient will demonstrate 50% limitation by achieving a score of 40/80 on the Lower Extremity Functional Scale.     STATUS:  New       Plan  Therapy options: will be seen for skilled therapy services  Planned modality interventions: cryotherapy and TENS  Planned therapy interventions: manual therapy, soft tissue mobilization, strengthening, stretching, therapeutic activities, joint mobilization, home exercise program, gait training, functional ROM exercises, flexibility and balance/weight-bearing training  Frequency: 3x week  Duration in weeks: 12  Treatment plan discussed with: patient  Plan details: 3xwk for 4 wks then 2xwk for 8 wks.     History # of Personal Factors and/or Comorbidities: LOW (0)  Examination of Body System(s): # of elements: LOW (1-2)  Clinical Presentation: STABLE   Clinical Decision Making: LOW       Visit Diagnoses:    ICD-10-CM ICD-9-CM   1. Acute pain of right knee  M25.561 719.46   2. Gait disturbance  R26.9 781.2   3. Status post right partial knee replacement  Z96.651 V43.65       Timed:  Manual Therapy:         mins  02430;  Therapeutic Exercise:    19     mins  95024;     Neuromuscular Dayna:        mins  95953;    Therapeutic Activity:          mins  16964;     Gait Training:           mins  65023;     Ultrasound:          mins  99877;    Electrical Stimulation:         mins  12053 ( );    Untimed:  Electrical Stimulation:         mins  62748 ( );  Mechanical Traction:         mins  97579;    PT evaluation     Low Eval                        28   Mins  55562  Mod Eval                             Mins  84370  High Eval                           Mins  07563    Timed Treatment:   19   mins   Total Treatment:     47   mins    PT SIGNATURE: Nery Lozano  PT     Electronically singed 12/8/2023    [place] PT license: [id number]         Initial certification Certification Period: 12/8/2023 thru 3/6/2024  I certify that the therapy services are furnished while this patient is under my care.  The services outlined above are required by this patient, and will be reviewed every 90 days.    PHYSICIAN: Ari Murguia MD  NPI: [id number]                                      DATE:     Please sign and return via fax to [phone]  Thank you, [facility].

## 2023-12-11 ENCOUNTER — TREATMENT (OUTPATIENT)
Dept: PHYSICAL THERAPY | Facility: CLINIC | Age: 48
End: 2023-12-11
Payer: COMMERCIAL

## 2023-12-11 DIAGNOSIS — R26.9 GAIT DISTURBANCE: ICD-10-CM

## 2023-12-11 DIAGNOSIS — Z96.651 STATUS POST RIGHT PARTIAL KNEE REPLACEMENT: ICD-10-CM

## 2023-12-11 DIAGNOSIS — M25.561 ACUTE PAIN OF RIGHT KNEE: Primary | ICD-10-CM

## 2023-12-11 PROCEDURE — 97110 THERAPEUTIC EXERCISES: CPT | Performed by: PHYSICAL THERAPIST

## 2023-12-11 PROCEDURE — 97140 MANUAL THERAPY 1/> REGIONS: CPT | Performed by: PHYSICAL THERAPIST

## 2023-12-11 NOTE — PROGRESS NOTES
Physical Therapy Daily Treatment Note      Patient: Keerthi Pillai   : 1975  Referring practitioner: Ari Murguia MD  Date of Initial Visit: Type: THERAPY  Noted: 2023  Today's Date: 2023  Patient seen for 2 sessions           Subjective   Keerthi Pillai reports: compliant with home exercises, having some right knee posterior soreness.      Objective   See Exercise, Manual, and Modality Logs for complete treatment.       Assessment & Plan       Assessment  Assessment details: Patient demonstrates better ambulation and movement; AAROM right knee into flexion 70 degrees in supine and 78 in sitting.     Visit Diagnoses:    ICD-10-CM ICD-9-CM   1. Acute pain of right knee  M25.561 719.46   2. Gait disturbance  R26.9 781.2   3. Status post right partial knee replacement  Z96.651 V43.65       Progress per Plan of Care           Timed:  Manual Therapy:    8     mins  11526;  Therapeutic Exercise:    22     mins  69306;     Neuromuscular Dayna:        mins  81686;    Therapeutic Activity:          mins  28485;     Gait Training:           mins  81534;     Ultrasound:          mins  48453;    Electrical Stimulation:         mins  35181 ( );    Untimed:  Electrical Stimulation:         mins  85575 ( );  Mechanical Traction:         mins  15944;     Timed Treatment:   30   mins   Total Treatment:     30   mins  Nery Lozano PT    Electronically singed 2023      [place] PT license: [id number]  Physical Therapist

## 2023-12-13 ENCOUNTER — TREATMENT (OUTPATIENT)
Dept: PHYSICAL THERAPY | Facility: CLINIC | Age: 48
End: 2023-12-13
Payer: COMMERCIAL

## 2023-12-13 DIAGNOSIS — M25.561 ACUTE PAIN OF RIGHT KNEE: Primary | ICD-10-CM

## 2023-12-13 DIAGNOSIS — Z96.651 STATUS POST RIGHT PARTIAL KNEE REPLACEMENT: ICD-10-CM

## 2023-12-13 DIAGNOSIS — R26.9 GAIT DISTURBANCE: ICD-10-CM

## 2023-12-13 PROCEDURE — 97530 THERAPEUTIC ACTIVITIES: CPT | Performed by: PHYSICAL THERAPIST

## 2023-12-13 PROCEDURE — 97110 THERAPEUTIC EXERCISES: CPT | Performed by: PHYSICAL THERAPIST

## 2023-12-13 NOTE — PROGRESS NOTES
Physical Therapy Daily Treatment Note      Patient: Keerthi Pillai   : 1975  Referring practitioner: Ari Murguia MD  Date of Initial Visit: Type: THERAPY  Noted: 2023  Today's Date: 2023  Patient seen for 3 sessions           Subjective   Keerthi Pillai reports: right knee pain 5/10      Objective   See Exercise, Manual, and Modality Logs for complete treatment.       Assessment & Plan       Assessment  Assessment details: Patient able to tolerate progression in right knee mobility.   AAROM right knee flexion 80 degrees      Visit Diagnoses:    ICD-10-CM ICD-9-CM   1. Acute pain of right knee  M25.561 719.46   2. Gait disturbance  R26.9 781.2   3. Status post right partial knee replacement  Z96.651 V43.65       Progress per Plan of Care and Progress strengthening /stabilization /functional activity           Timed:  Manual Therapy:    10     mins  21483;  Therapeutic Exercise:    28     mins  56660;     Neuromuscular Dayna:        mins  46845;    Therapeutic Activity:          mins  57816;     Gait Training:           mins  53541;     Ultrasound:          mins  28048;    Electrical Stimulation:         mins  40279 ( );    Untimed:  Electrical Stimulation:         mins  16272 ( );  Mechanical Traction:         mins  32005;     Timed Treatment:   38   mins   Total Treatment:     38   mins  Nery Lozano PT    Electronically singed 2023      [place] PT license: [id number]  Physical Therapist

## 2023-12-14 ENCOUNTER — TREATMENT (OUTPATIENT)
Dept: PHYSICAL THERAPY | Facility: CLINIC | Age: 48
End: 2023-12-14
Payer: COMMERCIAL

## 2023-12-14 DIAGNOSIS — Z96.651 STATUS POST RIGHT PARTIAL KNEE REPLACEMENT: ICD-10-CM

## 2023-12-14 DIAGNOSIS — R26.9 GAIT DISTURBANCE: ICD-10-CM

## 2023-12-14 DIAGNOSIS — M25.561 ACUTE PAIN OF RIGHT KNEE: Primary | ICD-10-CM

## 2023-12-14 PROCEDURE — 97110 THERAPEUTIC EXERCISES: CPT | Performed by: PHYSICAL THERAPIST

## 2023-12-14 PROCEDURE — 97140 MANUAL THERAPY 1/> REGIONS: CPT | Performed by: PHYSICAL THERAPIST

## 2023-12-14 NOTE — PROGRESS NOTES
Physical Therapy Daily Treatment Note      Patient: Keerthi Pillai   : 1975  Referring practitioner: Ari Murguia MD  Date of Initial Visit: Type: THERAPY  Noted: 2023  Today's Date: 2023  Patient seen for 4 sessions           Subjective   Keerthi Pillai reports: right knee soreness on each side of knee, mostly medial knee      Objective   See Exercise, Manual, and Modality Logs for complete treatment.       Assessment & Plan       Assessment  Assessment details: Patient continues to tolerate progression of right knee strengthening/mobility.    Visit Diagnoses:    ICD-10-CM ICD-9-CM   1. Acute pain of right knee  M25.561 719.46   2. Gait disturbance  R26.9 781.2   3. Status post right partial knee replacement  Z96.651 V43.65       Progress per Plan of Care           Timed:  Manual Therapy:    8     mins  87453;  Therapeutic Exercise:    30     mins  31656;     Neuromuscular Dayna:        mins  05350;    Therapeutic Activity:          mins  40849;     Gait Training:           mins  79361;     Ultrasound:          mins  92786;    Electrical Stimulation:         mins  04727 ( );    Untimed:  Electrical Stimulation:         mins  46201 ( );  Mechanical Traction:         mins  89007;     Timed Treatment:   38   mins   Total Treatment:     38   mins  Nery Lozano PT    Electronically singed 2023      [place] PT license: [id number]  Physical Therapist

## 2023-12-18 ENCOUNTER — TREATMENT (OUTPATIENT)
Dept: PHYSICAL THERAPY | Facility: CLINIC | Age: 48
End: 2023-12-18
Payer: COMMERCIAL

## 2023-12-18 ENCOUNTER — TRANSCRIBE ORDERS (OUTPATIENT)
Dept: PHYSICAL THERAPY | Facility: CLINIC | Age: 48
End: 2023-12-18
Payer: COMMERCIAL

## 2023-12-18 DIAGNOSIS — Z96.651 STATUS POST RIGHT PARTIAL KNEE REPLACEMENT: Primary | ICD-10-CM

## 2023-12-18 DIAGNOSIS — R26.9 GAIT DISTURBANCE: ICD-10-CM

## 2023-12-18 DIAGNOSIS — Z96.651 STATUS POST RIGHT PARTIAL KNEE REPLACEMENT: ICD-10-CM

## 2023-12-18 DIAGNOSIS — M25.561 ACUTE PAIN OF RIGHT KNEE: Primary | ICD-10-CM

## 2023-12-18 NOTE — PROGRESS NOTES
"Physical Therapy Daily Treatment Note      Patient: Keerthi Pillai   : 1975  Referring practitioner: Ari Murguia MD  Date of Initial Visit: Type: THERAPY  Noted: 2023  Today's Date: 2023  Patient seen for 5 sessions           Subjective   Keerthi Pillai reports: right knee \"gives out\" at times with walking; pain 4/10      Objective   See Exercise, Manual, and Modality Logs for complete treatment.       Assessment & Plan       Assessment  Assessment details: Right knee AAROM flexion 88 degrees    Visit Diagnoses:    ICD-10-CM ICD-9-CM   1. Acute pain of right knee  M25.561 719.46   2. Gait disturbance  R26.9 781.2   3. Status post right partial knee replacement  Z96.651 V43.65       Progress per Plan of Care and Progress strengthening /stabilization /functional activity           Timed:  Manual Therapy:    8     mins  76655;  Therapeutic Exercise:    28     mins  22900;     Neuromuscular Dayna:        mins  18735;    Therapeutic Activity:     8     mins  62552;     Gait Training:           mins  99658;     Ultrasound:          mins  55935;    Electrical Stimulation:         mins  39474 ( );    Untimed:  Electrical Stimulation:         mins  71923 ( );  Mechanical Traction:         mins  63094;     Timed Treatment:   44   mins   Total Treatment:     44   mins  Nery Lozano PT    Electronically singed 2023      [place] PT license: [id number]  Physical Therapist  "

## 2023-12-20 ENCOUNTER — TREATMENT (OUTPATIENT)
Dept: PHYSICAL THERAPY | Facility: CLINIC | Age: 48
End: 2023-12-20
Payer: COMMERCIAL

## 2023-12-20 DIAGNOSIS — R26.9 GAIT DISTURBANCE: ICD-10-CM

## 2023-12-20 DIAGNOSIS — Z96.651 STATUS POST RIGHT PARTIAL KNEE REPLACEMENT: ICD-10-CM

## 2023-12-20 DIAGNOSIS — M25.561 ACUTE PAIN OF RIGHT KNEE: Primary | ICD-10-CM

## 2023-12-20 NOTE — PROGRESS NOTES
Physical Therapy Daily Treatment Note      Patient: Keerthi Pillai   : 1975  Referring practitioner: Ari Murguia MD  Date of Initial Visit: Type: THERAPY  Noted: 2023  Today's Date: 2023  Patient seen for 6 sessions           Subjective   Keerthi Pillai reports: right knee pain 4/10 with pain medication      Objective   See Exercise, Manual, and Modality Logs for complete treatment.       Assessment & Plan       Assessment  Assessment details: Right knee AAROM 0-92 degrees    Visit Diagnoses:    ICD-10-CM ICD-9-CM   1. Acute pain of right knee  M25.561 719.46   2. Gait disturbance  R26.9 781.2   3. Status post right partial knee replacement  Z96.651 V43.65       Progress per Plan of Care           Timed:  Manual Therapy:    8     mins  72677;  Therapeutic Exercise:    20     mins  17873;     Neuromuscular Dayna:        mins  24437;    Therapeutic Activity:     10     mins  22391;     Gait Training:           mins  46417;     Ultrasound:          mins  98860;    Electrical Stimulation:         mins  49342 ( );    Untimed:  Electrical Stimulation:         mins  84408 ( );  Mechanical Traction:         mins  03378;     Timed Treatment:   38   mins   Total Treatment:     38   mins  Nery Lozano PT    Electronically singed 2023      [place] PT license: [id number]  Physical Therapist

## 2023-12-22 ENCOUNTER — TRANSCRIBE ORDERS (OUTPATIENT)
Dept: PHYSICAL THERAPY | Facility: CLINIC | Age: 48
End: 2023-12-22
Payer: COMMERCIAL

## 2023-12-22 ENCOUNTER — TREATMENT (OUTPATIENT)
Dept: PHYSICAL THERAPY | Facility: CLINIC | Age: 48
End: 2023-12-22
Payer: COMMERCIAL

## 2023-12-22 DIAGNOSIS — M25.561 ACUTE PAIN OF RIGHT KNEE: Primary | ICD-10-CM

## 2023-12-22 DIAGNOSIS — Z96.651 STATUS POST RIGHT PARTIAL KNEE REPLACEMENT: ICD-10-CM

## 2023-12-22 DIAGNOSIS — R26.9 GAIT DISTURBANCE: ICD-10-CM

## 2023-12-22 NOTE — PROGRESS NOTES
Physical Therapy Daily Treatment Note      Patient: Keerthi Pillai   : 1975  Referring practitioner: Ari Murguia MD  Date of Initial Visit: Type: THERAPY  Noted: 2023  Today's Date: 2023  Patient seen for 7 sessions           Subjective   Keerthi Pillai reports: right knee feeling less stiff secondary to taking warm shower prior to therapy to loosen up knee.       Objective   See Exercise, Manual, and Modality Logs for complete treatment.       Assessment & Plan       Assessment  Assessment details: Right knee flexion PROM 98 degrees in sitting.     Visit Diagnoses:    ICD-10-CM ICD-9-CM   1. Acute pain of right knee  M25.561 719.46   2. Gait disturbance  R26.9 781.2   3. Status post right partial knee replacement  Z96.651 V43.65       Progress per Plan of Care and Progress strengthening /stabilization /functional activity           Timed:  Manual Therapy:    8     mins  47667;  Therapeutic Exercise:    20     mins  89583;     Neuromuscular Dayna:        mins  36922;    Therapeutic Activity:     10     mins  45438;     Gait Training:           mins  18450;     Ultrasound:          mins  59012;    Electrical Stimulation:         mins  49769 ( );    Untimed:  Electrical Stimulation:         mins  55626 ( );  Mechanical Traction:         mins  48116;     Timed Treatment:   38   mins   Total Treatment:     38   mins  Nery Lozano PT    Electronically singed 2023      [place] PT license: [id number]  Physical Therapist

## 2023-12-26 ENCOUNTER — TREATMENT (OUTPATIENT)
Dept: PHYSICAL THERAPY | Facility: CLINIC | Age: 48
End: 2023-12-26
Payer: COMMERCIAL

## 2023-12-26 DIAGNOSIS — Z96.651 STATUS POST RIGHT PARTIAL KNEE REPLACEMENT: ICD-10-CM

## 2023-12-26 DIAGNOSIS — R26.9 GAIT DISTURBANCE: ICD-10-CM

## 2023-12-26 DIAGNOSIS — M25.561 ACUTE PAIN OF RIGHT KNEE: Primary | ICD-10-CM

## 2023-12-26 NOTE — PROGRESS NOTES
Physical Therapy Daily Treatment Note      Patient: Keerthi Pillai   : 1975  Referring practitioner: Ari Murguia MD  Date of Initial Visit: Type: THERAPY  Noted: 2023  Today's Date: 2023  Patient seen for 8 sessions           Subjective   Keerthi Pillai reports: right knee pain 2/10      Objective   See Exercise, Manual, and Modality Logs for complete treatment.       Assessment & Plan       Assessment  Assessment details: Right knee flexion AAROM 94 degrees in supine.  Patient tolerated progression of right knee strengthening without increase symptoms.       Visit Diagnoses:    ICD-10-CM ICD-9-CM   1. Acute pain of right knee  M25.561 719.46   2. Gait disturbance  R26.9 781.2   3. Status post right partial knee replacement  Z96.651 V43.65       Progress per Plan of Care and Progress strengthening /stabilization /functional activity           Timed:  Manual Therapy:    8     mins  32036;  Therapeutic Exercise:    22     mins  96862;     Neuromuscular Dayna:        mins  14988;    Therapeutic Activity:     10     mins  86749;     Gait Training:           mins  74619;     Ultrasound:          mins  68244;    Electrical Stimulation:         mins  10796 ( );    Untimed:  Electrical Stimulation:         mins  57485 ( );  Mechanical Traction:         mins  38402;     Timed Treatment:   40   mins   Total Treatment:     40   mins  Nery Lozano PT    Electronically singed 2023      [place] PT license: [id number]  Physical Therapist

## 2023-12-28 ENCOUNTER — TREATMENT (OUTPATIENT)
Dept: PHYSICAL THERAPY | Facility: CLINIC | Age: 48
End: 2023-12-28
Payer: COMMERCIAL

## 2023-12-28 DIAGNOSIS — R26.9 GAIT DISTURBANCE: ICD-10-CM

## 2023-12-28 DIAGNOSIS — M25.561 ACUTE PAIN OF RIGHT KNEE: Primary | ICD-10-CM

## 2023-12-28 DIAGNOSIS — Z96.651 STATUS POST RIGHT PARTIAL KNEE REPLACEMENT: ICD-10-CM

## 2023-12-28 NOTE — PROGRESS NOTES
Physical Therapy Daily Treatment Note      Patient: Keerthi Pillai   : 1975  Referring practitioner: Ari Murguia MD  Date of Initial Visit: Type: THERAPY  Noted: 2023  Today's Date: 2023  Patient seen for 9 sessions           Subjective   Keerthi Pillai reports: right knee pain 3/10 with pain medication.       Objective   See Exercise, Manual, and Modality Logs for complete treatment.       Assessment & Plan       Assessment  Assessment details: PROM Right knee 100 degrees of flexion in sitting    Visit Diagnoses:    ICD-10-CM ICD-9-CM   1. Acute pain of right knee  M25.561 719.46   2. Gait disturbance  R26.9 781.2   3. Status post right partial knee replacement  Z96.651 V43.65       Progress per Plan of Care           Timed:  Manual Therapy:    10     mins  74426;  Therapeutic Exercise:    20     mins  68071;     Neuromuscular Dayna:        mins  03613;    Therapeutic Activity:     8     mins  73780;     Gait Training:           mins  69669;     Ultrasound:          mins  68972;    Electrical Stimulation:         mins  15620 ( );    Untimed:  Electrical Stimulation:         mins  62205 ( );  Mechanical Traction:         mins  30789;     Timed Treatment:   38   mins   Total Treatment:     38   mins  Nery Lozano PT    Electronically singed 2023      [place] PT license: [id number]  Physical Therapist

## 2024-01-02 ENCOUNTER — TREATMENT (OUTPATIENT)
Dept: PHYSICAL THERAPY | Facility: CLINIC | Age: 49
End: 2024-01-02
Payer: COMMERCIAL

## 2024-01-02 DIAGNOSIS — R26.9 GAIT DISTURBANCE: ICD-10-CM

## 2024-01-02 DIAGNOSIS — Z96.651 STATUS POST RIGHT PARTIAL KNEE REPLACEMENT: ICD-10-CM

## 2024-01-02 DIAGNOSIS — M25.561 ACUTE PAIN OF RIGHT KNEE: Primary | ICD-10-CM

## 2024-01-02 PROCEDURE — 97140 MANUAL THERAPY 1/> REGIONS: CPT | Performed by: PHYSICAL THERAPIST

## 2024-01-02 PROCEDURE — 97110 THERAPEUTIC EXERCISES: CPT | Performed by: PHYSICAL THERAPIST

## 2024-01-02 PROCEDURE — 97530 THERAPEUTIC ACTIVITIES: CPT | Performed by: PHYSICAL THERAPIST

## 2024-01-02 NOTE — PROGRESS NOTES
Physical Therapy Daily Treatment Note      Patient: Keerthi Pillai   : 1975  Referring practitioner: Ari Murguia MD  Date of Initial Visit: Type: THERAPY  Noted: 2023  Today's Date: 2024  Patient seen for 10 sessions           Subjective   Keerthi Pillai reports: right knee pain 1/10      Objective   See Exercise, Manual, and Modality Logs for complete treatment.       Assessment & Plan       Assessment  Assessment details: Right knee AAROM knee flexion to 107 degrees        Visit Diagnoses:    ICD-10-CM ICD-9-CM   1. Acute pain of right knee  M25.561 719.46   2. Gait disturbance  R26.9 781.2   3. Status post right partial knee replacement  Z96.651 V43.65       Progress per Plan of Care and Progress strengthening /stabilization /functional activity           Timed:  Manual Therapy:    8     mins  16019;  Therapeutic Exercise:    22     mins  20901;     Neuromuscular Dayna:        mins  18459;    Therapeutic Activity:     8     mins  49770;     Gait Training:           mins  80714;     Ultrasound:          mins  70243;    Electrical Stimulation:         mins  56306 ( );    Untimed:  Electrical Stimulation:         mins  12690 ( );  Mechanical Traction:         mins  10226;     Timed Treatment:   38   mins   Total Treatment:     38   mins  Nery Lozano PT    Electronically singed 2024      [place] PT license: [id number]  Physical Therapist

## 2024-01-04 ENCOUNTER — TREATMENT (OUTPATIENT)
Dept: PHYSICAL THERAPY | Facility: CLINIC | Age: 49
End: 2024-01-04
Payer: COMMERCIAL

## 2024-01-04 DIAGNOSIS — Z96.651 STATUS POST RIGHT PARTIAL KNEE REPLACEMENT: ICD-10-CM

## 2024-01-04 DIAGNOSIS — R26.9 GAIT DISTURBANCE: ICD-10-CM

## 2024-01-04 DIAGNOSIS — M25.561 ACUTE PAIN OF RIGHT KNEE: Primary | ICD-10-CM

## 2024-01-04 NOTE — PROGRESS NOTES
Physical Therapy Daily Treatment Note      Patient: Keerthi Pillai   : 1975  Referring practitioner: Ari Murguia MD  Date of Initial Visit: Type: THERAPY  Noted: 2023  Today's Date: 2024  Patient seen for 11 sessions           Subjective   Keerthi Pillai reports: right knee stiffness      Objective   See Exercise, Manual, and Modality Logs for complete treatment.       Assessment & Plan       Assessment  Assessment details: Patient still demonstrates decrease right knee flexion range of motion.         Visit Diagnoses:    ICD-10-CM ICD-9-CM   1. Acute pain of right knee  M25.561 719.46   2. Gait disturbance  R26.9 781.2   3. Status post right partial knee replacement  Z96.651 V43.65       Progress per Plan of Care and Progress strengthening /stabilization /functional activity           Timed:  Manual Therapy:    8     mins  28873;  Therapeutic Exercise:    22     mins  73434;     Neuromuscular Dayna:        mins  23485;    Therapeutic Activity:     8     mins  71898;     Gait Training:           mins  39398;     Ultrasound:          mins  85503;    Electrical Stimulation:         mins  57031 ( );    Untimed:  Electrical Stimulation:         mins  73277 ( );  Mechanical Traction:         mins  70833;     Timed Treatment:   38   mins   Total Treatment:     38   mins  Nery Lozano PT    Electronically singed 2024      [place] PT license: [id number]  Physical Therapist

## 2024-01-08 ENCOUNTER — TREATMENT (OUTPATIENT)
Dept: PHYSICAL THERAPY | Facility: CLINIC | Age: 49
End: 2024-01-08
Payer: COMMERCIAL

## 2024-01-08 DIAGNOSIS — M25.561 ACUTE PAIN OF RIGHT KNEE: Primary | ICD-10-CM

## 2024-01-08 DIAGNOSIS — Z96.651 STATUS POST RIGHT PARTIAL KNEE REPLACEMENT: ICD-10-CM

## 2024-01-08 DIAGNOSIS — R26.9 GAIT DISTURBANCE: ICD-10-CM

## 2024-01-08 PROCEDURE — 97530 THERAPEUTIC ACTIVITIES: CPT | Performed by: PHYSICAL THERAPIST

## 2024-01-08 PROCEDURE — 97140 MANUAL THERAPY 1/> REGIONS: CPT | Performed by: PHYSICAL THERAPIST

## 2024-01-08 PROCEDURE — 97110 THERAPEUTIC EXERCISES: CPT | Performed by: PHYSICAL THERAPIST

## 2024-01-08 NOTE — PROGRESS NOTES
Physical Therapy Progress Note  [address] [address] [facility] [address], [address], [place] [address]      Patient: Keerthi Pillai   : 1975  Referring practitioner: Ari Murguia MD  Date of Initial Visit: Type: THERAPY  Noted: 2023  Today's Date: 2024  Patient seen for 12 sessions           Subjective   Keerthi Pillai reports: right knee pain 2/10  Subjective Questionnaire: LEFS: =54% limitation improved from initial score =99% limitation       Objective          Active Range of Motion     Right Knee   Flexion: 112 degrees   Extension: 0 degrees     Passive Range of Motion     Right Knee   Extension: with pain    Strength/Myotome Testing     Right Knee   Flexion: 3  Extension: 3  Quadriceps contraction: fair    Right Knee Flexibility Comments:   Tight hamstrings and gastroc       See Exercise, Manual, and Modality Logs for complete treatment.       Assessment & Plan       Assessment  Impairments: abnormal gait, abnormal muscle firing, abnormal or restricted ROM, activity intolerance, impaired balance, impaired physical strength, lacks appropriate home exercise program, pain with function, safety issue and weight-bearing intolerance   Functional limitations: carrying objects, lifting, walking, uncomfortable because of pain, moving in bed, sitting, standing, stooping and unable to perform repetitive tasks   Assessment details: Patient has made good progress with therapy with improved right knee strength and range of motion, but still demonstrates deficits. Patient would benefit from continued skilled physical therapy to improve patient's functional mobility/independence.   Prognosis: good    Goals  Plan Goals: 1. The patient has limited ROM of the right knee.   LTG 1: 12weeks:  The patient will demonstrate 0 to 120 degrees of ROM for the right knee in order to allow patient to normalize gait/negotiate stairs.   STATUS:  progressing  STG 1a: 6 weeks:  The patient will demonstrate 0 to 110 degrees of ROM for the  right knee.   STATUS: Met     2. The patient has limited strength of the right knee.   LTG 2: 12 weeks: The patient will demonstrate 4+/5 strength for right knee flexion and extension in order to allow patient improved joint stability.   STATUS:  ongoing  STG 2a: 6 weeks: The patient will demonstrate 3+/5 strength for right knee flexion and extension   STATUS: Progressing      3. The patient has gait dysfunction.   LTG 3: 8 weeks:  The patient will ambulate without assistive device, independently, for community distances with minimal limp to the right lower extremity in order to improve mobility and allow patient to perform activities such as grocery shopping with greater ease.   STATUS:  Met  STG 3a: The patient will be independent in HEP.   STATUS:  Met and progressing as tolerated      4. The patient complains of right knee pain.   LTG 4: 12 weeks:  The patient will report a pain rating of 2/10 or better in order to improve tolerance to activities of daily living and improve sleep quality.   STATUS:  ongoing  STG 4a: 6weeks:  The patient will report a pain rating of 4/10 or better.   STATUS:  Met      5. Mobility: Walking/Moving Around Functional Limitation     LTG 5: 12weeks:  The patient will demonstrate 25% limitation by achieving a score of 60/80 on the Lower Extremity Functional Scale.   STATUS:  ongoing  STG 5a: 6 weeks:  The patient will demonstrate 50% limitation by achieving a score of 40/80 on the Lower Extremity Functional Scale.     STATUS:  progressing      Plan  Therapy options: will be seen for skilled therapy services  Planned modality interventions: cryotherapy and TENS  Planned therapy interventions: manual therapy, soft tissue mobilization, strengthening, stretching, therapeutic activities, joint mobilization, home exercise program, gait training, functional ROM exercises, flexibility and balance/weight-bearing training  Frequency: 3x week  Duration in weeks: 8  Treatment plan discussed with:  patient  Plan details: 3xwk for 1 wks then 2xwk for 6 wks.       Visit Diagnoses:    ICD-10-CM ICD-9-CM   1. Acute pain of right knee  M25.561 719.46   2. Gait disturbance  R26.9 781.2   3. Status post right partial knee replacement  Z96.651 V43.65       Progress per Plan of Care and Progress strengthening /stabilization /functional activity           Timed:  Manual Therapy:    8     mins  57517;  Therapeutic Exercise:    20     mins  96529;     Neuromuscular Dayna:        mins  13232;    Therapeutic Activity:     10     mins  39636;     Gait Training:           mins  93729;     Ultrasound:          mins  42095;    Electrical Stimulation:         mins  66033 ( );    Untimed:  Electrical Stimulation:         mins  26031 ( );  Mechanical Traction:         mins  90909;     Timed Treatment:   38   mins   Total Treatment:     38   mins  Nery Lozano PT    Electronically singed 1/8/2024      [place] PT license: [id number]  Physical Therapist

## 2024-01-10 ENCOUNTER — TREATMENT (OUTPATIENT)
Dept: PHYSICAL THERAPY | Facility: CLINIC | Age: 49
End: 2024-01-10
Payer: COMMERCIAL

## 2024-01-10 DIAGNOSIS — Z96.651 STATUS POST RIGHT PARTIAL KNEE REPLACEMENT: ICD-10-CM

## 2024-01-10 DIAGNOSIS — M25.561 ACUTE PAIN OF RIGHT KNEE: Primary | ICD-10-CM

## 2024-01-10 DIAGNOSIS — R26.9 GAIT DISTURBANCE: ICD-10-CM

## 2024-01-10 NOTE — PROGRESS NOTES
Physical Therapy Daily Treatment Note      Patient: Keerthi Pillai   : 1975  Referring practitioner: Ari Murguia MD  Date of Initial Visit: Type: THERAPY  Noted: 2023  Today's Date: 1/10/2024  Patient seen for 13 sessions           Subjective   Keerthi Pillai reports: had some increase in right knee pain (anterior/patellar tendon) when trying to push heavy barn gate close.      Objective   See Exercise, Manual, and Modality Logs for complete treatment.       Assessment & Plan       Assessment  Assessment details: Patient tolerated progression of hip and knee strengthening to improve patient's ability to perform functional activity.     Visit Diagnoses:    ICD-10-CM ICD-9-CM   1. Acute pain of right knee  M25.561 719.46   2. Gait disturbance  R26.9 781.2   3. Status post right partial knee replacement  Z96.651 V43.65       Progress per Plan of Care and Progress strengthening /stabilization /functional activity           Timed:  Manual Therapy:    8     mins  14499;  Therapeutic Exercise:    22     mins  53688;     Neuromuscular Dayna:        mins  48891;    Therapeutic Activity:     10     mins  07251;     Gait Training:           mins  81597;     Ultrasound:          mins  08825;    Electrical Stimulation:         mins  85008 ( );    Untimed:  Electrical Stimulation:         mins  00619 ( );  Mechanical Traction:         mins  20021;     Timed Treatment:   40   mins   Total Treatment:     40   mins  Nery Lozano PT    Electronically singed 1/10/2024      [place] PT license: [id number]  Physical Therapist

## 2024-01-12 ENCOUNTER — TREATMENT (OUTPATIENT)
Dept: PHYSICAL THERAPY | Facility: CLINIC | Age: 49
End: 2024-01-12
Payer: COMMERCIAL

## 2024-01-12 DIAGNOSIS — Z96.651 STATUS POST RIGHT PARTIAL KNEE REPLACEMENT: ICD-10-CM

## 2024-01-12 DIAGNOSIS — R26.9 GAIT DISTURBANCE: ICD-10-CM

## 2024-01-12 DIAGNOSIS — M25.561 ACUTE PAIN OF RIGHT KNEE: Primary | ICD-10-CM

## 2024-01-12 NOTE — PROGRESS NOTES
Physical Therapy Daily Treatment Note      Patient: Keerthi Pillai   : 1975  Referring practitioner: Ari Murguia MD  Date of Initial Visit: Type: THERAPY  Noted: 2023  Today's Date: 2024  Patient seen for 14 sessions           Subjective   Keerthi Pillai reports: right knee stiffness      Objective   See Exercise, Manual, and Modality Logs for complete treatment.       Assessment & Plan       Assessment  Assessment details: Right knee PROM 113 degrees of flexion    Visit Diagnoses:    ICD-10-CM ICD-9-CM   1. Acute pain of right knee  M25.561 719.46   2. Gait disturbance  R26.9 781.2   3. Status post right partial knee replacement  Z96.651 V43.65       Progress per Plan of Care and Progress strengthening /stabilization /functional activity           Timed:  Manual Therapy:    8     mins  57392;  Therapeutic Exercise:    22     mins  52727;     Neuromuscular Dayna:        mins  81963;    Therapeutic Activity:     10     mins  49786;     Gait Training:           mins  70686;     Ultrasound:          mins  04833;    Electrical Stimulation:         mins  47794 ( );    Untimed:  Electrical Stimulation:         mins  07122 ( );  Mechanical Traction:         mins  00433;     Timed Treatment:   40   mins   Total Treatment:     40   mins  Nery Lozano PT    Electronically singed 2024      [place] PT license: [id number]  Physical Therapist

## 2024-02-13 RX ORDER — TRAZODONE HYDROCHLORIDE 50 MG/1
50 TABLET ORAL
Qty: 90 TABLET | Refills: 1 | Status: SHIPPED | OUTPATIENT
Start: 2024-02-13

## 2024-02-20 ENCOUNTER — OFFICE VISIT (OUTPATIENT)
Dept: INTERNAL MEDICINE | Facility: CLINIC | Age: 49
End: 2024-02-20
Payer: COMMERCIAL

## 2024-02-20 VITALS
HEIGHT: 61 IN | RESPIRATION RATE: 18 BRPM | WEIGHT: 164.2 LBS | OXYGEN SATURATION: 99 % | HEART RATE: 66 BPM | BODY MASS INDEX: 31 KG/M2 | SYSTOLIC BLOOD PRESSURE: 112 MMHG | DIASTOLIC BLOOD PRESSURE: 70 MMHG | TEMPERATURE: 98 F

## 2024-02-20 DIAGNOSIS — Z14.8 HEMOCHROMATOSIS CARRIER: ICD-10-CM

## 2024-02-20 DIAGNOSIS — F33.0 MAJOR DEPRESSIVE DISORDER, RECURRENT, MILD: Primary | ICD-10-CM

## 2024-02-20 DIAGNOSIS — R53.83 OTHER FATIGUE: ICD-10-CM

## 2024-02-20 DIAGNOSIS — R53.82 CHRONIC FATIGUE: ICD-10-CM

## 2024-02-20 DIAGNOSIS — Z96.651 STATUS POST RIGHT PARTIAL KNEE REPLACEMENT: ICD-10-CM

## 2024-02-20 DIAGNOSIS — Z78.0 MENOPAUSE: ICD-10-CM

## 2024-02-20 LAB
ALBUMIN SERPL-MCNC: 4.3 G/DL (ref 3.5–5.2)
ALBUMIN/GLOB SERPL: 1.8 G/DL
ALP SERPL-CCNC: 62 U/L (ref 39–117)
ALT SERPL W P-5'-P-CCNC: 16 U/L (ref 1–33)
ANION GAP SERPL CALCULATED.3IONS-SCNC: 12 MMOL/L (ref 5–15)
AST SERPL-CCNC: 19 U/L (ref 1–32)
BASOPHILS # BLD AUTO: 0.08 10*3/MM3 (ref 0–0.2)
BASOPHILS NFR BLD AUTO: 1.8 % (ref 0–1.5)
BILIRUB SERPL-MCNC: 0.4 MG/DL (ref 0–1.2)
BUN SERPL-MCNC: 12 MG/DL (ref 6–20)
BUN/CREAT SERPL: 16.7 (ref 7–25)
CALCIUM SPEC-SCNC: 9.2 MG/DL (ref 8.6–10.5)
CHLORIDE SERPL-SCNC: 104 MMOL/L (ref 98–107)
CHOLEST SERPL-MCNC: 168 MG/DL (ref 0–200)
CO2 SERPL-SCNC: 26 MMOL/L (ref 22–29)
CREAT SERPL-MCNC: 0.72 MG/DL (ref 0.57–1)
DEPRECATED RDW RBC AUTO: 39.6 FL (ref 37–54)
EGFRCR SERPLBLD CKD-EPI 2021: 103.3 ML/MIN/1.73
EOSINOPHIL # BLD AUTO: 0.13 10*3/MM3 (ref 0–0.4)
EOSINOPHIL NFR BLD AUTO: 2.9 % (ref 0.3–6.2)
ERYTHROCYTE [DISTWIDTH] IN BLOOD BY AUTOMATED COUNT: 11.6 % (ref 12.3–15.4)
FERRITIN SERPL-MCNC: 718 NG/ML (ref 13–150)
GLOBULIN UR ELPH-MCNC: 2.4 GM/DL
GLUCOSE SERPL-MCNC: 95 MG/DL (ref 65–99)
HCT VFR BLD AUTO: 36.5 % (ref 34–46.6)
HDLC SERPL-MCNC: 34 MG/DL (ref 40–60)
HGB BLD-MCNC: 12.8 G/DL (ref 12–15.9)
IMM GRANULOCYTES # BLD AUTO: 0.01 10*3/MM3 (ref 0–0.05)
IMM GRANULOCYTES NFR BLD AUTO: 0.2 % (ref 0–0.5)
IRON 24H UR-MRATE: 210 MCG/DL (ref 37–145)
IRON SATN MFR SERPL: 83 % (ref 20–50)
LDLC SERPL CALC-MCNC: 114 MG/DL (ref 0–100)
LDLC/HDLC SERPL: 3.29 {RATIO}
LYMPHOCYTES # BLD AUTO: 1.3 10*3/MM3 (ref 0.7–3.1)
LYMPHOCYTES NFR BLD AUTO: 28.6 % (ref 19.6–45.3)
MCH RBC QN AUTO: 33.5 PG (ref 26.6–33)
MCHC RBC AUTO-ENTMCNC: 35.1 G/DL (ref 31.5–35.7)
MCV RBC AUTO: 95.5 FL (ref 79–97)
MONOCYTES # BLD AUTO: 0.51 10*3/MM3 (ref 0.1–0.9)
MONOCYTES NFR BLD AUTO: 11.2 % (ref 5–12)
NEUTROPHILS NFR BLD AUTO: 2.52 10*3/MM3 (ref 1.7–7)
NEUTROPHILS NFR BLD AUTO: 55.3 % (ref 42.7–76)
NRBC BLD AUTO-RTO: 0 /100 WBC (ref 0–0.2)
PLATELET # BLD AUTO: 226 10*3/MM3 (ref 140–450)
PMV BLD AUTO: 11.9 FL (ref 6–12)
POTASSIUM SERPL-SCNC: 4.2 MMOL/L (ref 3.5–5.2)
PROT SERPL-MCNC: 6.7 G/DL (ref 6–8.5)
RBC # BLD AUTO: 3.82 10*6/MM3 (ref 3.77–5.28)
SODIUM SERPL-SCNC: 142 MMOL/L (ref 136–145)
TIBC SERPL-MCNC: 253 MCG/DL (ref 298–536)
TRANSFERRIN SERPL-MCNC: 170 MG/DL (ref 200–360)
TRIGL SERPL-MCNC: 110 MG/DL (ref 0–150)
TSH SERPL DL<=0.05 MIU/L-ACNC: 1.02 UIU/ML (ref 0.27–4.2)
VLDLC SERPL-MCNC: 20 MG/DL (ref 5–40)
WBC NRBC COR # BLD AUTO: 4.55 10*3/MM3 (ref 3.4–10.8)

## 2024-02-20 PROCEDURE — 84466 ASSAY OF TRANSFERRIN: CPT | Performed by: INTERNAL MEDICINE

## 2024-02-20 PROCEDURE — 80050 GENERAL HEALTH PANEL: CPT | Performed by: INTERNAL MEDICINE

## 2024-02-20 PROCEDURE — 83540 ASSAY OF IRON: CPT | Performed by: INTERNAL MEDICINE

## 2024-02-20 PROCEDURE — 82728 ASSAY OF FERRITIN: CPT | Performed by: INTERNAL MEDICINE

## 2024-02-20 PROCEDURE — 80061 LIPID PANEL: CPT | Performed by: INTERNAL MEDICINE

## 2024-02-20 NOTE — PROGRESS NOTES
"Chief Complaint  Depression (48 y/o female is here today for 2 month follow up, patient depression medication was increase. Patient states she feels like its working better. )    Subjective          Keerthi Pillai is a 49 y.o. female who presents to [facility]     States that she is feeling much better since her knee surgery  She is working with PT and following with ortho    Feels like her anxiety is less than it was  Only had 3-4 \"bad\" days after surgery    Not noticing much irritability feels like the medicine helped    In an [employer] program called [employer]  Has a health   Working on meal prepping and different foods to eat  They start with restrictive behaviors and then add foods back in to see how she has inflammation    She has lost 5lbs since October    Doing well with her hormone replacement    Does have capsular contracture on her left side  She did follow up with a plastic surgeon in [place]    Objective   Vital Signs:   Vitals:    02/20/24 0914   BP: 112/70   BP Location: Left arm   Patient Position: Sitting   Cuff Size: Adult   Pulse: 66   Resp: 18   Temp: 98 °F (36.7 °C)   TempSrc: Temporal   SpO2: 99%   Weight: 74.5 kg (164 lb 3.2 oz)   Height: 154.9 cm (60.98\")     Body mass index is 31.04 kg/m².    Wt Readings from Last 3 Encounters:   02/20/24 74.5 kg (164 lb 3.2 oz)   12/01/23 75.6 kg (166 lb 9.6 oz)   10/11/23 76.8 kg (169 lb 5 oz)     BP Readings from Last 3 Encounters:   02/20/24 112/70   12/01/23 122/90   10/11/23 135/57       Health Maintenance   Topic Date Due    COVID-19 Vaccine (4 - 2023-24 season) 03/01/2024 (Originally 9/1/2023)    ANNUAL PHYSICAL  03/01/2024 (Originally 6/15/2021)    INFLUENZA VACCINE  03/31/2024 (Originally 8/1/2023)    PAP SMEAR  05/03/2024    BMI FOLLOWUP  02/20/2025    COLORECTAL CANCER SCREENING  06/07/2028    TDAP/TD VACCINES (2 - Td or Tdap) 01/27/2033    HEPATITIS C SCREENING  Completed    Pneumococcal " Vaccine 0-64  Aged Out       Physical Exam  Vitals reviewed.   Constitutional:       Appearance: Normal appearance. She is well-developed.   HENT:      Head: Normocephalic and atraumatic.      Right Ear: External ear normal.      Left Ear: External ear normal.   Eyes:      Conjunctiva/sclera: Conjunctivae normal.      Pupils: Pupils are equal, round, and reactive to light.   Cardiovascular:      Rate and Rhythm: Normal rate and regular rhythm.      Heart sounds: No murmur heard.     No friction rub. No gallop.   Pulmonary:      Effort: Pulmonary effort is normal.      Breath sounds: Normal breath sounds. No wheezing or rhonchi.   Skin:     General: Skin is warm and dry.   Neurological:      Mental Status: She is alert and oriented to person, place, and time.   Psychiatric:         Mood and Affect: Affect normal.         Behavior: Behavior normal.         Thought Content: Thought content normal.          Result Review :     The following data was reviewed by: Jennifer Ocasio MD on 02/20/2024:      Procedures          Assessment & Plan  Major depressive disorder, recurrent, mild   doing great, cont to montr  Hemochromatosis carrier  Will check iron today  Chronic fatigue  Check labs and treat as needed  Other fatigue    Menopause  Tolerating well, no new breast lumps or lesions  Status post right partial knee replacement  Doing great, cont PT    Orders Placed This Encounter   Procedures    Comprehensive Metabolic Panel    TSH    Lipid Panel    Ferritin    Iron and TIBC    CBC Auto Differential    CBC & Differential             BMI is >= 30 and <35. (Class 1 Obesity). The following options were offered after discussion;: exercise counseling/recommendations and nutrition counseling/recommendations         FOLLOW UP  Return in about 4 months (around 6/20/2024).  Patient was given instructions and counseling regarding her condition or for health maintenance advice. Please see specific information pulled into the AVS  if appropriate.       Jennifer Ocasio MD  02/20/24  11:41 EST    CURRENT & DISCONTINUED MEDICATIONS  Current Outpatient Medications   Medication Instructions    celecoxib (CeleBREX) 200 MG capsule TAKE ONE CAPSULE BY MOUTH EVERY DAY    desvenlafaxine (PRISTIQ) 100 mg, Oral, Daily    estradiol-norethindrone (ACTIVELLA) 1-0.5 MG per tablet 1 tablet, Oral, Daily    fexofenadine (ALLEGRA ALLERGY) 180 mg, Oral, Daily    fluticasone (FLONASE) 50 MCG/ACT nasal spray 2 sprays, Nasal, Daily    lisinopril (PRINIVIL,ZESTRIL) 20 MG tablet TAKE ONE TABLET BY MOUTH EVERY DAY    mupirocin (BACTROBAN) 2 % ointment     tiZANidine (ZANAFLEX) 2 mg, Oral, Nightly PRN    traZODone (DESYREL) 50 mg, Oral, Every Night at Bedtime    triamcinolone (KENALOG) 0.1 % ointment Apply topically to hands twice a day as needed up to 3 weeks. Stop for 1 week and repeat only as needed       Medications Discontinued During This Encounter   Medication Reason    Eliquis 2.5 MG tablet tablet     HYDROcodone-acetaminophen (NORCO)  MG per tablet     traMADol (ULTRAM) 50 MG tablet     promethazine (PHENERGAN) 25 MG tablet

## 2024-03-24 RX ORDER — TIZANIDINE 2 MG/1
2 TABLET ORAL NIGHTLY PRN
Qty: 90 TABLET | Refills: 1 | Status: SHIPPED | OUTPATIENT
Start: 2024-03-24

## 2024-03-24 RX ORDER — FEXOFENADINE HYDROCHLORIDE 180 MG/1
180 TABLET, FILM COATED ORAL DAILY
Qty: 90 TABLET | Refills: 1 | Status: SHIPPED | OUTPATIENT
Start: 2024-03-24

## 2024-04-22 RX ORDER — ESTRADIOL AND NORETHINDRONE ACETATE 1; .5 MG/1; MG/1
1 TABLET ORAL DAILY
Qty: 84 TABLET | Refills: 3 | Status: SHIPPED | OUTPATIENT
Start: 2024-04-22

## 2024-04-22 RX ORDER — LISINOPRIL 20 MG/1
TABLET ORAL
Qty: 90 TABLET | Refills: 3 | Status: SHIPPED | OUTPATIENT
Start: 2024-04-22

## 2024-04-30 ENCOUNTER — OFFICE VISIT (OUTPATIENT)
Dept: INTERNAL MEDICINE | Facility: CLINIC | Age: 49
End: 2024-04-30
Payer: COMMERCIAL

## 2024-04-30 VITALS
HEIGHT: 61 IN | BODY MASS INDEX: 31.49 KG/M2 | DIASTOLIC BLOOD PRESSURE: 76 MMHG | SYSTOLIC BLOOD PRESSURE: 118 MMHG | OXYGEN SATURATION: 99 % | HEART RATE: 74 BPM | WEIGHT: 166.8 LBS | TEMPERATURE: 99.5 F

## 2024-04-30 DIAGNOSIS — E66.9 CLASS 1 OBESITY: Primary | ICD-10-CM

## 2024-04-30 DIAGNOSIS — Z71.3 WEIGHT LOSS COUNSELING, ENCOUNTER FOR: ICD-10-CM

## 2024-04-30 PROCEDURE — 99214 OFFICE O/P EST MOD 30 MIN: CPT | Performed by: NURSE PRACTITIONER

## 2024-04-30 RX ORDER — SEMAGLUTIDE 0.25 MG/.5ML
0.25 INJECTION, SOLUTION SUBCUTANEOUS WEEKLY
Qty: 2 ML | Refills: 0 | Status: SHIPPED | OUTPATIENT
Start: 2024-04-30

## 2024-04-30 NOTE — PROGRESS NOTES
"Chief Complaint  Weight Loss (Pt present in office today to discuss weight loss medication.)    Subjective        Keerthi Pillai presents to [facility] for discussion regarding weight loss.    Patient reports weight gain over the last several years, primarily secondary to her right knee, had surgery on it earlier this year.  She wants to start exercising, but has to be very cautious when doing so, she wants to safely get some weight off, so she can get back to a better exercise routine.  Patient does watch her diet, she did a diet program through her insurance, however she was unable to advance and levels, so that was discontinued.  She wanted to look at medications that would be an option to help her.  She has used phentermine in the past, which was not tolerated, causing significant side effects.    Objective   Vital Signs:   /76 (BP Location: Left arm, Patient Position: Sitting, Cuff Size: Adult)   Pulse 74   Temp 99.5 °F (37.5 °C) (Temporal)   Ht 154.9 cm (60.98\")   Wt 75.7 kg (166 lb 12.8 oz)   SpO2 99%   BMI 31.54 kg/m²     Physical Exam  Vitals and nursing note reviewed.   Constitutional:       Appearance: Normal appearance. She is obese.   Cardiovascular:      Rate and Rhythm: Normal rate.   Pulmonary:      Effort: Pulmonary effort is normal.   Neurological:      Mental Status: She is alert.   Psychiatric:         Mood and Affect: Mood normal.         Thought Content: Thought content normal.        Result Review :  {The following data was reviewed by ANATOLIY Collado on 04/30/24                Diagnoses and all orders for this visit:    1. Class 1 obesity (Primary)  -     Semaglutide-Weight Management (Wegovy) 0.25 MG/0.5ML solution auto-injector; Inject 0.5 mL under the skin into the appropriate area as directed 1 (One) Time Per Week.  Dispense: 2 mL; Refill: 0  -     Semaglutide-Weight Management 0.5 MG/0.5ML solution auto-injector; Inject 0.5 mL under the skin " into the appropriate area as directed 1 (One) Time Per Week.  Dispense: 2 mL; Refill: 0    2. Weight loss counseling, encounter for    Long discussion with patient today regarding medications and recommendations for weight loss.  We discussed caloric deficit calculators, recommended calories for bare minimum needs, which she is familiar with.  We reviewed medications, including phentermine, and phentermine containing products like Qsymia, both which would be contraindicated due to significant side effects with medication previously.  She did not tolerate phentermine before.  Contrave, which is a combination medication with Wellbutrin and naltrexone, we do not recommend that as well, she is already on an antidepressant, which would likely not be.  Will with these medications.  We discussed GLP-1 medications, like Wegovy, which was prescribed today.  We discussed the difficulty in obtaining the medication, so we discussed getting medication in hand, and storing until additional doses can be sent so we can appropriately and safely ramp up.  Patient understands and agrees, she will follow-up with her PCP as scheduled in July.  Would recommend lab work at that time, and depending on when medications are received.    I spent 35 minutes caring for Keerthi on this date of service. This time includes time spent by me in the following activities:preparing for the visit, reviewing tests, obtaining and/or reviewing a separately obtained history, performing a medically appropriate examination and/or evaluation , counseling and educating the patient/family/caregiver, ordering medications, tests, or procedures, and documenting information in the medical record  Follow Up   No follow-ups on file.  Patient was given instructions and counseling regarding her condition or for health maintenance advice. Please see specific information pulled into the AVS if appropriate.     Jasper Beavers, APRN  4/30/2024  This note was electronically  signed.

## 2024-05-01 RX ORDER — CELECOXIB 200 MG/1
200 CAPSULE ORAL DAILY
Qty: 90 CAPSULE | Refills: 1 | Status: SHIPPED | OUTPATIENT
Start: 2024-05-01

## 2024-05-01 NOTE — TELEPHONE ENCOUNTER
Rx Refill Note  Requested Prescriptions     Pending Prescriptions Disp Refills    celecoxib (CeleBREX) 200 MG capsule 90 capsule 1     Sig: Take 1 capsule by mouth Daily.      Last office visit with prescribing clinician: 2/20/2024   Last telemedicine visit with prescribing clinician: Visit date not found   Next office visit with prescribing clinician: 7/10/2024                         Would you like a call back once the refill request has been completed: [] Yes [] No    If the office needs to give you a call back, can they leave a voicemail: [] Yes [] No    Malvin Quezada MA  05/01/24, 14:35 EDT

## 2024-05-02 ENCOUNTER — PRIOR AUTHORIZATION (OUTPATIENT)
Dept: INTERNAL MEDICINE | Facility: CLINIC | Age: 49
End: 2024-05-02
Payer: COMMERCIAL

## 2024-05-02 NOTE — TELEPHONE ENCOUNTER
PA approved for Wegovy.  Message from plan: Your PA request has been approved. Additional information will be provided in the approval communication. (Message 2629). Authorization Expiration Date: October 29, 2024.    Text sent to pt from covermymeds.

## 2024-05-09 ENCOUNTER — TELEPHONE (OUTPATIENT)
Dept: INTERNAL MEDICINE | Facility: CLINIC | Age: 49
End: 2024-05-09
Payer: COMMERCIAL

## 2024-05-09 DIAGNOSIS — E66.9 CLASS 1 OBESITY: ICD-10-CM

## 2024-05-09 RX ORDER — SEMAGLUTIDE 0.25 MG/.5ML
0.25 INJECTION, SOLUTION SUBCUTANEOUS WEEKLY
Qty: 2 ML | Refills: 0 | Status: CANCELLED | OUTPATIENT
Start: 2024-05-09

## 2024-05-09 NOTE — TELEPHONE ENCOUNTER
Caller: Keerthi Pillai    Relationship to patient: Self    Best call back number: [phone]     Patient is needing: STATED SHE WAS TOLD TO CALL WHEN SHE GOT THE FIRST TWO DOSES OF HER     Semaglutide-Weight Management (Wegovy) 0.25 MG/0.5ML solution auto-injector     PLEASE ADVISE

## 2024-05-10 NOTE — TELEPHONE ENCOUNTER
Spoke with patient and she stated she was told not to start the medication until she gets the maintenance dose, please advise

## 2024-05-13 NOTE — TELEPHONE ENCOUNTER
Spoke with patient and she stated she got the 0.25mg and 0.5mg at her house, she is wanting to know if she should get the higher dose now, please advise

## 2024-05-14 RX ORDER — SEMAGLUTIDE 1 MG/.5ML
1 INJECTION, SOLUTION SUBCUTANEOUS WEEKLY
Qty: 2 ML | Refills: 0 | Status: SHIPPED | OUTPATIENT
Start: 2024-05-14

## 2024-05-14 NOTE — TELEPHONE ENCOUNTER
Spoke with patient and she stated she only have 0.25mg and 0.5mg, patient wanted to make sure she does not need the 0.75mg, please advise

## 2024-05-20 RX ORDER — DESVENLAFAXINE 100 MG/1
100 TABLET, EXTENDED RELEASE ORAL DAILY
Qty: 90 TABLET | Refills: 1 | Status: SHIPPED | OUTPATIENT
Start: 2024-05-20

## 2024-07-10 ENCOUNTER — OFFICE VISIT (OUTPATIENT)
Dept: INTERNAL MEDICINE | Facility: CLINIC | Age: 49
End: 2024-07-10
Payer: COMMERCIAL

## 2024-07-10 VITALS
HEIGHT: 61 IN | BODY MASS INDEX: 30.06 KG/M2 | RESPIRATION RATE: 18 BRPM | DIASTOLIC BLOOD PRESSURE: 86 MMHG | TEMPERATURE: 98.4 F | SYSTOLIC BLOOD PRESSURE: 128 MMHG | OXYGEN SATURATION: 99 % | WEIGHT: 159.2 LBS | HEART RATE: 63 BPM

## 2024-07-10 DIAGNOSIS — F33.0 MAJOR DEPRESSIVE DISORDER, RECURRENT, MILD: Primary | ICD-10-CM

## 2024-07-10 DIAGNOSIS — Z14.8 HEMOCHROMATOSIS CARRIER: ICD-10-CM

## 2024-07-10 DIAGNOSIS — C50.211 MALIGNANT NEOPLASM OF UPPER-INNER QUADRANT OF RIGHT BREAST IN FEMALE, ESTROGEN RECEPTOR NEGATIVE: ICD-10-CM

## 2024-07-10 DIAGNOSIS — Z17.1 MALIGNANT NEOPLASM OF UPPER-INNER QUADRANT OF RIGHT BREAST IN FEMALE, ESTROGEN RECEPTOR NEGATIVE: ICD-10-CM

## 2024-07-10 DIAGNOSIS — Z96.651 STATUS POST RIGHT PARTIAL KNEE REPLACEMENT: ICD-10-CM

## 2024-07-10 DIAGNOSIS — E66.9 OBESITY WITHOUT SERIOUS COMORBIDITY, UNSPECIFIED CLASSIFICATION, UNSPECIFIED OBESITY TYPE: ICD-10-CM

## 2024-07-10 DIAGNOSIS — R41.840 CONCENTRATION DEFICIT: ICD-10-CM

## 2024-07-10 PROCEDURE — 99214 OFFICE O/P EST MOD 30 MIN: CPT | Performed by: INTERNAL MEDICINE

## 2024-07-10 RX ORDER — ATOMOXETINE 25 MG/1
25 CAPSULE ORAL DAILY
Qty: 90 CAPSULE | Refills: 1 | Status: SHIPPED | OUTPATIENT
Start: 2024-07-10

## 2024-07-10 NOTE — PROGRESS NOTES
"Chief Complaint  Depression (4 month follow up )      Subjective      History of Present Illness  The patient presents for evaluation of multiple medical concerns.    The patient expresses satisfaction with the efficacy of Wegovy, with no reported side effects. She has experienced dehydration on two occasions, once at a barrel race. She typically consumes water, and has attempted to consume Gatorade.    The patient expresses concern about the potential impact of Pristiq on her focus and cognitive function. She struggles with organization and procrastination, even during conversations. She is currently employed at [employer], which exacerbates her work-related stress.    She had an implant exchange done by Dr. Mobley. She had some capsular contracture, but by changing the pocket, he fixed a lot of that. She does not have the pain that she had before. She had textured implants in 08/2016. She has finished physical therapy for her knee. She is going to Dr. Palma, the chiropractor. She does not walk with a limp.         Objective   Vital Signs:   Vitals:    07/10/24 1629   BP: 128/86   BP Location: Left arm   Patient Position: Sitting   Cuff Size: Adult   Pulse: 63   Resp: 18   Temp: 98.4 °F (36.9 °C)   TempSrc: Temporal   SpO2: 99%   Weight: 72.2 kg (159 lb 3.2 oz)   Height: 154.9 cm (60.98\")     Body mass index is 30.1 kg/m².    Wt Readings from Last 3 Encounters:   07/10/24 72.2 kg (159 lb 3.2 oz)   04/30/24 75.7 kg (166 lb 12.8 oz)   02/20/24 74.5 kg (164 lb 3.2 oz)     BP Readings from Last 3 Encounters:   07/10/24 128/86   04/30/24 118/76   02/20/24 112/70       Health Maintenance   Topic Date Due    ANNUAL PHYSICAL  Never done    PAP SMEAR  05/03/2024    COVID-19 Vaccine (4 - 2023-24 season) 08/01/2024 (Originally 9/1/2023)    INFLUENZA VACCINE  08/01/2024    BMI FOLLOWUP  05/14/2025    COLORECTAL CANCER SCREENING  06/07/2028    TDAP/TD VACCINES (2 - Td or Tdap) 01/27/2033    HEPATITIS C SCREENING  Completed    " Pneumococcal Vaccine 0-64  Aged Out       Physical Exam  Vitals reviewed.   Constitutional:       Appearance: Normal appearance. She is well-developed.   HENT:      Head: Normocephalic and atraumatic.      Right Ear: External ear normal.      Left Ear: External ear normal.   Eyes:      Conjunctiva/sclera: Conjunctivae normal.      Pupils: Pupils are equal, round, and reactive to light.   Cardiovascular:      Rate and Rhythm: Normal rate and regular rhythm.      Heart sounds: No murmur heard.     No friction rub. No gallop.   Pulmonary:      Effort: Pulmonary effort is normal.      Breath sounds: Normal breath sounds. No wheezing or rhonchi.   Skin:     General: Skin is warm and dry.   Neurological:      Mental Status: She is alert and oriented to person, place, and time.   Psychiatric:         Mood and Affect: Affect normal.         Behavior: Behavior normal.         Thought Content: Thought content normal.        Physical Exam        Result Review :  The following data was reviewed by: Jennifer Ocasio MD on 07/10/2024:         Results              Procedures            Assessment & Plan  Major depressive disorder, recurrent, mild   continue current meds as it has worked well for her previously  Hemochromatosis carrier  Continues to donate blood every 3 to 6 months  Malignant neoplasm of upper-inner quadrant of right breast in female, estrogen receptor negative  Medication well she understands the risk of this medication  Status post right partial knee replacement  Doing well continue to monitor  Obesity without serious comorbidity, unspecified classification, unspecified obesity type    The patient has experienced a weight loss of approximately 7 pounds over the past 3 months. The patient was advised to maintain hydration earlier in the day. The dosage of Wegovy was increased to 1 mg, with the option to continue with 0.5 mg if she perceives the optimal dosage.    Concentration deficit    Strattera was proposed  as a potential treatment option.     New Medications Ordered This Visit   Medications    atomoxetine (Strattera) 25 MG capsule     Sig: Take 1 capsule by mouth Daily.     Dispense:  90 capsule     Refill:  1          Assessment & Plan    Follow-up  A follow-up appointment is scheduled for 2 months from now.    Patient or patient representative verbalized consent for the use of Ambient Listening during the visit with  Jennifer Oacsio MD for chart documentation. 8/3/2024  15:15 EDT      FOLLOW UP  No follow-ups on file.  Patient was given instructions and counseling regarding her condition or for health maintenance advice. Please see specific information pulled into the AVS if appropriate.     Jennifer Ocasio MD  07/10/24  17:18 EDT    CURRENT & DISCONTINUED MEDICATIONS  Current Outpatient Medications   Medication Instructions    Allergy Relief 180 mg, Oral, Daily    atomoxetine (STRATTERA) 25 mg, Oral, Daily    celecoxib (CELEBREX) 200 mg, Oral, Daily    desvenlafaxine (PRISTIQ) 100 mg, Oral, Daily    estradiol-norethindrone (ACTIVELLA) 1-0.5 MG per tablet 1 tablet, Oral, Daily    fluticasone (FLONASE) 50 MCG/ACT nasal spray 2 sprays, Nasal, Daily    lisinopril (PRINIVIL,ZESTRIL) 20 MG tablet TAKE ONE TABLET BY MOUTH EVERY DAY    mupirocin (BACTROBAN) 2 % ointment     tiZANidine (ZANAFLEX) 2 mg, Oral, Nightly PRN    traZODone (DESYREL) 50 mg, Oral, Every Night at Bedtime    triamcinolone (KENALOG) 0.1 % ointment     Wegovy 1 mg, Subcutaneous, Weekly       Medications Discontinued During This Encounter   Medication Reason    Semaglutide-Weight Management (Wegovy) 0.25 MG/0.5ML solution auto-injector *Therapy completed    Semaglutide-Weight Management 0.5 MG/0.5ML solution auto-injector

## 2024-07-15 RX ORDER — TRAZODONE HYDROCHLORIDE 50 MG/1
50 TABLET ORAL
Qty: 90 TABLET | Refills: 1 | Status: SHIPPED | OUTPATIENT
Start: 2024-07-15

## 2024-08-01 ENCOUNTER — OFFICE VISIT (OUTPATIENT)
Dept: INTERNAL MEDICINE | Facility: CLINIC | Age: 49
End: 2024-08-01
Payer: COMMERCIAL

## 2024-08-01 VITALS
HEIGHT: 61 IN | RESPIRATION RATE: 18 BRPM | BODY MASS INDEX: 28.58 KG/M2 | WEIGHT: 151.4 LBS | HEART RATE: 97 BPM | SYSTOLIC BLOOD PRESSURE: 116 MMHG | OXYGEN SATURATION: 98 % | DIASTOLIC BLOOD PRESSURE: 74 MMHG | TEMPERATURE: 98.4 F

## 2024-08-01 DIAGNOSIS — J02.9 SORE THROAT: ICD-10-CM

## 2024-08-01 DIAGNOSIS — R05.9 COUGH, UNSPECIFIED TYPE: ICD-10-CM

## 2024-08-01 DIAGNOSIS — E66.9 CLASS 1 OBESITY: ICD-10-CM

## 2024-08-01 DIAGNOSIS — R09.81 CONGESTION OF NASAL SINUS: ICD-10-CM

## 2024-08-01 DIAGNOSIS — Z79.899 MEDICATION MANAGEMENT: ICD-10-CM

## 2024-08-01 DIAGNOSIS — H66.011 NON-RECURRENT ACUTE SUPPURATIVE OTITIS MEDIA OF RIGHT EAR WITH SPONTANEOUS RUPTURE OF TYMPANIC MEMBRANE: Primary | ICD-10-CM

## 2024-08-01 LAB
EXPIRATION DATE: NORMAL
EXPIRATION DATE: NORMAL
FLUAV AG UPPER RESP QL IA.RAPID: NOT DETECTED
FLUBV AG UPPER RESP QL IA.RAPID: NOT DETECTED
INTERNAL CONTROL: NORMAL
INTERNAL CONTROL: NORMAL
Lab: NORMAL
Lab: NORMAL
S PYO AG THROAT QL: NEGATIVE
SARS-COV-2 AG UPPER RESP QL IA.RAPID: NOT DETECTED

## 2024-08-01 PROCEDURE — 99214 OFFICE O/P EST MOD 30 MIN: CPT | Performed by: NURSE PRACTITIONER

## 2024-08-01 PROCEDURE — 87428 SARSCOV & INF VIR A&B AG IA: CPT | Performed by: NURSE PRACTITIONER

## 2024-08-01 PROCEDURE — 87880 STREP A ASSAY W/OPTIC: CPT | Performed by: NURSE PRACTITIONER

## 2024-08-01 RX ORDER — CEFDINIR 300 MG/1
300 CAPSULE ORAL 2 TIMES DAILY
Qty: 20 CAPSULE | Refills: 0 | Status: SHIPPED | OUTPATIENT
Start: 2024-08-01 | End: 2024-08-11

## 2024-08-01 RX ORDER — SEMAGLUTIDE 1.7 MG/.75ML
1.7 INJECTION, SOLUTION SUBCUTANEOUS WEEKLY
Qty: 3 ML | Refills: 2 | Status: SHIPPED | OUTPATIENT
Start: 2024-08-01 | End: 2024-10-30

## 2024-08-01 RX ORDER — CIPROFLOXACIN AND DEXAMETHASONE 3; 1 MG/ML; MG/ML
4 SUSPENSION/ DROPS AURICULAR (OTIC) 2 TIMES DAILY
Qty: 7.5 ML | Refills: 0 | Status: SHIPPED | OUTPATIENT
Start: 2024-08-01

## 2024-08-01 NOTE — PROGRESS NOTES
"Chief Complaint  Cough, Nasal Congestion, Earache, Headache, Ear Drainage, Chest Pain, and Sore Throat    Subjective        Keerthi Pillai presents to [facility] for cough, congestion, right-sided ear pain, headache, sore throat.  Patient has been feeling bad since July 11, returning from vacation.  She did go to urgent care on the 19th, COVID flu and strep were all negative.  She got better within a couple of days, then symptoms returned 5 days ago.    Objective   Vital Signs:   /74 (BP Location: Left arm, Patient Position: Sitting, Cuff Size: Adult)   Pulse 97   Temp 98.4 °F (36.9 °C) (Temporal)   Resp 18   Ht 154.9 cm (61\")   Wt 68.7 kg (151 lb 6.4 oz)   SpO2 98%   BMI 28.61 kg/m²     Physical Exam  Vitals and nursing note reviewed.   Constitutional:       Appearance: Normal appearance.   HENT:      Head: Normocephalic and atraumatic.      Right Ear: Ear canal and external ear normal.      Left Ear: Tympanic membrane, ear canal and external ear normal.      Ears:      Comments: Right tympanic membrane with significant redness, drainage, concern for perforation  Cardiovascular:      Rate and Rhythm: Normal rate and regular rhythm.      Pulses: Normal pulses.      Heart sounds: Normal heart sounds.   Pulmonary:      Effort: Pulmonary effort is normal.      Breath sounds: Normal breath sounds.   Skin:     Capillary Refill: Capillary refill takes less than 2 seconds.   Neurological:      Mental Status: She is alert.   Psychiatric:         Mood and Affect: Mood normal.        Result Review :  {The following data was reviewed by ANATOLIY Collado on 08/01/24                Diagnoses and all orders for this visit:    1. Non-recurrent acute suppurative otitis media of right ear with spontaneous rupture of tympanic membrane (Primary)  -     ciprofloxacin-dexAMETHasone (Ciprodex) 0.3-0.1 % otic suspension; Administer 4 drops to the right ear 2 (Two) Times a Day.  Dispense: 7.5 " mL; Refill: 0  -     cefdinir (OMNICEF) 300 MG capsule; Take 1 capsule by mouth 2 (Two) Times a Day for 10 days.  Dispense: 20 capsule; Refill: 0    2. Congestion of nasal sinus  -     POCT rapid strep A  -     POCT SARS-CoV-2 Antigen JOSEPHINE + Flu    3. Cough, unspecified type  -     POCT SARS-CoV-2 Antigen JOSEPHINE + Flu    4. Sore throat  -     POCT rapid strep A    5. Class 1 obesity    6. Medication management    Other orders  -     Semaglutide-Weight Management (Wegovy) 1.7 MG/0.75ML solution auto-injector; Inject 0.75 mL under the skin into the appropriate area as directed 1 (One) Time Per Week for 90 days.  Dispense: 3 mL; Refill: 2    For patient's acute symptoms, she did have concerning signs for otitis media with perforation, will put on oral and otic drops.  If symptoms do not resolve, close follow-up recommended.  Otherwise, she feels like she can manage her symptoms at home without any difficulty.    Patient did need to follow-up on her weight loss medication, Wegovy.  She was curious about her increase in dose, so we did go ahead and go up on her dosing, she has 2 doses left of 1 mg, as long as she tolerates those well, recommend she increase, maintain at 1.7 mg for 90 days, follow-up at that point with her PCP, if unable to get in, would be happy to see.      Follow Up   No follow-ups on file.  Patient was given instructions and counseling regarding her condition or for health maintenance advice. Please see specific information pulled into the AVS if appropriate.     Jasper Beavers, ANATOLIY  8/1/2024  This note was electronically signed.

## 2024-08-07 ENCOUNTER — OFFICE VISIT (OUTPATIENT)
Dept: INTERNAL MEDICINE | Facility: CLINIC | Age: 49
End: 2024-08-07
Payer: COMMERCIAL

## 2024-08-07 VITALS
SYSTOLIC BLOOD PRESSURE: 124 MMHG | TEMPERATURE: 98.1 F | WEIGHT: 147.5 LBS | HEIGHT: 61 IN | HEART RATE: 68 BPM | OXYGEN SATURATION: 100 % | DIASTOLIC BLOOD PRESSURE: 68 MMHG | BODY MASS INDEX: 27.85 KG/M2 | RESPIRATION RATE: 18 BRPM

## 2024-08-07 DIAGNOSIS — H65.93 FLUID LEVEL BEHIND TYMPANIC MEMBRANE OF BOTH EARS: Primary | ICD-10-CM

## 2024-08-07 PROCEDURE — 99213 OFFICE O/P EST LOW 20 MIN: CPT | Performed by: INTERNAL MEDICINE

## 2024-08-07 RX ORDER — AZELASTINE 1 MG/ML
2 SPRAY, METERED NASAL 2 TIMES DAILY
Qty: 30 ML | Refills: 2 | Status: SHIPPED | OUTPATIENT
Start: 2024-08-07

## 2024-08-28 NOTE — PROGRESS NOTES
"Chief Complaint  Earache (Was in the office last week and seen Jasper Beavers, was given antibiotics and drops for right ear but now left ear has started hurting and her hearing in both ears is muffled, it is making her nauseous and dizzy and she is having a hard time keeping the ear drops in her ears. )    Subjective      Keerthi Pillai is a 49 y.o. female who presents to [facility]     Presenting for evaluation of left ear pain. Was seen recently for right ear pain and started on abx and drops. Hearing in both ears seems muffled and she has been having some dizziness and nausea.     Objective   Vital Signs:   Vitals:    08/07/24 1555   BP: 124/68   BP Location: Left arm   Patient Position: Sitting   Cuff Size: Adult   Pulse: 68   Resp: 18   Temp: 98.1 °F (36.7 °C)   TempSrc: Temporal   SpO2: 100%   Weight: 66.9 kg (147 lb 8 oz)   Height: 154.9 cm (61\")     Body mass index is 27.87 kg/m².    Wt Readings from Last 3 Encounters:   08/07/24 66.9 kg (147 lb 8 oz)   08/01/24 68.7 kg (151 lb 6.4 oz)   07/17/24 69.7 kg (153 lb 9.6 oz)     BP Readings from Last 3 Encounters:   08/07/24 124/68   08/01/24 116/74   07/17/24 141/89       Health Maintenance   Topic Date Due    ANNUAL PHYSICAL  Never done    COVID-19 Vaccine (4 - 2023-24 season) 09/01/2023    PAP SMEAR  05/03/2024    INFLUENZA VACCINE  08/01/2024    BMI FOLLOWUP  08/01/2025    COLORECTAL CANCER SCREENING  06/07/2028    TDAP/TD VACCINES (2 - Td or Tdap) 01/27/2033    HEPATITIS C SCREENING  Completed    Pneumococcal Vaccine 0-64  Aged Out       Physical Exam  Vitals reviewed.   Constitutional:       Appearance: Normal appearance. She is well-developed.   HENT:      Head: Normocephalic and atraumatic.      Right Ear: Tympanic membrane normal. A middle ear effusion is present.      Left Ear: Tympanic membrane normal. A middle ear effusion is present.      Mouth/Throat:      Pharynx: No oropharyngeal exudate.   Eyes: "      Conjunctiva/sclera: Conjunctivae normal.      Pupils: Pupils are equal, round, and reactive to light.   Neck:      Thyroid: No thyromegaly or thyroid tenderness.   Cardiovascular:      Rate and Rhythm: Normal rate and regular rhythm.      Heart sounds: No murmur heard.     No friction rub. No gallop.   Pulmonary:      Effort: Pulmonary effort is normal.      Breath sounds: Normal breath sounds. No wheezing or rhonchi.   Lymphadenopathy:      Cervical: No cervical adenopathy.   Skin:     General: Skin is warm and dry.   Neurological:      Mental Status: She is alert and oriented to person, place, and time.   Psychiatric:         Mood and Affect: Affect normal.          Result Review :  The following data was reviewed by: Rhina Story MD on 2024:         Procedures          Assessment & Plan  Fluid level behind tympanic membrane of both ears  Infection appears to have resolved but she does have a significant amount of fluid on exam. Recommend continuing Allegra and Flonase. Will add Astelin nasal spray as well to help clear effusions.      New Medications Ordered This Visit   Medications    azelastine (ASTELIN) 0.1 % nasal spray     Si sprays into the nostril(s) as directed by provider 2 (Two) Times a Day. Use in each nostril as directed     Dispense:  30 mL     Refill:  2                    FOLLOW UP  Return if symptoms worsen or fail to improve.  Patient was given instructions and counseling regarding her condition or for health maintenance advice. Please see specific information pulled into the AVS if appropriate.       Rhina Story MD  24  13:54 EDT    CURRENT & DISCONTINUED MEDICATIONS  Current Outpatient Medications   Medication Instructions    Allergy Relief 180 mg, Oral, Daily    atomoxetine (STRATTERA) 25 mg, Oral, Daily    azelastine (ASTELIN) 0.1 % nasal spray 2 sprays, Nasal, 2 Times Daily, Use in each nostril as directed    benzonatate (TESSALON) 200 mg,  Oral, 3 Times Daily PRN    celecoxib (CELEBREX) 200 mg, Oral, Daily    ciprofloxacin-dexAMETHasone (Ciprodex) 0.3-0.1 % otic suspension 4 drops, Right Ear, 2 Times Daily    desvenlafaxine (PRISTIQ) 100 mg, Oral, Daily    estradiol-norethindrone (ACTIVELLA) 1-0.5 MG per tablet 1 tablet, Oral, Daily    fluticasone (FLONASE) 50 MCG/ACT nasal spray 2 sprays, Nasal, Daily    lisinopril (PRINIVIL,ZESTRIL) 20 MG tablet TAKE ONE TABLET BY MOUTH EVERY DAY    mupirocin (BACTROBAN) 2 % ointment     tiZANidine (ZANAFLEX) 2 mg, Oral, Nightly PRN    traZODone (DESYREL) 50 mg, Oral, Every Night at Bedtime    triamcinolone (KENALOG) 0.1 % ointment     Wegovy 1.7 mg, Subcutaneous, Weekly       There are no discontinued medications.

## 2024-09-18 ENCOUNTER — OFFICE VISIT (OUTPATIENT)
Dept: INTERNAL MEDICINE | Facility: CLINIC | Age: 49
End: 2024-09-18
Payer: COMMERCIAL

## 2024-09-18 VITALS
WEIGHT: 143 LBS | TEMPERATURE: 99.4 F | HEART RATE: 97 BPM | SYSTOLIC BLOOD PRESSURE: 118 MMHG | HEIGHT: 61 IN | BODY MASS INDEX: 27 KG/M2 | OXYGEN SATURATION: 98 % | RESPIRATION RATE: 20 BRPM | DIASTOLIC BLOOD PRESSURE: 82 MMHG

## 2024-09-18 DIAGNOSIS — R41.840 CONCENTRATION DEFICIT: ICD-10-CM

## 2024-09-18 DIAGNOSIS — F33.0 MAJOR DEPRESSIVE DISORDER, RECURRENT, MILD: ICD-10-CM

## 2024-09-18 DIAGNOSIS — R21 RASH: Primary | ICD-10-CM

## 2024-09-18 DIAGNOSIS — Z13.220 SCREENING, LIPID: ICD-10-CM

## 2024-09-18 DIAGNOSIS — Z14.8 HEMOCHROMATOSIS CARRIER: ICD-10-CM

## 2024-09-18 LAB
ALBUMIN SERPL-MCNC: 4.3 G/DL (ref 3.5–5.2)
ALBUMIN/GLOB SERPL: 1.6 G/DL
ALP SERPL-CCNC: 65 U/L (ref 39–117)
ALT SERPL W P-5'-P-CCNC: 15 U/L (ref 1–33)
ANION GAP SERPL CALCULATED.3IONS-SCNC: 10.9 MMOL/L (ref 5–15)
AST SERPL-CCNC: 13 U/L (ref 1–32)
BASOPHILS # BLD AUTO: 0.08 10*3/MM3 (ref 0–0.2)
BASOPHILS NFR BLD AUTO: 1.5 % (ref 0–1.5)
BILIRUB SERPL-MCNC: 0.3 MG/DL (ref 0–1.2)
BUN SERPL-MCNC: 11 MG/DL (ref 6–20)
BUN/CREAT SERPL: 14.9 (ref 7–25)
CALCIUM SPEC-SCNC: 9.3 MG/DL (ref 8.6–10.5)
CHLORIDE SERPL-SCNC: 104 MMOL/L (ref 98–107)
CHOLEST SERPL-MCNC: 158 MG/DL (ref 0–200)
CO2 SERPL-SCNC: 26.1 MMOL/L (ref 22–29)
CREAT SERPL-MCNC: 0.74 MG/DL (ref 0.57–1)
CRP SERPL-MCNC: <0.3 MG/DL (ref 0–0.5)
DEPRECATED RDW RBC AUTO: 42 FL (ref 37–54)
EGFRCR SERPLBLD CKD-EPI 2021: 99.9 ML/MIN/1.73
EOSINOPHIL # BLD AUTO: 0.09 10*3/MM3 (ref 0–0.4)
EOSINOPHIL NFR BLD AUTO: 1.7 % (ref 0.3–6.2)
ERYTHROCYTE [DISTWIDTH] IN BLOOD BY AUTOMATED COUNT: 11.8 % (ref 12.3–15.4)
ERYTHROCYTE [SEDIMENTATION RATE] IN BLOOD: 4 MM/HR (ref 0–20)
FERRITIN SERPL-MCNC: 947 NG/ML (ref 13–150)
GLOBULIN UR ELPH-MCNC: 2.7 GM/DL
GLUCOSE SERPL-MCNC: 97 MG/DL (ref 65–99)
HCT VFR BLD AUTO: 37.9 % (ref 34–46.6)
HDLC SERPL-MCNC: 36 MG/DL (ref 40–60)
HGB BLD-MCNC: 12.9 G/DL (ref 12–15.9)
IMM GRANULOCYTES # BLD AUTO: 0.02 10*3/MM3 (ref 0–0.05)
IMM GRANULOCYTES NFR BLD AUTO: 0.4 % (ref 0–0.5)
LDLC SERPL CALC-MCNC: 102 MG/DL (ref 0–100)
LDLC/HDLC SERPL: 2.8 {RATIO}
LYMPHOCYTES # BLD AUTO: 1.39 10*3/MM3 (ref 0.7–3.1)
LYMPHOCYTES NFR BLD AUTO: 26.2 % (ref 19.6–45.3)
MCH RBC QN AUTO: 33.2 PG (ref 26.6–33)
MCHC RBC AUTO-ENTMCNC: 34 G/DL (ref 31.5–35.7)
MCV RBC AUTO: 97.4 FL (ref 79–97)
MONOCYTES # BLD AUTO: 0.47 10*3/MM3 (ref 0.1–0.9)
MONOCYTES NFR BLD AUTO: 8.9 % (ref 5–12)
NEUTROPHILS NFR BLD AUTO: 3.25 10*3/MM3 (ref 1.7–7)
NEUTROPHILS NFR BLD AUTO: 61.3 % (ref 42.7–76)
NRBC BLD AUTO-RTO: 0 /100 WBC (ref 0–0.2)
PLATELET # BLD AUTO: 254 10*3/MM3 (ref 140–450)
PMV BLD AUTO: 11.3 FL (ref 6–12)
POTASSIUM SERPL-SCNC: 3.6 MMOL/L (ref 3.5–5.2)
PROT SERPL-MCNC: 7 G/DL (ref 6–8.5)
RBC # BLD AUTO: 3.89 10*6/MM3 (ref 3.77–5.28)
SODIUM SERPL-SCNC: 141 MMOL/L (ref 136–145)
TRIGL SERPL-MCNC: 106 MG/DL (ref 0–150)
TSH SERPL DL<=0.05 MIU/L-ACNC: 0.52 UIU/ML (ref 0.27–4.2)
VLDLC SERPL-MCNC: 20 MG/DL (ref 5–40)
WBC NRBC COR # BLD AUTO: 5.3 10*3/MM3 (ref 3.4–10.8)

## 2024-09-18 PROCEDURE — 80050 GENERAL HEALTH PANEL: CPT | Performed by: INTERNAL MEDICINE

## 2024-09-18 PROCEDURE — 80061 LIPID PANEL: CPT | Performed by: INTERNAL MEDICINE

## 2024-09-18 PROCEDURE — 85652 RBC SED RATE AUTOMATED: CPT | Performed by: INTERNAL MEDICINE

## 2024-09-18 PROCEDURE — 86140 C-REACTIVE PROTEIN: CPT | Performed by: INTERNAL MEDICINE

## 2024-09-18 PROCEDURE — 86038 ANTINUCLEAR ANTIBODIES: CPT | Performed by: INTERNAL MEDICINE

## 2024-09-18 PROCEDURE — 99214 OFFICE O/P EST MOD 30 MIN: CPT | Performed by: INTERNAL MEDICINE

## 2024-09-18 PROCEDURE — 82728 ASSAY OF FERRITIN: CPT | Performed by: INTERNAL MEDICINE

## 2024-09-18 RX ORDER — ATOMOXETINE 40 MG/1
40 CAPSULE ORAL DAILY
Qty: 90 CAPSULE | Refills: 1 | Status: SHIPPED | OUTPATIENT
Start: 2024-09-18

## 2024-09-22 LAB
ANA SER QL IF: NEGATIVE
LABORATORY COMMENT REPORT: NORMAL

## 2024-10-16 RX ORDER — CELECOXIB 200 MG/1
200 CAPSULE ORAL DAILY
Qty: 90 CAPSULE | Refills: 1 | Status: SHIPPED | OUTPATIENT
Start: 2024-10-16

## 2024-10-16 NOTE — TELEPHONE ENCOUNTER
Rx Refill Note  Requested Prescriptions     Pending Prescriptions Disp Refills    celecoxib (CeleBREX) 200 MG capsule 90 capsule 1     Sig: Take 1 capsule by mouth Daily.      Last office visit with prescribing clinician: 9/18/2024   Last telemedicine visit with prescribing clinician: Visit date not found   Next office visit with prescribing clinician: 12/18/2024                         Would you like a call back once the refill request has been completed: [] Yes [] No    If the office needs to give you a call back, can they leave a voicemail: [] Yes [] No    Malvin Quezada MA  10/16/24, 14:51 EDT

## 2024-11-05 RX ORDER — DESVENLAFAXINE 100 MG/1
100 TABLET, EXTENDED RELEASE ORAL DAILY
Qty: 90 TABLET | Refills: 1 | Status: SHIPPED | OUTPATIENT
Start: 2024-11-05

## 2024-11-27 ENCOUNTER — TELEPHONE (OUTPATIENT)
Dept: INTERNAL MEDICINE | Facility: CLINIC | Age: 49
End: 2024-11-27
Payer: COMMERCIAL

## 2024-11-27 NOTE — TELEPHONE ENCOUNTER
Caller: Keerthi Pillai    Relationship: Self    Best call back number:   Telephone Information:   Mobile [phone]        What is the best time to reach you: ANYTIME     Who are you requesting to speak with (clinical staff, provider,  specific staff member): CLINICAL     What was the call regarding: PATIENT WOULD LIKE TO KNOW IF JAMSHID WOULD LIKE TO UP HER WEGOVY TO THE NEXT DOSE AND IF SO IF SHE CAN SEND IT TO   [facility] - [place], [place] - [address] [facility] [phone]  - [phone] FX

## 2024-11-29 NOTE — TELEPHONE ENCOUNTER
That's fine, but we don't have wegovy on her med list.  Please call and confirm current dose and how she is feeling on it.

## 2024-12-02 NOTE — TELEPHONE ENCOUNTER
Called and spoke with pt, pt stated she is currently on the 1.7 mg dose, pt is needing to increase dose to 2.4, medication sent to provider for sign off.

## 2024-12-09 ENCOUNTER — PRIOR AUTHORIZATION (OUTPATIENT)
Dept: INTERNAL MEDICINE | Facility: CLINIC | Age: 49
End: 2024-12-09
Payer: COMMERCIAL

## 2024-12-09 NOTE — TELEPHONE ENCOUNTER
Wegovy 2.4MG/0.75ML auto-injectors    The authorization is valid from 11/09/2024 through 12/09/2025. A letter of explanation will also be  mailed to the patient.

## 2024-12-18 ENCOUNTER — OFFICE VISIT (OUTPATIENT)
Dept: INTERNAL MEDICINE | Facility: CLINIC | Age: 49
End: 2024-12-18
Payer: COMMERCIAL

## 2024-12-18 VITALS
HEART RATE: 70 BPM | DIASTOLIC BLOOD PRESSURE: 64 MMHG | WEIGHT: 137.2 LBS | RESPIRATION RATE: 20 BRPM | OXYGEN SATURATION: 99 % | TEMPERATURE: 98.5 F | HEIGHT: 61 IN | BODY MASS INDEX: 25.9 KG/M2 | SYSTOLIC BLOOD PRESSURE: 116 MMHG

## 2024-12-18 DIAGNOSIS — Z17.1 MALIGNANT NEOPLASM OF UPPER-INNER QUADRANT OF RIGHT BREAST IN FEMALE, ESTROGEN RECEPTOR NEGATIVE: ICD-10-CM

## 2024-12-18 DIAGNOSIS — F33.0 MAJOR DEPRESSIVE DISORDER, RECURRENT, MILD: ICD-10-CM

## 2024-12-18 DIAGNOSIS — F90.2 ATTENTION DEFICIT HYPERACTIVITY DISORDER (ADHD), COMBINED TYPE: Primary | ICD-10-CM

## 2024-12-18 DIAGNOSIS — R53.82 CHRONIC FATIGUE: ICD-10-CM

## 2024-12-18 DIAGNOSIS — G47.33 OSA ON CPAP: ICD-10-CM

## 2024-12-18 DIAGNOSIS — C50.211 MALIGNANT NEOPLASM OF UPPER-INNER QUADRANT OF RIGHT BREAST IN FEMALE, ESTROGEN RECEPTOR NEGATIVE: ICD-10-CM

## 2024-12-18 LAB
AMPHET+METHAMPHET UR QL: NEGATIVE
AMPHETAMINE INTERNAL CONTROL: NORMAL
AMPHETAMINES UR QL: NEGATIVE
BARBITURATE INTERNAL CONTROL: NORMAL
BARBITURATES UR QL SCN: NEGATIVE
BENZODIAZ UR QL SCN: NEGATIVE
BENZODIAZEPINE INTERNAL CONTROL: NORMAL
BUPRENORPHINE INTERNAL CONTROL: NORMAL
BUPRENORPHINE SERPL-MCNC: NEGATIVE NG/ML
CANNABINOIDS SERPL QL: NEGATIVE
COCAINE INTERNAL CONTROL: NORMAL
COCAINE UR QL: NEGATIVE
EXPIRATION DATE: NORMAL
Lab: NORMAL
MDMA (ECSTASY) INTERNAL CONTROL: NORMAL
MDMA UR QL SCN: NEGATIVE
METHADONE INTERNAL CONTROL: NORMAL
METHADONE UR QL SCN: NEGATIVE
METHAMPHETAMINE INTERNAL CONTROL: NORMAL
MORPHINE INTERNAL CONTROL: NORMAL
MORPHINE/OPIATES SCREEN, URINE: NEGATIVE
OXYCODONE INTERNAL CONTROL: NORMAL
OXYCODONE UR QL SCN: NEGATIVE
PCP UR QL SCN: NEGATIVE
PHENCYCLIDINE INTERNAL CONTROL: NORMAL
THC INTERNAL CONTROL: NORMAL

## 2024-12-18 PROCEDURE — 99214 OFFICE O/P EST MOD 30 MIN: CPT | Performed by: INTERNAL MEDICINE

## 2024-12-18 PROCEDURE — 80305 DRUG TEST PRSMV DIR OPT OBS: CPT | Performed by: INTERNAL MEDICINE

## 2024-12-18 RX ORDER — METHYLPHENIDATE HYDROCHLORIDE 18 MG/1
18 TABLET ORAL EVERY MORNING
Qty: 30 TABLET | Refills: 0 | Status: SHIPPED | OUTPATIENT
Start: 2024-12-18

## 2024-12-18 RX ORDER — TIZANIDINE 2 MG/1
2 TABLET ORAL NIGHTLY PRN
Qty: 90 TABLET | Refills: 1 | Status: SHIPPED | OUTPATIENT
Start: 2024-12-18

## 2024-12-18 NOTE — PROGRESS NOTES
"Chief Complaint  Obesity (3 mo f/u ) and Depression      Subjective      History of Present Illness  The patient presents for evaluation of ADHD, daytime sleepiness, weight management, and elevated ferritin levels.    She reports lack of focus due to physical and mental fatigue, affecting job security. She struggles to stay awake during the day, using Diet Mountain Dew, [employer], and Phong, which cause headaches. She seeks work accommodation for focus issues and considers crossword puzzles for ADHD. Strattera for 1.5 months showed no improvement. Pristiq 100 mg helps mental health but causes fatigue; 50 mg was less effective.    Tested for sleep apnea, owns a machine but hasn't used it for 6 months due to discomfort. Tried trazodone with the machine but needed a high dose to sleep. No snoring or apnea since weight loss. Takes trazodone nightly for sleep, reducing dose if taken later. Uses muscle relaxer sparingly for pain.    Underwent therapeutic phlebotomy since last visit.    On Wegovy, occasionally extends injection interval to 2.5 weeks. Maintains a healthy diet with fruits, carrots, celery, peanut butter, banana sandwiches, and protein powder. Aims to lose weight from 137 to 120 pounds and rebuild muscle mass.    Reports no chest pain but had a week-long episode of back pain. Occasionally experiences non-painful chest sensations.             Objective   Vital Signs:   Vitals:    12/18/24 1624   BP: 116/64   BP Location: Left arm   Patient Position: Sitting   Cuff Size: Adult   Pulse: 70   Resp: 20   Temp: 98.5 °F (36.9 °C)   TempSrc: Temporal   SpO2: 99%   Weight: 62.2 kg (137 lb 3.2 oz)   Height: 154.9 cm (60.98\")     Body mass index is 25.94 kg/m².    Wt Readings from Last 3 Encounters:   12/18/24 62.2 kg (137 lb 3.2 oz)   09/18/24 64.9 kg (143 lb)   08/07/24 66.9 kg (147 lb 8 oz)     BP Readings from Last 3 Encounters:   12/18/24 116/64   09/18/24 118/82   08/07/24 124/68       Health " Maintenance   Topic Date Due    ANNUAL PHYSICAL  Never done    PAP SMEAR  05/03/2024    COVID-19 Vaccine (4 - 2024-25 season) 12/20/2024 (Originally 9/1/2024)    INFLUENZA VACCINE  03/31/2025 (Originally 7/1/2024)    BMI FOLLOWUP  12/02/2025    COLORECTAL CANCER SCREENING  06/07/2028    TDAP/TD VACCINES (2 - Td or Tdap) 01/27/2033    HEPATITIS C SCREENING  Completed    Pneumococcal Vaccine 0-64  Aged Out    MAMMOGRAM  Discontinued       Physical Exam  Vitals reviewed.   Constitutional:       Appearance: Normal appearance. She is well-developed.   HENT:      Head: Normocephalic and atraumatic.      Right Ear: External ear normal.      Left Ear: External ear normal.   Eyes:      Conjunctiva/sclera: Conjunctivae normal.      Pupils: Pupils are equal, round, and reactive to light.   Cardiovascular:      Rate and Rhythm: Normal rate and regular rhythm.      Heart sounds: No murmur heard.     No friction rub. No gallop.   Pulmonary:      Effort: Pulmonary effort is normal.      Breath sounds: Normal breath sounds. No wheezing or rhonchi.   Skin:     General: Skin is warm and dry.   Neurological:      Mental Status: She is alert and oriented to person, place, and time.   Psychiatric:         Mood and Affect: Affect normal.         Behavior: Behavior normal.         Thought Content: Thought content normal.        Physical Exam        Result Review :  The following data was reviewed by: Jennifer Ocasio MD on 12/18/2024:         Results  - Laboratory Studies:    - Ferritin levels: almost 1000            Procedures            Assessment & Plan  Attention deficit hyperactivity disorder (ADHD), combined type      Orders:    methylphenidate (Concerta) 18 MG CR tablet; Take 1 tablet by mouth Every Morning    POC Medline 12 Panel Urine Drug Screen    Chronic fatigue         Malignant neoplasm of upper-inner quadrant of right breast in female, estrogen receptor negative         Major depressive disorder, recurrent, mild            JENNA on CPAP              Assessment & Plan  1. ADHD  - Symptoms align with ADHD specifically post chemo focus issues  - Caution with ADHD medication due to concurrent blood pressure medication  - Fatigue likely from Pristiq, tizanidine, or trazodone, with trazodone most probable  - Advised to reduce trazodone dosage by half to assess impact on daytime sleepiness  - Encouraged mental stimulation activities  - Prescribed Concerta 18 mg extended release daily, with 30-day refill  - Monitored every 3 months  - Discussed potential side effects of Concerta  - Advised to discontinue if severe jitteriness occurs  - Periodic urine drug screen    2. Daytime sleepiness  - Difficulty staying awake during the day  - Energy drinks ineffective  - Trazodone may contribute to sleepiness  - Advised to cut trazodone dosage in half if taken later  - Ensure adequate nighttime sleep  - Consider using sleep apnea machine again, adjusting settings if necessary    3. Elevated ferritin levels  - Ferritin levels near 1000  - Continue phlebotomy every 3 months    4. Weight management  - On Wegovy, occasionally extends injection interval to 2.5 weeks  - Maintains healthy diet with fruits, carrots, celery, peanut butter, banana sandwiches, and protein powder  - Aims to lose weight from 137 to 120 pounds and rebuild muscle mass  - Advised balanced diet and regular physical activity  - Gradually reduce Wegovy dosage upon reaching target weight    Follow-up in 3 months    Patient or patient representative verbalized consent for the use of Ambient Listening during the visit with  Jennifer Ocasio MD for chart documentation. 12/19/2024  17:46 EST      FOLLOW UP  No follow-ups on file.  Patient was given instructions and counseling regarding her condition or for health maintenance advice. Please see specific information pulled into the AVS if appropriate.     Jennifer Ocasio MD  12/19/24  15:35 EST    CURRENT & DISCONTINUED  MEDICATIONS  Current Outpatient Medications   Medication Instructions    Allergy Relief 180 mg, Oral, Daily    azelastine (ASTELIN) 0.1 % nasal spray 2 sprays, Nasal, 2 Times Daily, Use in each nostril as directed    benzonatate (TESSALON) 200 mg, Oral, 3 Times Daily PRN    celecoxib (CELEBREX) 200 mg, Oral, Daily    desvenlafaxine (PRISTIQ) 100 mg, Oral, Daily    estradiol-norethindrone (ACTIVELLA) 1-0.5 MG per tablet 1 tablet, Oral, Daily    fluticasone (FLONASE) 50 MCG/ACT nasal spray 2 sprays, Nasal, Daily    lisinopril (PRINIVIL,ZESTRIL) 20 MG tablet TAKE ONE TABLET BY MOUTH EVERY DAY    methylphenidate (CONCERTA) 18 mg, Oral, Every Morning    mupirocin (BACTROBAN) 2 % ointment     Semaglutide-Weight Management 2.4 MG/0.75ML solution auto-injector 0.75 mL, Subcutaneous, Weekly    tiZANidine (ZANAFLEX) 2 mg, Oral, Nightly PRN    traZODone (DESYREL) 50 mg, Oral, Every Night at Bedtime    triamcinolone (KENALOG) 0.1 % ointment        Medications Discontinued During This Encounter   Medication Reason    atomoxetine (Strattera) 40 MG capsule     tiZANidine (ZANAFLEX) 2 MG tablet Reorder

## 2024-12-19 ENCOUNTER — PRIOR AUTHORIZATION (OUTPATIENT)
Dept: INTERNAL MEDICINE | Facility: CLINIC | Age: 49
End: 2024-12-19
Payer: COMMERCIAL

## 2024-12-19 PROBLEM — U07.1 COVID-19: Status: RESOLVED | Noted: 2022-01-13 | Resolved: 2024-12-19

## 2024-12-19 PROBLEM — R53.83 OTHER FATIGUE: Status: RESOLVED | Noted: 2023-08-21 | Resolved: 2024-12-19

## 2024-12-19 PROBLEM — R21 RASH AND NONSPECIFIC SKIN ERUPTION: Status: RESOLVED | Noted: 2023-08-21 | Resolved: 2024-12-19

## 2024-12-19 NOTE — TELEPHONE ENCOUNTER
Methylphenidate HCl ER TBCR 18MG tablets    The authorization is valid from 11/19/2024 through 12/19/2025. A letter of explanation will also be  mailed to the patient.

## 2025-01-23 DIAGNOSIS — F90.2 ATTENTION DEFICIT HYPERACTIVITY DISORDER (ADHD), COMBINED TYPE: ICD-10-CM

## 2025-01-23 RX ORDER — METHYLPHENIDATE HYDROCHLORIDE 18 MG/1
18 TABLET ORAL EVERY MORNING
Qty: 30 TABLET | Refills: 0 | Status: SHIPPED | OUTPATIENT
Start: 2025-01-23

## 2025-01-23 NOTE — TELEPHONE ENCOUNTER
Last follow up visit date: 12/18/24    Last urine drug screen date: n/a    Last consent/contract date: 12/18/24    Does patient utilize [facility] pharmacy (yes or no)?

## 2025-01-30 ENCOUNTER — TELEPHONE (OUTPATIENT)
Dept: INTERNAL MEDICINE | Facility: CLINIC | Age: 50
End: 2025-01-30
Payer: COMMERCIAL

## 2025-01-30 NOTE — TELEPHONE ENCOUNTER
Caller: Keerthi Pillai    Relationship to patient: Self      Best call back number: [phone]     Provider:      Medication PA needed: CELEBREX     Reason for call/Prior Auth: PHARMACY IS NEEDING  A PRIOR AUTHORIZATION

## 2025-02-06 ENCOUNTER — PRIOR AUTHORIZATION (OUTPATIENT)
Dept: INTERNAL MEDICINE | Facility: CLINIC | Age: 50
End: 2025-02-06
Payer: COMMERCIAL

## 2025-02-06 NOTE — TELEPHONE ENCOUNTER
Celecoxib 200MG capsules    The authorization is valid from 01/07/2025 through 02/06/2026. A letter of explanation will also be  mailed to the patient.

## 2025-02-21 DIAGNOSIS — F90.2 ATTENTION DEFICIT HYPERACTIVITY DISORDER (ADHD), COMBINED TYPE: ICD-10-CM

## 2025-02-21 NOTE — TELEPHONE ENCOUNTER
Rx Refill Note  Requested Prescriptions     Pending Prescriptions Disp Refills    methylphenidate (Concerta) 18 MG CR tablet 30 tablet 0     Sig: Take 1 tablet by mouth Every Morning      Last office visit with prescribing clinician: 12/18/2024   Last telemedicine visit with prescribing clinician: Visit date not found   Next office visit with prescribing clinician: Visit date not found                         Would you like a call back once the refill request has been completed: [] Yes [] No    If the office needs to give you a call back, can they leave a voicemail: [] Yes [] No    Malvin Quezada MA  02/21/25, 13:48 EST

## 2025-02-21 NOTE — TELEPHONE ENCOUNTER
Last follow up visit date: 12/18/24    Last urine drug screen date: N/A    Last consent/contract date: 12/18/24    Does patient utilize [facility] pharmacy (yes or no)?

## 2025-02-24 RX ORDER — METHYLPHENIDATE HYDROCHLORIDE 18 MG/1
18 TABLET ORAL EVERY MORNING
Qty: 30 TABLET | Refills: 0 | Status: SHIPPED | OUTPATIENT
Start: 2025-02-24

## 2025-02-24 NOTE — TELEPHONE ENCOUNTER
From: Nolberto Koenig  To: Kacey Liu MD  Sent: 11/17/2020 8:55 PM CST  Subject: Other    Please note in my chart that I had the Fluzone High-Dose Quad 2020-21 immunization on 9/18/2020. sent

## 2025-02-28 ENCOUNTER — TELEPHONE (OUTPATIENT)
Dept: INTERNAL MEDICINE | Facility: CLINIC | Age: 50
End: 2025-02-28
Payer: COMMERCIAL

## 2025-02-28 NOTE — TELEPHONE ENCOUNTER
Caller: Keerthi Pillai    Relationship to patient: Self    Best call back number:   Telephone Information:   Mobile [phone]        Chief complaint: 3 MONTH FOLLOW UP    Type of visit: OFFICE VISIT     Requested date: ANYTIME IN MARCH

## 2025-03-20 ENCOUNTER — OFFICE VISIT (OUTPATIENT)
Dept: INTERNAL MEDICINE | Facility: CLINIC | Age: 50
End: 2025-03-20
Payer: COMMERCIAL

## 2025-03-20 VITALS
BODY MASS INDEX: 24.77 KG/M2 | SYSTOLIC BLOOD PRESSURE: 112 MMHG | HEIGHT: 61 IN | RESPIRATION RATE: 13 BRPM | OXYGEN SATURATION: 98 % | DIASTOLIC BLOOD PRESSURE: 64 MMHG | TEMPERATURE: 99.4 F | WEIGHT: 131.2 LBS | HEART RATE: 70 BPM

## 2025-03-20 DIAGNOSIS — M54.9 UPPER BACK PAIN ON LEFT SIDE: Primary | ICD-10-CM

## 2025-03-20 DIAGNOSIS — R05.2 SUBACUTE COUGH: ICD-10-CM

## 2025-03-20 PROCEDURE — 99213 OFFICE O/P EST LOW 20 MIN: CPT | Performed by: NURSE PRACTITIONER

## 2025-03-20 NOTE — PROGRESS NOTES
"Chief Complaint  Cough (Cough is better. PT states she has been coughing since the 12th of February. ) and Back Pain (Under arm on left side into back. /PT states it has gotten worse since she was sick in February. /Pain level is about a 4.)      Subjective      History of Present Illness  The patient is a 50-year-old female presenting with a persistent cough since mid-February and left axillary pain radiating to the back, which has worsened since the onset of the cough.    The cough has persisted for 5-6 weeks, initially showing improvement, then worsening, and is now showing signs of improvement again. The patient experienced malaise on 03/13/2025, necessitating a day off work on 03/14/2025 for rest, after which she regained functionality. No medical treatment or antibiotics have been sought.    The patient reports persistent pain in the left upper back extending around the rib cage, which prevents her from leaning back and causes tenderness. The pain is exacerbated by coughing and is described as feeling like breathing through sand, making deep breaths difficult. She denies experiencing shortness of breath, fevers, chills, or body aches in the past week. There has been no popping sensation associated with the cough/brathing. Initial wheezing has subsided. The patient is currently on a daily regimen of Celebrex.             Objective   Vital Signs:   Vitals:    03/20/25 0814   BP: 112/64   BP Location: Left arm   Patient Position: Sitting   Cuff Size: Adult   Pulse: 70   Resp: 13   Temp: 99.4 °F (37.4 °C)   TempSrc: Temporal   SpO2: 98%   Weight: 59.5 kg (131 lb 3.2 oz)   Height: 154.9 cm (60.98\")     Body mass index is 24.81 kg/m².    Wt Readings from Last 3 Encounters:   03/20/25 59.5 kg (131 lb 3.2 oz)   12/18/24 62.2 kg (137 lb 3.2 oz)   09/18/24 64.9 kg (143 lb)     BP Readings from Last 3 Encounters:   03/20/25 112/64   12/18/24 116/64   09/18/24 118/82       Health Maintenance   Topic Date Due    ANNUAL " PHYSICAL  Never done    PAP SMEAR  05/03/2024    COVID-19 Vaccine (4 - 2024-25 season) 09/01/2024    INFLUENZA VACCINE  03/31/2025 (Originally 7/1/2024)    COLORECTAL CANCER SCREENING  06/07/2028    TDAP/TD VACCINES (2 - Td or Tdap) 01/27/2033    HEPATITIS C SCREENING  Completed    Pneumococcal Vaccine 0-49  Aged Out    MAMMOGRAM  Discontinued       Physical Exam  Vitals and nursing note reviewed.   Constitutional:       General: She is not in acute distress.     Appearance: Normal appearance.   HENT:      Head: Normocephalic and atraumatic.      Right Ear: External ear normal.      Left Ear: External ear normal.      Nose: Nose normal.      Mouth/Throat:      Mouth: Mucous membranes are moist.   Eyes:      Conjunctiva/sclera: Conjunctivae normal.   Cardiovascular:      Rate and Rhythm: Normal rate and regular rhythm.      Pulses: Normal pulses.      Heart sounds: Normal heart sounds. No murmur heard.     No friction rub. No gallop.   Pulmonary:      Effort: Pulmonary effort is normal. No respiratory distress.      Breath sounds: No wheezing, rhonchi or rales.      Comments: Tenderness ribs of left upper back and axillary, no popping or flail chest  Musculoskeletal:      Cervical back: Neck supple.      Right lower leg: No edema.      Left lower leg: No edema.   Skin:     General: Skin is warm and dry.   Neurological:      General: No focal deficit present.      Mental Status: She is alert and oriented to person, place, and time.   Psychiatric:         Mood and Affect: Mood normal.         Behavior: Behavior normal.        Physical Exam        Result Review :  The following data was reviewed by: ANATOLIY Salcedo on 03/20/2025:         Results      BMI is within normal parameters. No other follow-up for BMI required.       Procedures            Assessment & Plan  Upper back pain on left side    Orders:    XR Chest PA & Lateral (In Office)    Subacute cough    Orders:    XR Chest PA & Lateral (In Office)          Assessment & Plan  1. Persistent cough  - Cough since mid-February, recently improving  - Chest x-ray today to rule out atypical pneumonia or other conditions  - Continue Celebrex  - If x-ray normal, expect resolution in weeks  - If persists or worsens, consider further evaluation and antibiotics    2. Left axillary/rib pain  - Pain worsens with cough, described as breathing through sand  - Possible costochondritis from coughing  - Continue Celebrex daily  - If persists or worsens in 2-3 weeks, further evaluation needed  - splinting as needed with coughing    Patient or patient representative verbalized consent for the use of Ambient Listening during the visit with  ANATOLIY Salcedo for chart documentation. 3/20/2025  09:32 EDT      FOLLOW UP  No follow-ups on file.  Patient was given instructions and counseling regarding her condition or for health maintenance advice. Please see specific information pulled into the AVS if appropriate.     ANATOLIY Salcedo  03/20/25  09:33 EDT    CURRENT & DISCONTINUED MEDICATIONS  Current Outpatient Medications   Medication Instructions    Allergy Relief 180 mg, Oral, Daily    azelastine (ASTELIN) 0.1 % nasal spray 2 sprays, Nasal, 2 Times Daily, Use in each nostril as directed    benzonatate (TESSALON) 200 mg, Oral, 3 Times Daily PRN    celecoxib (CELEBREX) 200 mg, Oral, Daily    desvenlafaxine (PRISTIQ) 100 mg, Oral, Daily    estradiol-norethindrone (ACTIVELLA) 1-0.5 MG per tablet 1 tablet, Oral, Daily    fluticasone (FLONASE) 50 MCG/ACT nasal spray 2 sprays, Nasal, Daily    lisinopril (PRINIVIL,ZESTRIL) 20 MG tablet TAKE ONE TABLET BY MOUTH EVERY DAY    methylphenidate (CONCERTA) 18 mg, Oral, Every Morning    mupirocin (BACTROBAN) 2 % ointment     Semaglutide-Weight Management 2.4 MG/0.75ML solution auto-injector 0.75 mL, Subcutaneous, Weekly    tiZANidine (ZANAFLEX) 2 mg, Oral, Nightly PRN    traZODone (DESYREL) 50 mg, Oral, Every Night at Bedtime    triamcinolone  (KENALOG) 0.1 % ointment        There are no discontinued medications.

## 2025-03-24 RX ORDER — TRAZODONE HYDROCHLORIDE 50 MG/1
50 TABLET ORAL
Qty: 90 TABLET | Refills: 1 | Status: SHIPPED | OUTPATIENT
Start: 2025-03-24

## 2025-03-24 RX ORDER — CELECOXIB 200 MG/1
CAPSULE ORAL
Qty: 90 CAPSULE | Refills: 3 | Status: SHIPPED | OUTPATIENT
Start: 2025-03-24

## 2025-03-25 ENCOUNTER — OFFICE VISIT (OUTPATIENT)
Dept: INTERNAL MEDICINE | Facility: CLINIC | Age: 50
End: 2025-03-25
Payer: COMMERCIAL

## 2025-03-25 ENCOUNTER — RESULTS FOLLOW-UP (OUTPATIENT)
Dept: INTERNAL MEDICINE | Facility: CLINIC | Age: 50
End: 2025-03-25

## 2025-03-25 VITALS
OXYGEN SATURATION: 100 % | RESPIRATION RATE: 20 BRPM | HEART RATE: 76 BPM | WEIGHT: 131 LBS | DIASTOLIC BLOOD PRESSURE: 82 MMHG | BODY MASS INDEX: 24.73 KG/M2 | HEIGHT: 61 IN | SYSTOLIC BLOOD PRESSURE: 118 MMHG | TEMPERATURE: 98.6 F

## 2025-03-25 DIAGNOSIS — D50.9 IRON DEFICIENCY ANEMIA, UNSPECIFIED IRON DEFICIENCY ANEMIA TYPE: ICD-10-CM

## 2025-03-25 DIAGNOSIS — F90.2 ATTENTION DEFICIT HYPERACTIVITY DISORDER (ADHD), COMBINED TYPE: ICD-10-CM

## 2025-03-25 DIAGNOSIS — F33.0 MAJOR DEPRESSIVE DISORDER, RECURRENT, MILD: ICD-10-CM

## 2025-03-25 DIAGNOSIS — R53.82 CHRONIC FATIGUE: Primary | ICD-10-CM

## 2025-03-25 DIAGNOSIS — Z14.8 HEMOCHROMATOSIS CARRIER: ICD-10-CM

## 2025-03-25 DIAGNOSIS — Z14.8 HEMOCHROMATOSIS CARRIER: Primary | ICD-10-CM

## 2025-03-25 LAB
ALBUMIN SERPL-MCNC: 3.6 G/DL (ref 3.5–5.2)
ALBUMIN/GLOB SERPL: 1.3 G/DL
ALP SERPL-CCNC: 55 U/L (ref 39–117)
ALT SERPL W P-5'-P-CCNC: 18 U/L (ref 1–33)
ANION GAP SERPL CALCULATED.3IONS-SCNC: 9.7 MMOL/L (ref 5–15)
AST SERPL-CCNC: 25 U/L (ref 1–32)
BASOPHILS # BLD AUTO: 0.05 10*3/MM3 (ref 0–0.2)
BASOPHILS NFR BLD AUTO: 0.9 % (ref 0–1.5)
BILIRUB SERPL-MCNC: 0.5 MG/DL (ref 0–1.2)
BUN SERPL-MCNC: 15 MG/DL (ref 6–20)
BUN/CREAT SERPL: 25.9 (ref 7–25)
CALCIUM SPEC-SCNC: 8.5 MG/DL (ref 8.6–10.5)
CHLORIDE SERPL-SCNC: 105 MMOL/L (ref 98–107)
CO2 SERPL-SCNC: 24.3 MMOL/L (ref 22–29)
CREAT SERPL-MCNC: 0.58 MG/DL (ref 0.57–1)
DEPRECATED RDW RBC AUTO: 41.9 FL (ref 37–54)
EGFRCR SERPLBLD CKD-EPI 2021: 111.1 ML/MIN/1.73
EOSINOPHIL # BLD AUTO: 0.06 10*3/MM3 (ref 0–0.4)
EOSINOPHIL NFR BLD AUTO: 1.1 % (ref 0.3–6.2)
ERYTHROCYTE [DISTWIDTH] IN BLOOD BY AUTOMATED COUNT: 11.8 % (ref 12.3–15.4)
FERRITIN SERPL-MCNC: 552 NG/ML (ref 13–150)
GLOBULIN UR ELPH-MCNC: 2.8 GM/DL
GLUCOSE SERPL-MCNC: 82 MG/DL (ref 65–99)
HCT VFR BLD AUTO: 35 % (ref 34–46.6)
HGB BLD-MCNC: 11.9 G/DL (ref 12–15.9)
IMM GRANULOCYTES # BLD AUTO: 0.02 10*3/MM3 (ref 0–0.05)
IMM GRANULOCYTES NFR BLD AUTO: 0.4 % (ref 0–0.5)
LYMPHOCYTES # BLD AUTO: 1.24 10*3/MM3 (ref 0.7–3.1)
LYMPHOCYTES NFR BLD AUTO: 22.4 % (ref 19.6–45.3)
MCH RBC QN AUTO: 33.7 PG (ref 26.6–33)
MCHC RBC AUTO-ENTMCNC: 34 G/DL (ref 31.5–35.7)
MCV RBC AUTO: 99.2 FL (ref 79–97)
MONOCYTES # BLD AUTO: 0.54 10*3/MM3 (ref 0.1–0.9)
MONOCYTES NFR BLD AUTO: 9.8 % (ref 5–12)
NEUTROPHILS NFR BLD AUTO: 3.62 10*3/MM3 (ref 1.7–7)
NEUTROPHILS NFR BLD AUTO: 65.4 % (ref 42.7–76)
NRBC BLD AUTO-RTO: 0 /100 WBC (ref 0–0.2)
PLATELET # BLD AUTO: 233 10*3/MM3 (ref 140–450)
PMV BLD AUTO: 11.5 FL (ref 6–12)
POTASSIUM SERPL-SCNC: 3.7 MMOL/L (ref 3.5–5.2)
PROT SERPL-MCNC: 6.4 G/DL (ref 6–8.5)
RBC # BLD AUTO: 3.53 10*6/MM3 (ref 3.77–5.28)
SODIUM SERPL-SCNC: 139 MMOL/L (ref 136–145)
WBC NRBC COR # BLD AUTO: 5.53 10*3/MM3 (ref 3.4–10.8)

## 2025-03-25 PROCEDURE — 85025 COMPLETE CBC W/AUTO DIFF WBC: CPT | Performed by: INTERNAL MEDICINE

## 2025-03-25 PROCEDURE — 80053 COMPREHEN METABOLIC PANEL: CPT | Performed by: INTERNAL MEDICINE

## 2025-03-25 PROCEDURE — 82728 ASSAY OF FERRITIN: CPT | Performed by: INTERNAL MEDICINE

## 2025-03-25 RX ORDER — METHYLPHENIDATE HYDROCHLORIDE 27 MG/1
27 TABLET ORAL EVERY MORNING
Qty: 30 TABLET | Refills: 0 | Status: SHIPPED | OUTPATIENT
Start: 2025-03-25

## 2025-03-25 RX ORDER — FEXOFENADINE HYDROCHLORIDE 180 MG/1
180 TABLET, FILM COATED ORAL DAILY
Qty: 90 TABLET | Refills: 1 | Status: SHIPPED | OUTPATIENT
Start: 2025-03-25

## 2025-03-25 NOTE — PROGRESS NOTES
"Chief Complaint  ADHD (3 mo f/u )      Subjective      History of Present Illness  The patient presents for evaluation of anxiety, depression, ADHD, and weight management.    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 She is on Pristiq, finds it beneficial, and is considering counseling. She previously sought counseling in , 2016, and  after therapy and surgery. She has a history of traumatic events. She manages well with daily Concerta but struggles with focus on non-work days. She runs three times a week, which helps. She is not active duty.    She discontinued Wegovy a month ago due to cost and gained 6 pounds. She seeks a refill of Wegovy and requests a work-from-home arrangement. Gardening activities help with strength training.           Objective   Vital Signs:   Vitals:    25 0803   BP: 118/82   BP Location: Left arm   Patient Position: Sitting   Cuff Size: Adult   Pulse: 76   Resp: 20   Temp: 98.6 °F (37 °C)   TempSrc: Temporal   SpO2: 100%   Weight: 59.4 kg (131 lb)   Height: 154.9 cm (60.98\")     Body mass index is 24.77 kg/m².    Wt Readings from Last 3 Encounters:   25 59.4 kg (131 lb)   25 59.5 kg (131 lb 3.2 oz)   24 62.2 kg (137 lb 3.2 oz)     BP Readings from Last 3 Encounters:   25 118/82   25 112/64   24 116/64       Health Maintenance   Topic Date Due    ANNUAL PHYSICAL  Never done    PAP SMEAR  2024    COVID-19 Vaccine ( season) 2024    INFLUENZA VACCINE  2025 (Originally 2024)    COLORECTAL CANCER SCREENING  2028    TDAP/TD VACCINES (2 - Td or Tdap) 2033    HEPATITIS C SCREENING  Completed    Pneumococcal Vaccine 0-49  Aged Out    MAMMOGRAM  Discontinued       Physical Exam  Vitals reviewed.   Constitutional:       Appearance: Normal appearance. She is well-developed.   HENT:      Head: " Normocephalic and atraumatic.      Right Ear: External ear normal.      Left Ear: External ear normal.   Eyes:      Conjunctiva/sclera: Conjunctivae normal.      Pupils: Pupils are equal, round, and reactive to light.   Cardiovascular:      Rate and Rhythm: Normal rate and regular rhythm.      Heart sounds: No murmur heard.     No friction rub. No gallop.   Pulmonary:      Effort: Pulmonary effort is normal.      Breath sounds: Normal breath sounds. No wheezing or rhonchi.   Skin:     General: Skin is warm and dry.   Neurological:      Mental Status: She is alert and oriented to person, place, and time.   Psychiatric:         Mood and Affect: Affect normal.         Behavior: Behavior normal.         Thought Content: Thought content normal.        Physical Exam        Result Review :  The following data was reviewed by: Jennifer Ocasio MD on 03/25/2025:         Results      BMI is within normal parameters. No other follow-up for BMI required.       Procedures            Assessment & Plan  Attention deficit hyperactivity disorder (ADHD), combined type      Orders:    methylphenidate (Concerta) 27 MG CR tablet; Take 1 tablet by mouth Every Morning    Chronic fatigue    Orders:    Comprehensive Metabolic Panel    CBC & Differential    Hemochromatosis carrier    Orders:    Comprehensive Metabolic Panel    CBC & Differential    Major depressive disorder, recurrent, mild      Orders:    Comprehensive Metabolic Panel    CBC & Differential         Assessment & Plan  Anxiety  - Significant job-related stress and recent life events  - Pristiq is helpful but may not be sufficient  - Recommended counseling for coping skills  - Provided list of potential counselors  - Advised to continue running three times a week  - Discussed potential benefits of EMDR therapy    Depression  - Pristiq is helpful but may not be sufficient  - Recommended counseling for coping skills  - Provided list of potential counselors  - Advised to  continue running three times a week  - Discussed potential benefits of EMDR therapy    ADHD  - Difficulty focusing, especially at home  - Increase Concerta to 27 mg  - Advised to monitor blood pressure due to potential hypertension risk    Weight management  - Off Wegovy for a month, gained 6 pounds  - Commended for previous weight loss  - Advised balanced diet and muscle-building activities  - Attempt refill of Wegovy at 0.5 mg    Patient or patient representative verbalized consent for the use of Ambient Listening during the visit with  Jennifer Ocasio MD for chart documentation. 3/25/2025  08:56 EDT      FOLLOW UP  Return in about 3 months (around 6/25/2025).  Patient was given instructions and counseling regarding her condition or for health maintenance advice. Please see specific information pulled into the AVS if appropriate.     Jennifer Ocasio MD  03/25/25  09:32 EDT    CURRENT & DISCONTINUED MEDICATIONS  Current Outpatient Medications   Medication Instructions    Allergy Relief 180 mg, Oral, Daily    azelastine (ASTELIN) 0.1 % nasal spray 2 sprays, Nasal, 2 Times Daily, Use in each nostril as directed    benzonatate (TESSALON) 200 mg, Oral, 3 Times Daily PRN    celecoxib (CeleBREX) 200 MG capsule Needs Prior Authorization Take 1 capsule by mouth Daily.    desvenlafaxine (PRISTIQ) 100 mg, Oral, Daily    estradiol-norethindrone (ACTIVELLA) 1-0.5 MG per tablet 1 tablet, Oral, Daily    fluticasone (FLONASE) 50 MCG/ACT nasal spray 2 sprays, Nasal, Daily    lisinopril (PRINIVIL,ZESTRIL) 20 MG tablet TAKE ONE TABLET BY MOUTH EVERY DAY    methylphenidate (CONCERTA) 27 mg, Oral, Every Morning    mupirocin (BACTROBAN) 2 % ointment     tiZANidine (ZANAFLEX) 2 mg, Oral, Nightly PRN    traZODone (DESYREL) 50 mg, Oral, Every Night at Bedtime    triamcinolone (KENALOG) 0.1 % ointment        Medications Discontinued During This Encounter   Medication Reason    Semaglutide-Weight Management 2.4 MG/0.75ML solution  auto-injector     methylphenidate (Concerta) 18 MG CR tablet Reorder

## 2025-04-03 RX ORDER — ESTRADIOL AND NORETHINDRONE ACETATE 1; .5 MG/1; MG/1
1 TABLET ORAL DAILY
Qty: 84 TABLET | Refills: 3 | Status: SHIPPED | OUTPATIENT
Start: 2025-04-03

## 2025-04-22 RX ORDER — LISINOPRIL 20 MG/1
20 TABLET ORAL DAILY
Qty: 90 TABLET | Refills: 3 | Status: SHIPPED | OUTPATIENT
Start: 2025-04-22

## 2025-04-22 NOTE — TELEPHONE ENCOUNTER
Rx Refill Note  Requested Prescriptions     Pending Prescriptions Disp Refills    lisinopril (PRINIVIL,ZESTRIL) 20 MG tablet 90 tablet 3     Sig: Take 1 tablet by mouth Daily.      Last office visit with prescribing clinician: 3/25/2025   Last telemedicine visit with prescribing clinician: Visit date not found   Next office visit with prescribing clinician: 6/9/2025                         Would you like a call back once the refill request has been completed: [] Yes [] No    If the office needs to give you a call back, can they leave a voicemail: [] Yes [] No    Malvin Quezada MA  04/22/25, 14:40 EDT

## 2025-04-24 DIAGNOSIS — F90.2 ATTENTION DEFICIT HYPERACTIVITY DISORDER (ADHD), COMBINED TYPE: ICD-10-CM

## 2025-04-24 RX ORDER — METHYLPHENIDATE HYDROCHLORIDE 27 MG/1
27 TABLET ORAL EVERY MORNING
Qty: 30 TABLET | Refills: 0 | Status: SHIPPED | OUTPATIENT
Start: 2025-04-24

## 2025-04-24 NOTE — TELEPHONE ENCOUNTER
Last follow up visit date: 3/25/2025     Last urine drug screen date: 12/18/2024     Last consent/contract date: 12/19/2024     Does patient utilize [facility] (yes or no)? No    [facility]

## 2025-05-19 DIAGNOSIS — F90.2 ATTENTION DEFICIT HYPERACTIVITY DISORDER (ADHD), COMBINED TYPE: ICD-10-CM

## 2025-05-19 NOTE — TELEPHONE ENCOUNTER
Last follow up visit date: 3/25/25     Last urine drug screen date: 12/18/2025     Last consent/contract date: 12/18/25     Does patient utilize [facility] (yes or no)? No    [facility] [place]

## 2025-05-21 RX ORDER — DESVENLAFAXINE 100 MG/1
100 TABLET, EXTENDED RELEASE ORAL DAILY
Qty: 90 TABLET | Refills: 1 | Status: SHIPPED | OUTPATIENT
Start: 2025-05-21

## 2025-05-21 RX ORDER — METHYLPHENIDATE HYDROCHLORIDE 27 MG/1
27 TABLET ORAL EVERY MORNING
Qty: 30 TABLET | Refills: 0 | Status: SHIPPED | OUTPATIENT
Start: 2025-05-21

## 2025-05-21 NOTE — TELEPHONE ENCOUNTER
Rx Refill Note  Requested Prescriptions     Pending Prescriptions Disp Refills    desvenlafaxine (PRISTIQ) 100 MG 24 hr tablet 90 tablet 1     Sig: Take 1 tablet by mouth Daily.      Last office visit with prescribing clinician: 3/25/2025   Last telemedicine visit with prescribing clinician: Visit date not found   Next office visit with prescribing clinician: 6/9/2025                         Would you like a call back once the refill request has been completed: [] Yes [] No    If the office needs to give you a call back, can they leave a voicemail: [] Yes [] No    Malvin Quezada MA  05/21/25, 09:31 EDT

## 2025-06-09 ENCOUNTER — OFFICE VISIT (OUTPATIENT)
Dept: INTERNAL MEDICINE | Facility: CLINIC | Age: 50
End: 2025-06-09
Payer: COMMERCIAL

## 2025-06-09 VITALS
DIASTOLIC BLOOD PRESSURE: 84 MMHG | TEMPERATURE: 99 F | WEIGHT: 134.8 LBS | HEART RATE: 76 BPM | RESPIRATION RATE: 18 BRPM | SYSTOLIC BLOOD PRESSURE: 128 MMHG | BODY MASS INDEX: 25.45 KG/M2 | OXYGEN SATURATION: 98 % | HEIGHT: 61 IN

## 2025-06-09 DIAGNOSIS — R41.840 ATTENTION AND CONCENTRATION DEFICIT: ICD-10-CM

## 2025-06-09 DIAGNOSIS — Z79.899 MEDICATION MANAGEMENT: ICD-10-CM

## 2025-06-09 DIAGNOSIS — R41.840 DIFFICULTY CONCENTRATING: Primary | ICD-10-CM

## 2025-06-09 DIAGNOSIS — R68.89 FORGETFULNESS: ICD-10-CM

## 2025-06-09 DIAGNOSIS — G89.29 CHRONIC BILATERAL LOW BACK PAIN WITHOUT SCIATICA: ICD-10-CM

## 2025-06-09 DIAGNOSIS — D50.9 IRON DEFICIENCY ANEMIA, UNSPECIFIED IRON DEFICIENCY ANEMIA TYPE: ICD-10-CM

## 2025-06-09 DIAGNOSIS — M54.50 LUMBAR SPINE PAIN: ICD-10-CM

## 2025-06-09 DIAGNOSIS — F41.9 ANXIETY: ICD-10-CM

## 2025-06-09 DIAGNOSIS — M54.50 CHRONIC BILATERAL LOW BACK PAIN WITHOUT SCIATICA: ICD-10-CM

## 2025-06-09 LAB
ALBUMIN SERPL-MCNC: 3.8 G/DL (ref 3.5–5.2)
ALBUMIN/GLOB SERPL: 1.4 G/DL
ALP SERPL-CCNC: 65 U/L (ref 39–117)
ALT SERPL W P-5'-P-CCNC: 13 U/L (ref 1–33)
AMPHET+METHAMPHET UR QL: NEGATIVE
AMPHETAMINE INTERNAL CONTROL: NORMAL
AMPHETAMINES UR QL: NEGATIVE
ANION GAP SERPL CALCULATED.3IONS-SCNC: 8 MMOL/L (ref 5–15)
AST SERPL-CCNC: 21 U/L (ref 1–32)
BARBITURATE INTERNAL CONTROL: NORMAL
BARBITURATES UR QL SCN: NEGATIVE
BASOPHILS # BLD AUTO: 0.07 10*3/MM3 (ref 0–0.2)
BASOPHILS NFR BLD AUTO: 1.4 % (ref 0–1.5)
BENZODIAZ UR QL SCN: NEGATIVE
BENZODIAZEPINE INTERNAL CONTROL: NORMAL
BILIRUB SERPL-MCNC: 0.2 MG/DL (ref 0–1.2)
BUN SERPL-MCNC: 10 MG/DL (ref 6–20)
BUN/CREAT SERPL: 14.3 (ref 7–25)
BUPRENORPHINE INTERNAL CONTROL: NORMAL
BUPRENORPHINE SERPL-MCNC: NEGATIVE NG/ML
CALCIUM SPEC-SCNC: 8.6 MG/DL (ref 8.6–10.5)
CANNABINOIDS SERPL QL: NEGATIVE
CHLORIDE SERPL-SCNC: 106 MMOL/L (ref 98–107)
CO2 SERPL-SCNC: 27 MMOL/L (ref 22–29)
COCAINE INTERNAL CONTROL: NORMAL
COCAINE UR QL: NEGATIVE
CREAT SERPL-MCNC: 0.7 MG/DL (ref 0.57–1)
DEPRECATED RDW RBC AUTO: 42.1 FL (ref 37–54)
EGFRCR SERPLBLD CKD-EPI 2021: 106.2 ML/MIN/1.73
EOSINOPHIL # BLD AUTO: 0.08 10*3/MM3 (ref 0–0.4)
EOSINOPHIL NFR BLD AUTO: 1.6 % (ref 0.3–6.2)
ERYTHROCYTE [DISTWIDTH] IN BLOOD BY AUTOMATED COUNT: 11.5 % (ref 12.3–15.4)
EXPIRATION DATE: NORMAL
GLOBULIN UR ELPH-MCNC: 2.7 GM/DL
GLUCOSE SERPL-MCNC: 91 MG/DL (ref 65–99)
HCT VFR BLD AUTO: 36.4 % (ref 34–46.6)
HGB BLD-MCNC: 12.3 G/DL (ref 12–15.9)
IMM GRANULOCYTES # BLD AUTO: 0.01 10*3/MM3 (ref 0–0.05)
IMM GRANULOCYTES NFR BLD AUTO: 0.2 % (ref 0–0.5)
LYMPHOCYTES # BLD AUTO: 1.2 10*3/MM3 (ref 0.7–3.1)
LYMPHOCYTES NFR BLD AUTO: 23.3 % (ref 19.6–45.3)
Lab: NORMAL
MCH RBC QN AUTO: 34 PG (ref 26.6–33)
MCHC RBC AUTO-ENTMCNC: 33.8 G/DL (ref 31.5–35.7)
MCV RBC AUTO: 100.6 FL (ref 79–97)
MDMA (ECSTASY) INTERNAL CONTROL: NORMAL
MDMA UR QL SCN: NEGATIVE
METHADONE INTERNAL CONTROL: NORMAL
METHADONE UR QL SCN: NEGATIVE
METHAMPHETAMINE INTERNAL CONTROL: NORMAL
MONOCYTES # BLD AUTO: 0.36 10*3/MM3 (ref 0.1–0.9)
MONOCYTES NFR BLD AUTO: 7 % (ref 5–12)
MORPHINE INTERNAL CONTROL: NORMAL
MORPHINE/OPIATES SCREEN, URINE: NEGATIVE
NEUTROPHILS NFR BLD AUTO: 3.43 10*3/MM3 (ref 1.7–7)
NEUTROPHILS NFR BLD AUTO: 66.5 % (ref 42.7–76)
NRBC BLD AUTO-RTO: 0 /100 WBC (ref 0–0.2)
OXYCODONE INTERNAL CONTROL: NORMAL
OXYCODONE UR QL SCN: NEGATIVE
PCP UR QL SCN: NEGATIVE
PHENCYCLIDINE INTERNAL CONTROL: NORMAL
PLATELET # BLD AUTO: 226 10*3/MM3 (ref 140–450)
PMV BLD AUTO: 11.5 FL (ref 6–12)
POTASSIUM SERPL-SCNC: 4 MMOL/L (ref 3.5–5.2)
PROT SERPL-MCNC: 6.5 G/DL (ref 6–8.5)
RBC # BLD AUTO: 3.62 10*6/MM3 (ref 3.77–5.28)
SODIUM SERPL-SCNC: 141 MMOL/L (ref 136–145)
THC INTERNAL CONTROL: NORMAL
TSH SERPL DL<=0.05 MIU/L-ACNC: 0.72 UIU/ML (ref 0.27–4.2)
WBC NRBC COR # BLD AUTO: 5.15 10*3/MM3 (ref 3.4–10.8)

## 2025-06-09 PROCEDURE — 80050 GENERAL HEALTH PANEL: CPT | Performed by: INTERNAL MEDICINE

## 2025-06-09 RX ORDER — ARIPIPRAZOLE 2 MG/1
2 TABLET ORAL DAILY
Qty: 90 TABLET | Refills: 1 | Status: SHIPPED | OUTPATIENT
Start: 2025-06-09

## 2025-06-09 RX ORDER — SEMAGLUTIDE 2.4 MG/.75ML
INJECTION, SOLUTION SUBCUTANEOUS WEEKLY
COMMUNITY
Start: 2025-04-22 | End: 2025-06-09

## 2025-06-09 NOTE — PROGRESS NOTES
Chief Complaint  ADHD (3 mo f/u ), Anxiety, Depression, and Weight Management      Subjective      History of Present Illness  The patient presents for evaluation of anxiety, focus and concentration, back pain, and anemia.    She has severe anxiety due to work-related stress and personal changes, including her twins' graduation. Anxiety is worse during her commute, causing difficulty breathing, and less severe while working from home. Deviations from her schedule or leadership briefings exacerbate her anxiety and breathing issues. She struggles with word recall and thought completion, frustrating her children. Initially, concentration medication seemed beneficial, but now she doubts its efficacy. Anxiety has been present for 6 months, and concentration issues for 3 months. Post-chemotherapy, she feels her cognitive function has halved, making sentence formation and daily functioning difficult. Increased anxiety leads to forgetfulness and thought formation issues, further increasing anxiety. Muscle tension from anxiety makes exercise difficult. She is angry at her cancer and chemotherapy, questioning her efforts. Prefers working from home for comfort and control, but office environment disrupts this, increasing stress and anxiety. 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 Ends the day physically and mentally exhausted, causing arguments at home followed by remorse. Recently received reasonable accommodation at work. Has not explored therapy or EMDR. Does not believe she has bipolar disorder. Has not tried Abilify.    She has difficulty staying awake for 3 months. Concentration medicine medication helps her stay awake and focused daily, but missing a dose leads to excessive sleep and difficulty restarting.    She has sharp thoracic back and shoulder pain for 2 weeks. Previously underwent physical therapy and consulted Dr. Larkin, who suggested  "pain management over surgery. Uses foam roller and therapy balls for relief.    She has a colonoscopy appointment in 07/2025. Reports no known blood loss.    FAMILY HISTORY  - Paternal grandmother had dementia and sundowners  - Father has similar symptoms and has taken Chantix for 4 years  - Bipolar disorder prevalent on father's side, including several cousins and aunts  - Mother has started experiencing tics     She has not yet gotten started with a counselor or considered EMDR therapy.    Reviewed last labs which showed elevated ferritin but improved from previous however also very slight anemia and a very slightly decreased calcium that is likely not worrisome.    Objective   Vital Signs:   Vitals:    06/09/25 1114   BP: 128/84   BP Location: Left arm   Patient Position: Sitting   Cuff Size: Adult   Pulse: 76   Resp: 18   Temp: 99 °F (37.2 °C)   TempSrc: Temporal   SpO2: 98%   Weight: 61.1 kg (134 lb 12.8 oz)   Height: 154.9 cm (60.98\")     Body mass index is 25.48 kg/m².    Wt Readings from Last 3 Encounters:   06/09/25 61.1 kg (134 lb 12.8 oz)   03/25/25 59.4 kg (131 lb)   03/20/25 59.5 kg (131 lb 3.2 oz)     BP Readings from Last 3 Encounters:   06/09/25 128/84   03/25/25 118/82   03/20/25 112/64       Health Maintenance   Topic Date Due    ANNUAL PHYSICAL  Never done    PAP SMEAR  05/03/2024    COVID-19 Vaccine (4 - 2024-25 season) 09/01/2024    INFLUENZA VACCINE  07/01/2025    COLORECTAL CANCER SCREENING  06/07/2028    TDAP/TD VACCINES (2 - Td or Tdap) 01/27/2033    HEPATITIS C SCREENING  Completed    Pneumococcal Vaccine 0-49  Aged Out    MAMMOGRAM  Discontinued       Physical Exam  Vitals reviewed.   Constitutional:       Appearance: Normal appearance. She is well-developed.   HENT:      Head: Normocephalic and atraumatic.      Right Ear: External ear normal.      Left Ear: External ear normal.   Eyes:      Conjunctiva/sclera: Conjunctivae normal.      Pupils: Pupils are equal, round, and reactive to " light.   Cardiovascular:      Rate and Rhythm: Normal rate and regular rhythm.      Heart sounds: No murmur heard.     No friction rub. No gallop.   Pulmonary:      Effort: Pulmonary effort is normal.      Breath sounds: Normal breath sounds. No wheezing or rhonchi.   Skin:     General: Skin is warm and dry.   Neurological:      Mental Status: She is alert and oriented to person, place, and time.   Psychiatric:         Mood and Affect: Affect normal.         Behavior: Behavior normal.         Thought Content: Thought content normal.        Physical Exam        Result Review :  The following data was reviewed by: Jennifer Ocasio MD on 06/09/2025:         Results              Procedures            Assessment & Plan  Medication management    Orders:    POC 12 Panel Urine Drug Screen    Anxiety    Orders:    Ambulatory Referral to Psychiatry    Ambulatory Referral to Neurology    Comprehensive Metabolic Panel    CBC & Differential    TSH Rfx On Abnormal To Free T4; Future    Difficulty concentrating    Orders:    Ambulatory Referral to Psychiatry    Ambulatory Referral to Neurology    Forgetfulness    Orders:    Ambulatory Referral to Psychiatry    Ambulatory Referral to Neurology    Comprehensive Metabolic Panel    CBC & Differential    TSH Rfx On Abnormal To Free T4; Future    Chronic bilateral low back pain without sciatica    Orders:    Ambulatory Referral to Pain Management    MRI Thoracic Spine Without Contrast; Future    Lumbar spine pain    Orders:    Ambulatory Referral to Pain Management    Iron deficiency anemia, unspecified iron deficiency anemia type    Orders:    Comprehensive Metabolic Panel    CBC & Differential    TSH Rfx On Abnormal To Free T4; Future    Attention and concentration deficit              Assessment & Plan  Anxiety  - Anxiety significantly impacts daily functioning, focus, and concentration  - Discussed treatment-resistant depression, may need low-dose bipolar medication  - Referral to  psychiatrist Dr. Mae Mercado for evaluation and potential Abilify therapy  - Continue concentration medication daily and monitor blood pressure    Chemo related concentration deficit  - concentration medication helps stay awake and focused daily  - Continue concentration medication daily for consistent symptom management  - Discussed limited effectiveness of current concentration medication on focus and concentration  - Monitor symptoms and report changes    Back pain  - Sharp thoracic back pain, sometimes extending to shoulder  - Order MRI of thoracic spine  - Referral to pain management for evaluation and treatment  - Continue foam roller exercises, stretches, and strengthening exercises    Anemia  - Improved ferritin levels, slight anemia without known blood loss  - Collect blood sample for further analysis  - If anemia stable, proceed with colonoscopy in 07/2025. If worsening, arrange earlier appointment    Memory issues  - Difficulty with memory, focus, and concentration, possibly related to chemotherapy or other conditions  - Referral to neurologist specializing in dementia to rule out early-onset dementia  - Discussed potential impact of ischemic small vessel disease on memory  - Monitor symptoms and report changes    Follow-up in 2 months    Patient or patient representative verbalized consent for the use of Ambient Listening during the visit with  Jennifer Ocasio MD for chart documentation. 6/9/2025  12:30 EDT      FOLLOW UP  Return in about 2 months (around 8/9/2025).  Patient was given instructions and counseling regarding her condition or for health maintenance advice. Please see specific information pulled into the AVS if appropriate.     Jennifer Ocasio MD  06/09/25  12:41 EDT    CURRENT & DISCONTINUED MEDICATIONS  Current Outpatient Medications   Medication Instructions    Allergy Relief 180 mg, Oral, Daily    ARIPiprazole (ABILIFY) 2 mg, Oral, Daily    azelastine (ASTELIN) 0.1 % nasal spray 2  sprays, Nasal, 2 Times Daily, Use in each nostril as directed    benzonatate (TESSALON) 200 mg, Oral, 3 Times Daily PRN    celecoxib (CeleBREX) 200 MG capsule Needs Prior Authorization Take 1 capsule by mouth Daily.    desvenlafaxine (PRISTIQ) 100 mg, Oral, Daily    estradiol-norethindrone (ACTIVELLA) 1-0.5 MG per tablet 1 tablet, Oral, Daily    fluticasone (FLONASE) 50 MCG/ACT nasal spray 2 sprays, Nasal, Daily    lisinopril (PRINIVIL,ZESTRIL) 20 mg, Oral, Daily    methylphenidate 27 mg, Oral, Every Morning    mupirocin (BACTROBAN) 2 % ointment     tiZANidine (ZANAFLEX) 2 mg, Oral, Nightly PRN    traZODone (DESYREL) 50 mg, Oral, Every Night at Bedtime    triamcinolone (KENALOG) 0.1 % ointment        Medications Discontinued During This Encounter   Medication Reason    Wegovy 2.4 MG/0.75ML solution auto-injector

## 2025-06-09 NOTE — ASSESSMENT & PLAN NOTE
Orders:    Ambulatory Referral to Pain Management    MRI Thoracic Spine Without Contrast; Future

## 2025-06-23 NOTE — PROGRESS NOTES
"Chief Complaint  Memory loss    Patient or patient representative verbalized consent for the use of Ambient Listening during the visit with  Ashwin Prakash MD PhD for chart documentation. 6/25/2025  08:15 EDT    Subjective      History of Present Illness:  Keerthi Pillai \"Efrem" is a delightful 50 y.o. right handed female who presents to [facility] referred for memory loss with history of:  Past Medical History:   Diagnosis Date    Acne     Anxiety     Arthritis     Benign essential hypertension     Breast cancer     Cancer     Depression     Forgetfulness     Headache, tension-type     Hemochromatosis 09/03/2018    Lumbago 04/21/2020    low back pain    Migraine     Moderate episode of recurrent major depressive disorder 12/10/2017    will adjust lexapro and wellbutrin and see how she does    Night sweats     Postmenopausal 04/21/2020    Primary osteoarthritis of right knee 03/07/2018    Shingles 2012    above right eye    Sinus trouble     unspecified    Sleep apnea     Spondylolisthesis, lumbar region 04/21/2020   Unaccompanied today.      History of Present Illness  The patient is a 50-year-old female who presents for memory loss.    She reports experiencing memory and cognitive issues, which she attributes to chemotherapy treatment received in 2015 or 2016. These issues improved post-chemotherapy, remained stable from 2019, but have progressively worsened over the past 6 months to a year. She notes these memory lapses both at work and home, often forgetting tasks unless they are written down or set as alarms. Despite being able to perform basic activities independently, she struggles with more complex tasks such as managing household finances and bills, which her  has taken over for the past year and a half. She also reports difficulty with simple arithmetic tasks. She uses a pill box for medication management on weekends and takes her medications " independently without any issues. She owns a cell phone and can make calls and send texts, but admits to misplacing items more frequently than expected. She has become more socially withdrawn, often forgetting names and repeating conversations. She also reports increased irritability and anger over the past 1 to 2 years. She continues to drive without restrictions and reports no safety concerns at home.    She averages 7 hours of sleep per night and experiences daytime fatigue, which has been present for 8 to 9 years. She has been diagnosed with sleep apnea but does not use her CPAP machine.    She has a history of depression, anxiety, suspected bipolar disorder, ADHD, and PTSD. She is scheduled to see a psychiatrist in 12/2025. She rates her stress level as above average.    SOCIAL HISTORY:    Marital Status:  for 21 years    Education Level: Bachelor's degree    Occupations: Works in [employer] at [employer]    Alcohol: Very seldom    Tobacco: Does not smoke cigarettes    Sleep: Averages 7 hours of sleep per night    FAMILY HISTORY  - Paternal grandmother: Dementia  - Father: Suspected dementia  N/A for other conditions or diseases    MEDICATIONS  CURRENT MEDS:  Compazine  Prochlorperazine  Diphenhydramine  Benadryl  Lisinopril  Abilify  Methylphenidate  Desvenlafaxine  Estradiol  Trazodone  Tizanidine      All available pertinent Labs and Imaging personally reviewed, including:    Imaging:    CT Head wo contrast 10/11/2023 for trauma:  Impression  Negative          Labs:  Lab Results   Component Value Date    WBC 6.54 06/24/2025    HGB 12.1 06/24/2025    HCT 35.8 06/24/2025    MCV 98.6 (H) 06/24/2025     06/24/2025     Lab Results   Component Value Date    GLUCOSE 105 (H) 06/24/2025    BUN 12.1 06/24/2025    CREATININE 0.62 06/24/2025     06/24/2025    K 3.6 06/24/2025     06/24/2025    CALCIUM 8.7 06/24/2025    PROTEINTOT 7.0 06/24/2025    ALBUMIN 4.1 06/24/2025    ALT 17 06/24/2025     "AST 22 06/24/2025    ALKPHOS 67 06/24/2025    BILITOT 0.3 06/24/2025    GLOB 2.9 06/24/2025    AGRATIO 1.4 06/24/2025    BCR 19.5 06/24/2025    ANIONGAP 10.9 06/24/2025    EGFR 109.3 06/24/2025     No results found for: \"HGBA1C\"  Lab Results   Component Value Date    TSH 0.724 06/09/2025     Lab Results   Component Value Date    UMYVRMDN67 773 05/31/2023     Lab Results   Component Value Date    FOLATE 7.72 05/31/2023     Lab Results   Component Value Date    CHOL 158 09/18/2024    CHLPL 176 06/10/2019    TRIG 106 09/18/2024    HDL 36 (L) 09/18/2024     (H) 09/18/2024         Objective   Vital Signs:   /61 (BP Location: Right arm, Patient Position: Sitting, Cuff Size: Adult)   Pulse 77   Ht 154.9 cm (61\")   Wt 59.9 kg (132 lb)   SpO2 98%   BMI 24.94 kg/m²     MMSE: 30/30    GENERAL:  GEN: well appearing, no apparent distress, appropriately dressed and groomed.  HEENT: NCAT, nml symm facies, no LNA, no goiter  LUNGS: CTA barby, no wheezes/crackles/rhonchi noted  Card: RRR, no m noted, no carotid bruit, barby rad and dp pulses 2+ with cap refill < 2 sec  EXT: no cce, no rash noted    NEUROLOGICAL:  MS: A+O x 3, language spontaneous, fluent with logical content, no word finding, no repeating or perseveration, reported own and regarded as excellent historian, normal judgement and insight, some anxiety noted;   CN: PERRLA, full excursions in all cardinal directions with smooth pursuits throughout without saccadic breaks or nystagmus noted; horizontal and vertical saccades symmetric and on target. Cover test reveals no phoria. Visual fields full to confrontation. V1-III Light touch symm and intact; symm jaw clench masseter and temporalis bulk and tone, medial and lateral pterygoids intact. Symm forehead crease and grimace. Hearing grossly symm and intact to finger rub. Uvula and soft palate midline rise on gag. Sternocleidomastoids and traps symm and 5/5. Tongue demonstrates no furrowing and extends midline " and into left and right.  MOTOR: no atrophy/drift, normal tone, strength 5/5, no adventitial movements or tremor noted  SENSORY: LT/PP/Vib intact, symm, and wnL for age. Romberg - NEG  COORD: ROSANGELA/FTN/HTS with good rhythm, speed, and amplitude. No ataxia noted.  GAIT: Native very mild wide based gait with good stride, nml symm barby armswing, nml start/stop/turn. Toe and heel walk without difficulty. Tandem walk without difficulty.  DTR's: absent Ajs, o/w 2+ throughout; no clonus, Babinski - Absent         ASSESSMENT & PLAN:    50 y.o. female who presents to [facility]GERY referred for memory loss.         Diagnoses and all orders for this visit:    1. Memory loss (Primary)         Assessment & Plan  1. Memory loss.  Her cognitive function has been declining over the past 6 months to a year, with significant impact on daily activities and social interactions. She scored a perfect 30/30 on the cognitive screen test, indicating that she is currently able to compensate for her deficits. However, as she ages, these compensatory mechanisms may become less effective, particularly if her energy levels are depleted by untreated anxiety, depression, and sleep disturbances. Her long-standing fatigue suggests a possible sleep debt, which could be exacerbating her cognitive issues. The potential side effects of her current medications on cognitive function were discussed. There is no evidence of an underlying dementia process at this time. She was advised to continue using her CPAP machine to manage her sleep apnea. Labs and MRI of the brain were ordered to further evaluate patient's memory loss. Mental health counseling was recommended and she has an appointment pending in SEP 2025. At this point, there is no need to restrict her driving or prescribe memory medication. She was advised to consider establishing a power of  as a precautionary measure.    2. Sleep apnea.  She has a  history of sleep apnea but is not currently using her CPAP machine. She was advised to resume using the CPAP machine to manage her condition and reduce the risk of associated complications such as high blood pressure, depression, stroke, heart attack, and sudden death. If she believes her sleep apnea has resolved, another sleep study can be conducted to confirm this.    3. Depression.  She has a history of depression and is scheduled to see a psychiatrist in December. Counseling was recommended as the primary treatment, with medications to support the counseling process.    4. Anxiety.  She has a history of anxiety and is scheduled to see a psychiatrist in December. Counseling was recommended as the primary treatment, with medications to support the counseling process.    5. Suspected bipolar disorder.  Bipolar disorder has been suspected but not formally diagnosed. She is scheduled to see a psychiatrist in December for further evaluation. Counseling was recommended as the primary treatment, with medications to support the counseling process.    6. Attention deficit hyperactivity disorder (ADHD).  She has a history of ADHD and is scheduled to see a psychiatrist in December. Counseling was recommended as the primary treatment, with medications to support the counseling process.    7. Post-traumatic stress disorder (PTSD).  She has a history of PTSD and is scheduled to see a psychiatrist in December. Counseling was recommended as the primary treatment, with medications to support the counseling process.            Follow Up  Return in about 6 months (around 12/25/2025) for memory loss review, with labs and MRI results.    40 minutes were spent caring for Keerthi Pillai on this date of service. This time spent by me includes preparing for the visit, reviewing tests, obtaining/reviewing separately obtained history, performing medically appropriate exam/evaluation, counseling/educating the patient/family/caregiver,  ordering medications/tests/procedures, referring/communicating with other health care professionals, documenting information in the medical record, independently interpreting results and communicating that with the patient/family/caregiver and/or care coordination.     Patient was given instructions and counseling regarding her condition or for health maintenance advice. Please see specific information pulled into the AVS if appropriate.

## 2025-06-24 ENCOUNTER — HOSPITAL ENCOUNTER (EMERGENCY)
Facility: HOSPITAL | Age: 50
Discharge: HOME | End: 2025-06-24
Attending: EMERGENCY MEDICINE | Admitting: EMERGENCY MEDICINE
Payer: COMMERCIAL

## 2025-06-24 ENCOUNTER — PATIENT MESSAGE (OUTPATIENT)
Dept: INTERNAL MEDICINE | Facility: CLINIC | Age: 50
End: 2025-06-24
Payer: COMMERCIAL

## 2025-06-24 ENCOUNTER — APPOINTMENT (OUTPATIENT)
Dept: CT IMAGING | Facility: HOSPITAL | Age: 50
End: 2025-06-24
Payer: COMMERCIAL

## 2025-06-24 ENCOUNTER — APPOINTMENT (OUTPATIENT)
Dept: GENERAL RADIOLOGY | Facility: HOSPITAL | Age: 50
End: 2025-06-24
Payer: COMMERCIAL

## 2025-06-24 ENCOUNTER — TELEPHONE (OUTPATIENT)
Dept: INTERNAL MEDICINE | Facility: CLINIC | Age: 50
End: 2025-06-24
Payer: COMMERCIAL

## 2025-06-24 VITALS
WEIGHT: 132.28 LBS | TEMPERATURE: 98.6 F | HEART RATE: 57 BPM | RESPIRATION RATE: 16 BRPM | OXYGEN SATURATION: 98 % | HEIGHT: 61 IN | BODY MASS INDEX: 24.97 KG/M2 | SYSTOLIC BLOOD PRESSURE: 125 MMHG | DIASTOLIC BLOOD PRESSURE: 70 MMHG

## 2025-06-24 DIAGNOSIS — R51.9 NONINTRACTABLE HEADACHE, UNSPECIFIED CHRONICITY PATTERN, UNSPECIFIED HEADACHE TYPE: ICD-10-CM

## 2025-06-24 DIAGNOSIS — I10 PRIMARY HYPERTENSION: Primary | ICD-10-CM

## 2025-06-24 DIAGNOSIS — R68.89 FORGETFULNESS: ICD-10-CM

## 2025-06-24 LAB
ALBUMIN SERPL-MCNC: 4.1 G/DL (ref 3.5–5.2)
ALBUMIN/GLOB SERPL: 1.4 G/DL
ALP SERPL-CCNC: 67 U/L (ref 39–117)
ALT SERPL W P-5'-P-CCNC: 17 U/L (ref 1–33)
ANION GAP SERPL CALCULATED.3IONS-SCNC: 10.9 MMOL/L (ref 5–15)
AST SERPL-CCNC: 22 U/L (ref 1–32)
BASOPHILS # BLD AUTO: 0.09 10*3/MM3 (ref 0–0.2)
BASOPHILS NFR BLD AUTO: 1.4 % (ref 0–1.5)
BILIRUB SERPL-MCNC: 0.3 MG/DL (ref 0–1.2)
BUN SERPL-MCNC: 12.1 MG/DL (ref 6–20)
BUN/CREAT SERPL: 19.5 (ref 7–25)
CALCIUM SPEC-SCNC: 8.7 MG/DL (ref 8.6–10.5)
CHLORIDE SERPL-SCNC: 104 MMOL/L (ref 98–107)
CO2 SERPL-SCNC: 24.1 MMOL/L (ref 22–29)
CREAT SERPL-MCNC: 0.62 MG/DL (ref 0.57–1)
DEPRECATED RDW RBC AUTO: 41.7 FL (ref 37–54)
EGFRCR SERPLBLD CKD-EPI 2021: 109.3 ML/MIN/1.73
EOSINOPHIL # BLD AUTO: 0.07 10*3/MM3 (ref 0–0.4)
EOSINOPHIL NFR BLD AUTO: 1.1 % (ref 0.3–6.2)
ERYTHROCYTE [DISTWIDTH] IN BLOOD BY AUTOMATED COUNT: 11.6 % (ref 12.3–15.4)
GEN 5 1HR TROPONIN T REFLEX: <6 NG/L
GLOBULIN UR ELPH-MCNC: 2.9 GM/DL
GLUCOSE SERPL-MCNC: 105 MG/DL (ref 65–99)
HCT VFR BLD AUTO: 35.8 % (ref 34–46.6)
HGB BLD-MCNC: 12.1 G/DL (ref 12–15.9)
HOLD SPECIMEN: NORMAL
HOLD SPECIMEN: NORMAL
IMM GRANULOCYTES # BLD AUTO: 0.02 10*3/MM3 (ref 0–0.05)
IMM GRANULOCYTES NFR BLD AUTO: 0.3 % (ref 0–0.5)
LIPASE SERPL-CCNC: 32 U/L (ref 13–60)
LYMPHOCYTES # BLD AUTO: 1.56 10*3/MM3 (ref 0.7–3.1)
LYMPHOCYTES NFR BLD AUTO: 23.9 % (ref 19.6–45.3)
MAGNESIUM SERPL-MCNC: 1.7 MG/DL (ref 1.6–2.6)
MCH RBC QN AUTO: 33.3 PG (ref 26.6–33)
MCHC RBC AUTO-ENTMCNC: 33.8 G/DL (ref 31.5–35.7)
MCV RBC AUTO: 98.6 FL (ref 79–97)
MONOCYTES # BLD AUTO: 0.44 10*3/MM3 (ref 0.1–0.9)
MONOCYTES NFR BLD AUTO: 6.7 % (ref 5–12)
NEUTROPHILS NFR BLD AUTO: 4.36 10*3/MM3 (ref 1.7–7)
NEUTROPHILS NFR BLD AUTO: 66.6 % (ref 42.7–76)
NRBC BLD AUTO-RTO: 0 /100 WBC (ref 0–0.2)
NT-PROBNP SERPL-MCNC: 51.6 PG/ML (ref 0–450)
PLATELET # BLD AUTO: 267 10*3/MM3 (ref 140–450)
PMV BLD AUTO: 10.6 FL (ref 6–12)
POTASSIUM SERPL-SCNC: 3.6 MMOL/L (ref 3.5–5.2)
PROT SERPL-MCNC: 7 G/DL (ref 6–8.5)
QT INTERVAL: 411 MS
QTC INTERVAL: 457 MS
RBC # BLD AUTO: 3.63 10*6/MM3 (ref 3.77–5.28)
SODIUM SERPL-SCNC: 139 MMOL/L (ref 136–145)
TROPONIN T NUMERIC DELTA: NORMAL
TROPONIN T SERPL HS-MCNC: <6 NG/L
WBC NRBC COR # BLD AUTO: 6.54 10*3/MM3 (ref 3.4–10.8)
WHOLE BLOOD HOLD COAG: NORMAL
WHOLE BLOOD HOLD SPECIMEN: NORMAL

## 2025-06-24 PROCEDURE — 99284 EMERGENCY DEPT VISIT MOD MDM: CPT

## 2025-06-24 PROCEDURE — 25010000002 KETOROLAC TROMETHAMINE PER 15 MG

## 2025-06-24 PROCEDURE — 36415 COLL VENOUS BLD VENIPUNCTURE: CPT

## 2025-06-24 PROCEDURE — 96375 TX/PRO/DX INJ NEW DRUG ADDON: CPT

## 2025-06-24 PROCEDURE — 80053 COMPREHEN METABOLIC PANEL: CPT

## 2025-06-24 PROCEDURE — 85025 COMPLETE CBC W/AUTO DIFF WBC: CPT

## 2025-06-24 PROCEDURE — 83735 ASSAY OF MAGNESIUM: CPT

## 2025-06-24 PROCEDURE — 93005 ELECTROCARDIOGRAM TRACING: CPT

## 2025-06-24 PROCEDURE — 96374 THER/PROPH/DIAG INJ IV PUSH: CPT

## 2025-06-24 PROCEDURE — 25010000002 PROCHLORPERAZINE 10 MG/2ML SOLUTION

## 2025-06-24 PROCEDURE — 71045 X-RAY EXAM CHEST 1 VIEW: CPT

## 2025-06-24 PROCEDURE — 83880 ASSAY OF NATRIURETIC PEPTIDE: CPT

## 2025-06-24 PROCEDURE — 84484 ASSAY OF TROPONIN QUANT: CPT

## 2025-06-24 PROCEDURE — 93005 ELECTROCARDIOGRAM TRACING: CPT | Performed by: EMERGENCY MEDICINE

## 2025-06-24 PROCEDURE — 93010 ELECTROCARDIOGRAM REPORT: CPT | Performed by: INTERNAL MEDICINE

## 2025-06-24 PROCEDURE — 25810000003 SODIUM CHLORIDE 0.9 % SOLUTION

## 2025-06-24 PROCEDURE — 83690 ASSAY OF LIPASE: CPT

## 2025-06-24 PROCEDURE — 25010000002 DIPHENHYDRAMINE PER 50 MG

## 2025-06-24 RX ORDER — LISINOPRIL 20 MG/1
40 TABLET ORAL DAILY
Qty: 60 TABLET | Refills: 0 | Status: SHIPPED | OUTPATIENT
Start: 2025-06-24 | End: 2025-06-30

## 2025-06-24 RX ORDER — LISINOPRIL 10 MG/1
40 TABLET ORAL ONCE
Status: COMPLETED | OUTPATIENT
Start: 2025-06-24 | End: 2025-06-24

## 2025-06-24 RX ORDER — ASPIRIN 81 MG/1
324 TABLET, CHEWABLE ORAL ONCE
Status: COMPLETED | OUTPATIENT
Start: 2025-06-24 | End: 2025-06-24

## 2025-06-24 RX ORDER — SODIUM CHLORIDE 0.9 % (FLUSH) 0.9 %
10 SYRINGE (ML) INJECTION AS NEEDED
Status: DISCONTINUED | OUTPATIENT
Start: 2025-06-24 | End: 2025-06-24 | Stop reason: HOSPADM

## 2025-06-24 RX ORDER — PROCHLORPERAZINE EDISYLATE 5 MG/ML
10 INJECTION INTRAMUSCULAR; INTRAVENOUS ONCE
Status: COMPLETED | OUTPATIENT
Start: 2025-06-24 | End: 2025-06-24

## 2025-06-24 RX ORDER — DIPHENHYDRAMINE HYDROCHLORIDE 50 MG/ML
25 INJECTION, SOLUTION INTRAMUSCULAR; INTRAVENOUS ONCE
Status: COMPLETED | OUTPATIENT
Start: 2025-06-24 | End: 2025-06-24

## 2025-06-24 RX ORDER — KETOROLAC TROMETHAMINE 30 MG/ML
30 INJECTION, SOLUTION INTRAMUSCULAR; INTRAVENOUS ONCE
Status: COMPLETED | OUTPATIENT
Start: 2025-06-24 | End: 2025-06-24

## 2025-06-24 RX ADMIN — PROCHLORPERAZINE EDISYLATE 10 MG: 5 INJECTION INTRAMUSCULAR; INTRAVENOUS at 19:24

## 2025-06-24 RX ADMIN — LISINOPRIL 40 MG: 10 TABLET ORAL at 19:23

## 2025-06-24 RX ADMIN — SODIUM CHLORIDE 1000 ML: 9 INJECTION, SOLUTION INTRAVENOUS at 19:22

## 2025-06-24 RX ADMIN — ASPIRIN 324 MG: 81 TABLET, CHEWABLE ORAL at 18:11

## 2025-06-24 RX ADMIN — KETOROLAC TROMETHAMINE 30 MG: 30 INJECTION, SOLUTION INTRAMUSCULAR; INTRAVENOUS at 19:24

## 2025-06-24 RX ADMIN — DIPHENHYDRAMINE HYDROCHLORIDE 25 MG: 50 INJECTION, SOLUTION INTRAMUSCULAR; INTRAVENOUS at 19:24

## 2025-06-24 NOTE — ED PROVIDER NOTES
Time: 5:51 PM EDT  Date of encounter:  2025  Independent Historian/Clinical History and Information was obtained by:   Patient    History is limited by: N/A    Chief Complaint: Chest tightness and headache      History of Present Illness:  Patient is a 50 y.o. year old female who presents to the emergency department for evaluation of chest tightness and headache.  Patient states that she woke up this morning she had some tightness in her chest and felt like some difficulty breathing.  Patient does not have a history of asthma or COPD.  Patient states that as a day went on she began to develop some pain in her head that felt like a pressure and also pressure running up the back sides of her bilateral neck.  Patient states that when this occurred she had some pain running down her left arm.  Patient has not taken anything for symptoms today.  (Bailey Seaver, APRN, FNP-C)      Patient Care Team  Primary Care Provider: Jennifer Ocasio MD    Past Medical History:     Allergies   Allergen Reactions    Augmentin [Amoxicillin-Pot Clavulanate] Hives     Past Medical History:   Diagnosis Date    Acne     Anxiety     Arthritis     Benign essential hypertension     Breast cancer     Cancer     Depression     Forgetfulness     Hemochromatosis 2018    Lumbago 2020    low back pain    Migraine     Moderate episode of recurrent major depressive disorder 12/10/2017    will adjust lexapro and wellbutrin and see how she does    Night sweats     Postmenopausal 2020    Primary osteoarthritis of right knee 2018    Sinus trouble     unspecified    Spondylolisthesis, lumbar region 2020     Past Surgical History:   Procedure Laterality Date    BREAST RECONSTRUCTION      with implants     SECTION      ,     COLONOSCOPY      FAT GRAFTING Bilateral 2019    breasts     FRACTURE SURGERY  19    Collar bone    JOINT REPLACEMENT  23    Rt knee partial placement    KNEE MENISCAL  REPAIR  2012    MASTECTOMY Bilateral     OTHER SURGICAL HISTORY      joint surgery    OTHER SURGICAL HISTORY      port placement     Family History   Problem Relation Age of Onset    Arthritis Mother     Hyperlipidemia Mother     Arthritis Father     Depression Father     Hyperlipidemia Father     Stroke Other     Heart disease Other     Brain cancer Other        Home Medications:  Prior to Admission medications    Medication Sig Start Date End Date Taking? Authorizing Provider   Allergy Relief 180 MG tablet TAKE ONE TABLET BY MOUTH EVERY DAY 3/25/25   Jennifer Ocasio MD   ARIPiprazole (Abilify) 2 MG tablet Take 1 tablet by mouth Daily. 6/9/25   Jennifer Ocasio MD   azelastine (ASTELIN) 0.1 % nasal spray 2 sprays into the nostril(s) as directed by provider 2 (Two) Times a Day. Use in each nostril as directed 8/7/24   Rhina Story MD   benzonatate (TESSALON) 200 MG capsule Take 1 capsule by mouth 3 (Three) Times a Day As Needed for Cough.  Patient taking differently: Take 1 capsule by mouth Daily. 7/17/24   Rani Haskins APRN   celecoxib (CeleBREX) 200 MG capsule Needs Prior Authorization Take 1 capsule by mouth Daily. 3/24/25   Jennifer Ocasio MD   desvenlafaxine (PRISTIQ) 100 MG 24 hr tablet Take 1 tablet by mouth Daily. 5/21/25   Jennifer Ocasio MD   estradiol-norethindrone (ACTIVELLA) 1-0.5 MG per tablet TAKE ONE TABLET BY MOUTH EVERY DAY 4/3/25   Jennifer Ocasio MD   fluticasone (FLONASE) 50 MCG/ACT nasal spray 2 sprays into the nostril(s) as directed by provider Daily. 5/31/23   Jennifer Ocasio MD   lisinopril (PRINIVIL,ZESTRIL) 20 MG tablet Take 1 tablet by mouth Daily. 4/22/25   Jennifer Ocasio MD   methylphenidate 27 MG CR tablet TAKE ONE TABLET BY MOUTH EVERY MORNING 5/21/25   Jennifer Ocasio MD   mupirocin (BACTROBAN) 2 % ointment  11/16/23   Ari Murguia MD   tiZANidine (ZANAFLEX) 2 MG tablet Take 1 tablet by mouth At Night As Needed for Muscle Spasms.  "12/18/24   Jennifer Ocasio MD   traZODone (DESYREL) 50 MG tablet Take ONE tablet by mouth every NIGHT at bedtime 3/24/25   Jennifer Ocasio MD   triamcinolone (KENALOG) 0.1 % ointment  11/29/23   Nieves Aldana PA-C        Social History:   Social History     Tobacco Use    Smoking status: Never    Smokeless tobacco: Never   Vaping Use    Vaping status: Never Used   Substance Use Topics    Alcohol use: Yes     Comment: Ocassional glass of wine, few times a year    Drug use: Never         Review of Systems:  Review of Systems   Constitutional:  Negative for fatigue and fever.   Eyes:  Negative for visual disturbance.   Respiratory:  Positive for chest tightness and shortness of breath.    Cardiovascular:  Negative for chest pain, palpitations and leg swelling.   Neurological:  Positive for dizziness and headaches.   Psychiatric/Behavioral:  Negative for confusion.         Physical Exam:  /70   Pulse 57   Temp 98.6 °F (37 °C)   Resp 16   Ht 154.9 cm (61\")   Wt 60 kg (132 lb 4.4 oz)   LMP  (LMP Unknown)   SpO2 98%   BMI 24.99 kg/m²     Physical Exam  HENT:      Head: Normocephalic.      Mouth/Throat:      Mouth: Mucous membranes are moist.   Eyes:      Pupils: Pupils are equal, round, and reactive to light.   Pulmonary:      Effort: Pulmonary effort is normal.   Abdominal:      General: There is no distension.   Musculoskeletal:      Cervical back: Neck supple.   Skin:     General: Skin is warm and dry.   Neurological:      General: No focal deficit present.      Mental Status: She is alert and oriented to person, place, and time.   Psychiatric:         Mood and Affect: Mood normal.         Behavior: Behavior normal.                  Medical Decision Making:      Comorbidities that affect care:    None    External Notes reviewed:    Previous Clinic Note: Patient was recently seen by her PCP who referred her to neurology she has a follow-up appointment tomorrow      The following orders were placed " and all results were independently analyzed by me:  Orders Placed This Encounter   Procedures    XR Chest 1 View    Slovan Draw    High Sensitivity Troponin T    Comprehensive Metabolic Panel    Lipase    BNP    Magnesium    CBC Auto Differential    High Sensitivity Troponin T 1Hr    NPO Diet NPO Type: Strict NPO    Undress & Gown    Continuous Pulse Oximetry    Oxygen Therapy- Nasal Cannula; Titrate 1-6 LPM Per SpO2; 90 - 95%    Oxygen Therapy- Nasal Cannula; Titrate 1-6 LPM Per SpO2; 90 - 95%    ECG 12 Lead ED Triage Standing Order; Chest Pain    ECG 12 Lead ED Triage Standing Order; Chest Pain    Insert Peripheral IV    Insert Peripheral IV    CBC & Differential    Green Top (Gel)    Lavender Top    Gold Top - SST    Light Blue Top       Medications Given in the Emergency Department:  Medications   sodium chloride 0.9 % flush 10 mL (has no administration in time range)   sodium chloride 0.9 % flush 10 mL (has no administration in time range)   aspirin chewable tablet 324 mg (324 mg Oral Given 6/24/25 1811)   sodium chloride 0.9 % bolus 1,000 mL (0 mL Intravenous Stopped 6/24/25 2050)   ketorolac (TORADOL) injection 30 mg (30 mg Intravenous Given 6/24/25 1924)   prochlorperazine (COMPAZINE) injection 10 mg (10 mg Intravenous Given 6/24/25 1924)   diphenhydrAMINE (BENADRYL) injection 25 mg (25 mg Intravenous Given 6/24/25 1924)   lisinopril (PRINIVIL,ZESTRIL) tablet 40 mg (40 mg Oral Given 6/24/25 1923)        ED Course:    ED Course as of 06/24/25 2159   Tue Jun 24, 2025   1757 PROVIDER IN TRIAGE  Patient was evaluated by me in triage, Bailey Seaver, APRN, FNP-C.  Orders were placed and patient is currently awaiting final results and disposition.   [AS]   2158 Comprehensive Metabolic Panel(!)  The patient´s CMP that was reviewed and interpretted by me shows no abnormalities of critical concern. Of note, the patient´s sodium and potassium are acceptable. The patient´s liver enzymes are unremarkable. The patient´s  renal function (creatinine) is preserved. The patient has a normal anion gap. [AS]   2158 CBC & Differential(!)  The patient´s CBC that was reviewed and interpreted by me shows no abnormalities of critical concern. Of note, there is no anemia requiring a blood transfusion and the platelet count is acceptable. [AS]   2158 XR Chest 1 View  No acute infiltrates [AS]      ED Course User Index  [AS] Seaver, Alyce B, ANATOLIY       Labs:    Lab Results (last 24 hours)       Procedure Component Value Units Date/Time    High Sensitivity Troponin T [[id number]]  (Normal) Collected: 06/24/25 1706    Specimen: Blood Updated: 06/24/25 1811     HS Troponin T <6 ng/L     Narrative:      High Sensitive Troponin T Reference Range:  <14.0 ng/L- Negative Female for AMI  <22.0 ng/L- Negative Male for AMI  >=14 - Abnormal Female indicating possible myocardial injury.  >=22 - Abnormal Male indicating possible myocardial injury.   Clinicians would have to utilize clinical acumen, EKG, Troponin, and serial changes to determine if it is an Acute Myocardial Infarction or myocardial injury due to an underlying chronic condition.         CBC & Differential [[id number]]  (Abnormal) Collected: 06/24/25 1706    Specimen: Blood Updated: 06/24/25 1728    Narrative:      The following orders were created for panel order CBC & Differential.  Procedure                               Abnormality         Status                     ---------                               -----------         ------                     CBC Auto Differential[[id number]]        Abnormal            Final result                 Please view results for these tests on the individual orders.    Comprehensive Metabolic Panel [[id number]]  (Abnormal) Collected: 06/24/25 1706    Specimen: Blood Updated: 06/24/25 1811     Glucose 105 mg/dL      BUN 12.1 mg/dL      Creatinine 0.62 mg/dL      Sodium 139 mmol/L      Potassium 3.6 mmol/L      Chloride 104 mmol/L      CO2 24.1 mmol/L      Calcium  8.7 mg/dL      Total Protein 7.0 g/dL      Albumin 4.1 g/dL      ALT (SGPT) 17 U/L      AST (SGOT) 22 U/L      Alkaline Phosphatase 67 U/L      Total Bilirubin 0.3 mg/dL      Globulin 2.9 gm/dL      A/G Ratio 1.4 g/dL      BUN/Creatinine Ratio 19.5     Anion Gap 10.9 mmol/L      eGFR 109.3 mL/min/1.73     Narrative:      GFR Categories in Chronic Kidney Disease (CKD)              GFR Category          GFR (mL/min/1.73)    Interpretation  G1                    90 or greater        Normal or high (1)  G2                    60-89                Mild decrease (1)  G3a                   45-59                Mild to moderate decrease  G3b                   30-44                Moderate to severe decrease  G4                    15-29                Severe decrease  G5                    14 or less           Kidney failure    (1)In the absence of evidence of kidney disease, neither GFR category G1 or G2 fulfill the criteria for CKD.    eGFR calculation 2021 CKD-EPI creatinine equation, which does not include race as a factor    Lipase [[id number]]  (Normal) Collected: 06/24/25 1706    Specimen: Blood Updated: 06/24/25 1811     Lipase 32 U/L     BNP [[id number]]  (Normal) Collected: 06/24/25 1706    Specimen: Blood Updated: 06/24/25 1807     proBNP 51.6 pg/mL     Narrative:      This assay is used as an aid in the diagnosis of individuals suspected of having heart failure. It can be used as an aid in the diagnosis of acute decompensated heart failure (ADHF) in patients presenting with signs and symptoms of ADHF to the emergency department (ED). In addition, NT-proBNP of <300 pg/mL indicates ADHF is not likely.    Age Range Result Interpretation  NT-proBNP Concentration (pg/mL:      <50             Positive            >450                   Gray                 300-450                    Negative             <300    50-75           Positive            >900                  Gray                300-900                  Negative             <300      >75             Positive            >1800                  Gray                300-1800                  Negative            <300    Magnesium [[id number]]  (Normal) Collected: 06/24/25 1706    Specimen: Blood Updated: 06/24/25 1811     Magnesium 1.7 mg/dL     CBC Auto Differential [[id number]]  (Abnormal) Collected: 06/24/25 1706    Specimen: Blood Updated: 06/24/25 1728     WBC 6.54 10*3/mm3      RBC 3.63 10*6/mm3      Hemoglobin 12.1 g/dL      Hematocrit 35.8 %      MCV 98.6 fL      MCH 33.3 pg      MCHC 33.8 g/dL      RDW 11.6 %      RDW-SD 41.7 fl      MPV 10.6 fL      Platelets 267 10*3/mm3      Neutrophil % 66.6 %      Lymphocyte % 23.9 %      Monocyte % 6.7 %      Eosinophil % 1.1 %      Basophil % 1.4 %      Immature Grans % 0.3 %      Neutrophils, Absolute 4.36 10*3/mm3      Lymphocytes, Absolute 1.56 10*3/mm3      Monocytes, Absolute 0.44 10*3/mm3      Eosinophils, Absolute 0.07 10*3/mm3      Basophils, Absolute 0.09 10*3/mm3      Immature Grans, Absolute 0.02 10*3/mm3      nRBC 0.0 /100 WBC     High Sensitivity Troponin T 1Hr [[id number]] Collected: 06/24/25 1818    Specimen: Blood Updated: 06/24/25 1843     HS Troponin T <6 ng/L      Troponin T Numeric Delta --     Comment: Unable to calculate.       Narrative:      High Sensitive Troponin T Reference Range:  <14.0 ng/L- Negative Female for AMI  <22.0 ng/L- Negative Male for AMI  >=14 - Abnormal Female indicating possible myocardial injury.  >=22 - Abnormal Male indicating possible myocardial injury.   Clinicians would have to utilize clinical acumen, EKG, Troponin, and serial changes to determine if it is an Acute Myocardial Infarction or myocardial injury due to an underlying chronic condition.                  Imaging:    XR Chest 1 View  Result Date: 6/24/2025  XR CHEST 1 VW Date of Exam: 6/24/2025 5:08 PM EDT Indication: Chest Pain Triage Protocol Comparison: Chest radiograph 3/20/2025 Findings: Heart size normal. Negative for  consolidation, large effusion or pneumothorax. Right clavicle ORIF hardware.     Impression: No active pulmonary process. Electronically Signed: Heladio Chao MD  6/24/2025 5:40 PM EDT  Workstation ID: [id number]        Differential Diagnosis and Discussion:    Headache: Differential diagnosis includes but is not limited to migraine, cluster headache, hypertension, tumor, subarachnoid bleeding, pseudotumor cerebri, temporal arteritis, infections, tension headache, and TMJ syndrome.    PROCEDURES:    Labs were collected in the emergency department and all labs were reviewed and interpreted by me.  X-ray were performed in the emergency department and all X-ray impressions were independently interpreted by me.  An EKG was performed and the EKG was interpreted by me.  An EKG was performed and the EKG was interpreted by supervising attending.    ECG 12 Lead ED Triage Standing Order; Chest Pain   Final Result   HEART RATE=74  bpm   RR Vxjrqyhh=044  ms   MI Ejodffkm=803  ms   P Horizontal Axis=9  deg   P Front Axis=-7  deg   QRSD Interval=83  ms   QT Jijljhfv=118  ms   PCfO=049  ms   QRS Axis=27  deg   T Wave Axis=4  deg   - NORMAL ECG -   Sinus rhythm   When compared with ECG of 15-Oct-2019 04:35:07,   No significant change   Electronically Signed By: Heladio Sandoval ([facility]) 2025-06-24 20:57:07   Date and Time of Study:2025-06-24 16:50:11          Procedures    MDM     Amount and/or Complexity of Data Reviewed  Clinical lab tests: reviewed  Tests in the radiology section of CPT®: reviewed  Tests in the medicine section of CPT®: reviewed  Decide to obtain previous medical records or to obtain history from someone other than the patient: yes           Patient Care Considerations:    CT HEAD: I considered ordering a noncontrast CT of the head, however patient is following up with neurology tomorrow who will order head scans if she is having neurological deficits.  I do not believe at this time that she is having an acute  neurological crisis to warrant emergent CT scan.  Risk of radiation does not outweigh the risk of acute issue      Consultants/Shared Management Plan:    SHARED VISIT: I have discussed the case with my supervising physician, Dr. Mandel who states he agrees with plan of care. The substantive portion of the medical decision was made by the attesting physician who made or approve the management plan and will take responsibility for the patient.  Clinical findings were discussed and ultimate disposition was made in consult with supervising physician.    Social Determinants of Health:    Patient is independent, reliable, and has access to care.       Disposition and Care Coordination:    Discharged: The patient is suitable and stable for discharge with no need for consideration of admission.    I have explained the patient´s condition, diagnoses and treatment plan based on the information available to me at this time. I have answered questions and addressed any concerns. The patient has a good  understanding of the patient´s diagnosis, condition, and treatment plan as can be expected at this point. The vital signs have been stable. The patient´s condition is stable and appropriate for discharge from the emergency department.      The patient will pursue further outpatient evaluation with the primary care physician or other designated or consulting physician as outlined in the discharge instructions. They are agreeable to this plan of care and follow-up instructions have been explained in detail. The patient has received these instructions in written format and has expressed an understanding of the discharge instructions. The patient is aware that any significant change in condition or worsening of symptoms should prompt an immediate return to this or the closest emergency department or call to 911.  I have explained discharge medications and the need for follow up with the patient/caretakers. This was also printed in  the discharge instructions. Patient was discharged with the following medications and follow up:      Medication List        Changed      benzonatate 200 MG capsule  Commonly known as: TESSALON  Take 1 capsule by mouth 3 (Three) Times a Day As Needed for Cough.  What changed: when to take this     * lisinopril 20 MG tablet  Commonly known as: PRINIVIL,ZESTRIL  Take 1 tablet by mouth Daily.  What changed: Another medication with the same name was added. Make sure you understand how and when to take each.     * lisinopril 20 MG tablet  Commonly known as: PRINIVIL,ZESTRIL  Take 2 tablets by mouth Daily.  What changed: You were already taking a medication with the same name, and this prescription was added. Make sure you understand how and when to take each.           * This list has 2 medication(s) that are the same as other medications prescribed for you. Read the directions carefully, and ask your doctor or other care provider to review them with you.                   Where to Get Your Medications        These medications were sent to [facility] - [place], [place] - [address] - [phone] [facility] [phone]   [address] [facility] [place] [address]      Phone: [phone]   lisinopril 20 MG tablet      Jennifer Ocasio MD  [address] [place] [address] 3  [place] [address] [phone]             Final diagnoses:   Primary hypertension   Nonintractable headache, unspecified chronicity pattern, unspecified headache type   Forgetfulness        ED Disposition       ED Disposition   Discharge    Condition   Stable    Comment   --               This medical record created using voice recognition software.             Seaver, Alyce B, ANATOLIY  06/24/25 2380       Seaver, Alyce B, ANATOLIY  06/25/25 1011

## 2025-06-24 NOTE — TELEPHONE ENCOUNTER
Patient call office stating Dr Ocasio ask her to keep a BP log, patient sent a OrdrItt message with her bp log but her BP right now was 191/107, I ask pt if she was having any SOA, chest pain, headache, back pain, patient stated no back pain but she did have chest pain, headache and it felt heavy to breath, I advise patient to go to ED due to symptoms and I inform her that I will send a message to the providers that are in the office, I inform patient pcp was out for the next 2 weeks, pt verbalized understanding.

## 2025-06-25 ENCOUNTER — OFFICE VISIT (OUTPATIENT)
Dept: NEUROLOGY | Facility: CLINIC | Age: 50
End: 2025-06-25
Payer: COMMERCIAL

## 2025-06-25 ENCOUNTER — PATIENT ROUNDING (BHMG ONLY) (OUTPATIENT)
Dept: NEUROLOGY | Facility: CLINIC | Age: 50
End: 2025-06-25
Payer: COMMERCIAL

## 2025-06-25 VITALS
WEIGHT: 132 LBS | OXYGEN SATURATION: 98 % | HEIGHT: 61 IN | BODY MASS INDEX: 24.92 KG/M2 | HEART RATE: 77 BPM | DIASTOLIC BLOOD PRESSURE: 61 MMHG | SYSTOLIC BLOOD PRESSURE: 129 MMHG

## 2025-06-25 DIAGNOSIS — R41.3 MEMORY LOSS: Primary | ICD-10-CM

## 2025-06-25 DIAGNOSIS — F90.2 ATTENTION DEFICIT HYPERACTIVITY DISORDER (ADHD), COMBINED TYPE: ICD-10-CM

## 2025-06-25 RX ORDER — METHYLPHENIDATE HYDROCHLORIDE 27 MG/1
27 TABLET ORAL EVERY MORNING
Qty: 30 TABLET | Refills: 0 | Status: SHIPPED | OUTPATIENT
Start: 2025-06-25

## 2025-06-25 NOTE — DISCHARGE INSTRUCTIONS
Follow up with your primary care provider. Return to ER if symptoms worsen or fail to improve.  Keep follow-up appointment with neurology tomorrow.  Begin taking 40 mg of lisinopril daily.

## 2025-06-25 NOTE — ED PROVIDER NOTES
"SHARED VISIT ATTESTATION:    This visit was performed by myself and an APC.  I personally approved the management plan/medical decision making and take responsibility for the patient management.      SHARED VISIT NOTE:    Patient is 50 y.o. year old female that presents to the ED for evaluation of chest tightness and headache.     Physical Exam    ED Course:    /70   Pulse 57   Temp 98.6 °F (37 °C)   Resp 16   Ht 154.9 cm (61\")   Wt 60 kg (132 lb 4.4 oz)   LMP  (LMP Unknown)   SpO2 98%   BMI 24.99 kg/m²       The following orders were placed and all results were independently analyzed by me:  Orders Placed This Encounter   Procedures    XR Chest 1 View    Argillite Draw    High Sensitivity Troponin T    Comprehensive Metabolic Panel    Lipase    BNP    Magnesium    CBC Auto Differential    High Sensitivity Troponin T 1Hr    NPO Diet NPO Type: Strict NPO    Undress & Gown    Continuous Pulse Oximetry    Oxygen Therapy- Nasal Cannula; Titrate 1-6 LPM Per SpO2; 90 - 95%    Oxygen Therapy- Nasal Cannula; Titrate 1-6 LPM Per SpO2; 90 - 95%    ECG 12 Lead ED Triage Standing Order; Chest Pain    ECG 12 Lead ED Triage Standing Order; Chest Pain    Insert Peripheral IV    Insert Peripheral IV    CBC & Differential    Green Top (Gel)    Lavender Top    Gold Top - SST    Light Blue Top       Medications Given in the Emergency Department:  Medications   sodium chloride 0.9 % flush 10 mL (has no administration in time range)   sodium chloride 0.9 % flush 10 mL (has no administration in time range)   aspirin chewable tablet 324 mg (324 mg Oral Given 6/24/25 1811)   sodium chloride 0.9 % bolus 1,000 mL (1,000 mL Intravenous New Bag 6/24/25 1922)   ketorolac (TORADOL) injection 30 mg (30 mg Intravenous Given 6/24/25 1924)   prochlorperazine (COMPAZINE) injection 10 mg (10 mg Intravenous Given 6/24/25 1924)   diphenhydrAMINE (BENADRYL) injection 25 mg (25 mg Intravenous Given 6/24/25 1924)   lisinopril (PRINIVIL,ZESTRIL) " tablet 40 mg (40 mg Oral Given 6/24/25 1923)        ED Course:    ED Course as of 06/25/25 0158   Tue Jun 24, 2025   1757 PROVIDER IN TRIAGE  Patient was evaluated by me in triage, Bailey Seaver, APRN, FNP-C.  Orders were placed and patient is currently awaiting final results and disposition.   [AS]   2158 Comprehensive Metabolic Panel(!)  The patient´s CMP that was reviewed and interpretted by me shows no abnormalities of critical concern. Of note, the patient´s sodium and potassium are acceptable. The patient´s liver enzymes are unremarkable. The patient´s renal function (creatinine) is preserved. The patient has a normal anion gap. [AS]   2158 CBC & Differential(!)  The patient´s CBC that was reviewed and interpreted by me shows no abnormalities of critical concern. Of note, there is no anemia requiring a blood transfusion and the platelet count is acceptable. [AS]   2158 XR Chest 1 View  No acute infiltrates [AS]      ED Course User Index  [AS] Seaver, Alyce B, APRN       Labs:    Lab Results (last 24 hours)       Procedure Component Value Units Date/Time    High Sensitivity Troponin T [[id number]]  (Normal) Collected: 06/24/25 1706    Specimen: Blood Updated: 06/24/25 1811     HS Troponin T <6 ng/L     Narrative:      High Sensitive Troponin T Reference Range:  <14.0 ng/L- Negative Female for AMI  <22.0 ng/L- Negative Male for AMI  >=14 - Abnormal Female indicating possible myocardial injury.  >=22 - Abnormal Male indicating possible myocardial injury.   Clinicians would have to utilize clinical acumen, EKG, Troponin, and serial changes to determine if it is an Acute Myocardial Infarction or myocardial injury due to an underlying chronic condition.         CBC & Differential [[id number]]  (Abnormal) Collected: 06/24/25 1706    Specimen: Blood Updated: 06/24/25 1728    Narrative:      The following orders were created for panel order CBC & Differential.  Procedure                               Abnormality          Status                     ---------                               -----------         ------                     CBC Auto Differential[[id number]]        Abnormal            Final result                 Please view results for these tests on the individual orders.    Comprehensive Metabolic Panel [[id number]]  (Abnormal) Collected: 06/24/25 1706    Specimen: Blood Updated: 06/24/25 1811     Glucose 105 mg/dL      BUN 12.1 mg/dL      Creatinine 0.62 mg/dL      Sodium 139 mmol/L      Potassium 3.6 mmol/L      Chloride 104 mmol/L      CO2 24.1 mmol/L      Calcium 8.7 mg/dL      Total Protein 7.0 g/dL      Albumin 4.1 g/dL      ALT (SGPT) 17 U/L      AST (SGOT) 22 U/L      Alkaline Phosphatase 67 U/L      Total Bilirubin 0.3 mg/dL      Globulin 2.9 gm/dL      A/G Ratio 1.4 g/dL      BUN/Creatinine Ratio 19.5     Anion Gap 10.9 mmol/L      eGFR 109.3 mL/min/1.73     Narrative:      GFR Categories in Chronic Kidney Disease (CKD)              GFR Category          GFR (mL/min/1.73)    Interpretation  G1                    90 or greater        Normal or high (1)  G2                    60-89                Mild decrease (1)  G3a                   45-59                Mild to moderate decrease  G3b                   30-44                Moderate to severe decrease  G4                    15-29                Severe decrease  G5                    14 or less           Kidney failure    (1)In the absence of evidence of kidney disease, neither GFR category G1 or G2 fulfill the criteria for CKD.    eGFR calculation 2021 CKD-EPI creatinine equation, which does not include race as a factor    Lipase [[id number]]  (Normal) Collected: 06/24/25 1706    Specimen: Blood Updated: 06/24/25 1811     Lipase 32 U/L     BNP [[id number]]  (Normal) Collected: 06/24/25 1706    Specimen: Blood Updated: 06/24/25 1807     proBNP 51.6 pg/mL     Narrative:      This assay is used as an aid in the diagnosis of individuals suspected of having heart  failure. It can be used as an aid in the diagnosis of acute decompensated heart failure (ADHF) in patients presenting with signs and symptoms of ADHF to the emergency department (ED). In addition, NT-proBNP of <300 pg/mL indicates ADHF is not likely.    Age Range Result Interpretation  NT-proBNP Concentration (pg/mL:      <50             Positive            >450                   Gray                 300-450                    Negative             <300    50-75           Positive            >900                  Gray                300-900                  Negative            <300      >75             Positive            >1800                  Gray                300-1800                  Negative            <300    Magnesium [[id number]]  (Normal) Collected: 06/24/25 1706    Specimen: Blood Updated: 06/24/25 1811     Magnesium 1.7 mg/dL     CBC Auto Differential [[id number]]  (Abnormal) Collected: 06/24/25 1706    Specimen: Blood Updated: 06/24/25 1728     WBC 6.54 10*3/mm3      RBC 3.63 10*6/mm3      Hemoglobin 12.1 g/dL      Hematocrit 35.8 %      MCV 98.6 fL      MCH 33.3 pg      MCHC 33.8 g/dL      RDW 11.6 %      RDW-SD 41.7 fl      MPV 10.6 fL      Platelets 267 10*3/mm3      Neutrophil % 66.6 %      Lymphocyte % 23.9 %      Monocyte % 6.7 %      Eosinophil % 1.1 %      Basophil % 1.4 %      Immature Grans % 0.3 %      Neutrophils, Absolute 4.36 10*3/mm3      Lymphocytes, Absolute 1.56 10*3/mm3      Monocytes, Absolute 0.44 10*3/mm3      Eosinophils, Absolute 0.07 10*3/mm3      Basophils, Absolute 0.09 10*3/mm3      Immature Grans, Absolute 0.02 10*3/mm3      nRBC 0.0 /100 WBC     High Sensitivity Troponin T 1Hr [[id number]] Collected: 06/24/25 1818    Specimen: Blood Updated: 06/24/25 1843     HS Troponin T <6 ng/L      Troponin T Numeric Delta --     Comment: Unable to calculate.       Narrative:      High Sensitive Troponin T Reference Range:  <14.0 ng/L- Negative Female for AMI  <22.0 ng/L- Negative Male for  AMI  >=14 - Abnormal Female indicating possible myocardial injury.  >=22 - Abnormal Male indicating possible myocardial injury.   Clinicians would have to utilize clinical acumen, EKG, Troponin, and serial changes to determine if it is an Acute Myocardial Infarction or myocardial injury due to an underlying chronic condition.                  Imaging:    XR Chest 1 View  Result Date: 6/24/2025  XR CHEST 1 VW Date of Exam: 6/24/2025 5:08 PM EDT Indication: Chest Pain Triage Protocol Comparison: Chest radiograph 3/20/2025 Findings: Heart size normal. Negative for consolidation, large effusion or pneumothorax. Right clavicle ORIF hardware.     Impression: No active pulmonary process. Electronically Signed: Heladio Chao MD  6/24/2025 5:40 PM EDT  Workstation ID: [id number]      MDM:    Procedures    Labs were collected in the emergency department and all labs were reviewed and interpreted by me.  X-ray were performed in the emergency department and all X-ray impressions were independently interpreted by me.  An EKG was performed and the EKG was interpreted by me.                     Maikol Mandel MD  20:54 EDT  06/24/25         Maikol Mandel MD  06/24/25 0144       Maikol Mandel MD  06/25/25 0158

## 2025-06-25 NOTE — TELEPHONE ENCOUNTER
Last follow up visit date: 6/9/2025    Last urine drug screen date: 6/9/2025    Last consent/contract date: 12/18/2025    Does patient utilize [facility] pharmacy (yes or no)?  no

## 2025-06-26 RX ORDER — FEXOFENADINE HCL 180 MG/1
180 TABLET ORAL DAILY
Qty: 90 TABLET | Refills: 1 | Status: SHIPPED | OUTPATIENT
Start: 2025-06-26

## 2025-06-26 NOTE — TELEPHONE ENCOUNTER
Rx Refill Note  Requested Prescriptions     Pending Prescriptions Disp Refills    fexofenadine (Allergy Relief) 180 MG tablet 90 tablet 1     Sig: Take 1 tablet by mouth Daily.      Last office visit with prescribing clinician: 6/9/2025   Last telemedicine visit with prescribing clinician: Visit date not found   Next office visit with prescribing clinician: 8/15/2025                         Would you like a call back once the refill request has been completed: [] Yes [] No    If the office needs to give you a call back, can they leave a voicemail: [] Yes [] No    Malvin Quezada MA  06/26/25, 15:00 EDT

## 2025-06-27 ENCOUNTER — HOSPITAL ENCOUNTER (OUTPATIENT)
Dept: MRI IMAGING | Facility: HOSPITAL | Age: 50
Discharge: HOME | End: 2025-06-27
Admitting: INTERNAL MEDICINE
Payer: COMMERCIAL

## 2025-06-27 DIAGNOSIS — M54.50 CHRONIC BILATERAL LOW BACK PAIN WITHOUT SCIATICA: ICD-10-CM

## 2025-06-27 DIAGNOSIS — G89.29 CHRONIC BILATERAL LOW BACK PAIN WITHOUT SCIATICA: ICD-10-CM

## 2025-06-27 PROCEDURE — 72146 MRI CHEST SPINE W/O DYE: CPT

## 2025-06-30 ENCOUNTER — TELEPHONE (OUTPATIENT)
Dept: INTERNAL MEDICINE | Facility: CLINIC | Age: 50
End: 2025-06-30
Payer: COMMERCIAL

## 2025-06-30 RX ORDER — LISINOPRIL 40 MG/1
40 TABLET ORAL DAILY
Qty: 30 TABLET | Refills: 0 | Status: SHIPPED | OUTPATIENT
Start: 2025-06-30

## 2025-06-30 NOTE — TELEPHONE ENCOUNTER
Patient call office stating she has been taking lisinopril 40mg since last Tuesday and her bp has not come down, her bp has been running, patient states she has been taking medication 20mg BID.      6/26 182/101, 7:48 pm 146/88  6/27 10:11am 154/89, 2:57pm 187/114, 6:08pm 142/96  6/29 7:41pm 156/97

## 2025-06-30 NOTE — TELEPHONE ENCOUNTER
Prescription for lisinopril 40 sent to the pharmacy.  Would recommend stopping methylphenidate as that can cause blood pressure elevation.  Please schedule an appointment this week to discuss blood pressure medication adjustment.

## 2025-07-03 ENCOUNTER — CLINICAL SUPPORT (OUTPATIENT)
Dept: INTERNAL MEDICINE | Facility: CLINIC | Age: 50
End: 2025-07-03
Payer: COMMERCIAL

## 2025-07-03 VITALS
SYSTOLIC BLOOD PRESSURE: 142 MMHG | OXYGEN SATURATION: 98 % | HEART RATE: 58 BPM | DIASTOLIC BLOOD PRESSURE: 96 MMHG | RESPIRATION RATE: 16 BRPM

## 2025-07-03 DIAGNOSIS — I10 HYPERTENSION, UNSPECIFIED TYPE: Primary | ICD-10-CM

## 2025-07-03 NOTE — PROGRESS NOTES
Blood Pressure Check     Keerthi Pillai, 1975, presents to the clinic for a blood pressure check    Reason for walk in check: Provider Ordered    Patient came into office this afternoon with home BP machine (wrist cuff).  Patient takes BP medication at night; denies any smoking or vaping; minimal caffeine consumption - not even daily at this time.  Recent ED / ER visit on 6/24/25 for elevated BP in home (191/107) and c/o chest pain / pressure.  Negative work-up; BP medication changed at discharge.  Patient confirmed she had a death in the family last night.  Patient stated that last Saturday and Monday she was experiencing difficulty with finding words or being able to form sentences all the way.  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  Patient confirmed that she had worked from home right when the COVID pandemic started up until recently.        Current Outpatient Medications:     ARIPiprazole (Abilify) 2 MG tablet, Take 1 tablet by mouth Daily., Disp: 90 tablet, Rfl: 1    azelastine (ASTELIN) 0.1 % nasal spray, 2 sprays into the nostril(s) as directed by provider 2 (Two) Times a Day. Use in each nostril as directed, Disp: 30 mL, Rfl: 2    benzonatate (TESSALON) 200 MG capsule, Take 1 capsule by mouth 3 (Three) Times a Day As Needed for Cough. (Patient taking differently: Take 1 capsule by mouth Daily.), Disp: 30 capsule, Rfl: 0    celecoxib (CeleBREX) 200 MG capsule, Needs Prior Authorization Take 1 capsule by mouth Daily., Disp: 90 capsule, Rfl: 3    desvenlafaxine (PRISTIQ) 100 MG 24 hr tablet, Take 1 tablet by mouth Daily., Disp: 90 tablet, Rfl: 1    estradiol-norethindrone (ACTIVELLA) 1-0.5 MG per tablet, TAKE ONE TABLET BY MOUTH EVERY DAY, Disp: 84 tablet, Rfl: 3    fexofenadine (Allergy Relief) 180 MG tablet, Take 1 tablet by mouth Daily., Disp: 90 tablet,  Rfl: 1    fluticasone (FLONASE) 50 MCG/ACT nasal spray, 2 sprays into the nostril(s) as directed by provider Daily., Disp: 11.1 mL, Rfl: 1    lisinopril (PRINIVIL,ZESTRIL) 40 MG tablet, Take 1 tablet by mouth Daily., Disp: 30 tablet, Rfl: 0    methylphenidate 27 MG CR tablet, TAKE ONE TABLET BY MOUTH EVERY MORNING, Disp: 30 tablet, Rfl: 0    mupirocin (BACTROBAN) 2 % ointment, , Disp: , Rfl:     tiZANidine (ZANAFLEX) 2 MG tablet, Take 1 tablet by mouth At Night As Needed for Muscle Spasms., Disp: 90 tablet, Rfl: 1    traZODone (DESYREL) 50 MG tablet, Take ONE tablet by mouth every NIGHT at bedtime, Disp: 90 tablet, Rfl: 1    triamcinolone (KENALOG) 0.1 % ointment, , Disp: , Rfl:       Blood Pressure Reading Today: Left arm = 142/96; sitting; manual       Right arm = 142/94; sitting; manual       HR = 58 (normal for patient)       R = 16       O2 = 98% on RA    Symptoms: Headache; if BP elevates she will experience chest pain / tightness.      Previous Readings:   BP Readings from Last 3 Encounters:   07/03/25 142/96   06/25/25 129/61   06/24/25 125/70       Provider Consulted: Dr. Jaquez    Follow-Up Plan: Pt to take BP medication in the morning and keep log.  Education provided to purchase an ARM cuff BP machine for home; gave patient proper measurements for the arm cuff.  Education provided to patient to seek immediate medical attention should symptoms such as elevated BP, chest pain, tightness, pressure, increased headache, dizziness, lightheadedness, N/V occur so as further evaluation and testing may be completed.  RTC on Tuesday, 7/08/2025 for f/u appt with Laurel.       Deandra Anderson RN  07/03/25, 12:24 EDT

## 2025-07-07 ENCOUNTER — TRANSCRIBE ORDERS (OUTPATIENT)
Facility: HOSPITAL | Age: 50
End: 2025-07-07
Payer: COMMERCIAL

## 2025-07-07 ENCOUNTER — TELEPHONE (OUTPATIENT)
Dept: INTERNAL MEDICINE | Facility: CLINIC | Age: 50
End: 2025-07-07
Payer: COMMERCIAL

## 2025-07-07 ENCOUNTER — HOSPITAL ENCOUNTER (OUTPATIENT)
Facility: HOSPITAL | Age: 50
Discharge: HOME | End: 2025-07-07
Payer: COMMERCIAL

## 2025-07-07 DIAGNOSIS — R41.3 MEMORY LOSS: ICD-10-CM

## 2025-07-07 DIAGNOSIS — M16.11 UNILATERAL PRIMARY OSTEOARTHRITIS, RIGHT HIP: ICD-10-CM

## 2025-07-07 DIAGNOSIS — M16.11 UNILATERAL PRIMARY OSTEOARTHRITIS, RIGHT HIP: Primary | ICD-10-CM

## 2025-07-07 PROCEDURE — 73502 X-RAY EXAM HIP UNI 2-3 VIEWS: CPT

## 2025-07-07 PROCEDURE — 70551 MRI BRAIN STEM W/O DYE: CPT

## 2025-07-07 NOTE — TELEPHONE ENCOUNTER
Called patient this morning to f/u with her about recent nurse visit on 07/03/2025.  Confirmed that I had spoke with Dr. Ocasio to discuss patient's concerns.  Patient has MRI scheduled to be completed this afternoon in [place]; she confirmed she would be keeping that appt as Dr. Ocasio wants her MRI to be completed as soon as possible.  Patient also confirmed she used a BP machine with arm cuff on the weekend and got the following results:    7/05/2025 = 107/79 ; taken at 6:20pm               7/06/2025 = 119/73; taken at 9:20pm    7/07/2025 = 145/82; taken at work with WRIST cuff)    Patient confirmed she is taking her Lisinopril in the morning now.  Reminder to discuss all with Laurel at her appointment in office tomorrow.  Patient verbalized understanding.  Offered further assistance as needed.    Deandra Anderson RN BSN  [facility]

## 2025-07-08 ENCOUNTER — OFFICE VISIT (OUTPATIENT)
Dept: INTERNAL MEDICINE | Facility: CLINIC | Age: 50
End: 2025-07-08
Payer: COMMERCIAL

## 2025-07-08 ENCOUNTER — LAB (OUTPATIENT)
Facility: HOSPITAL | Age: 50
End: 2025-07-08
Payer: COMMERCIAL

## 2025-07-08 VITALS
TEMPERATURE: 98.9 F | WEIGHT: 129.4 LBS | RESPIRATION RATE: 16 BRPM | BODY MASS INDEX: 24.43 KG/M2 | OXYGEN SATURATION: 99 % | HEART RATE: 80 BPM | HEIGHT: 61 IN | DIASTOLIC BLOOD PRESSURE: 90 MMHG | SYSTOLIC BLOOD PRESSURE: 136 MMHG

## 2025-07-08 DIAGNOSIS — R41.3 MEMORY LOSS: ICD-10-CM

## 2025-07-08 DIAGNOSIS — I10 PRIMARY HYPERTENSION: Primary | ICD-10-CM

## 2025-07-08 LAB — RPR SER QL: NORMAL

## 2025-07-08 PROCEDURE — 83520 IMMUNOASSAY QUANT NOS NONAB: CPT

## 2025-07-08 PROCEDURE — 36415 COLL VENOUS BLD VENIPUNCTURE: CPT

## 2025-07-08 PROCEDURE — 86592 SYPHILIS TEST NON-TREP QUAL: CPT

## 2025-07-08 PROCEDURE — 99214 OFFICE O/P EST MOD 30 MIN: CPT | Performed by: PHYSICIAN ASSISTANT

## 2025-07-08 PROCEDURE — 83921 ORGANIC ACID SINGLE QUANT: CPT

## 2025-07-08 RX ORDER — GABAPENTIN 300 MG/1
300 CAPSULE ORAL 3 TIMES DAILY
COMMUNITY
Start: 2025-07-01

## 2025-07-08 RX ORDER — METHYLPREDNISOLONE 4 MG/1
4 TABLET ORAL ONCE
COMMUNITY
Start: 2025-06-30

## 2025-07-08 RX ORDER — SEMAGLUTIDE 2.4 MG/.75ML
0.2 INJECTION, SOLUTION SUBCUTANEOUS
COMMUNITY

## 2025-07-08 NOTE — PROGRESS NOTES
"Chief Complaint  Hypertension (Blood pressure usually elevated when going to work. Works at [employer] on [place]. Does get headache with elevation, yesterday was 7/7/25 145/82, Sunday 7/6/25 119/73, Saturday 7/5/25 107/79, Friday 7/4/25 102/76, Thursday 7/3/25 135/75. All taken with electric blood pressure cuffs. The highest was with a wrist cuff others with left arm. )    Subjective          Keerthi Pillai presents to [facility]    Elevated blood pressure- symptoms have been worsening since patient has had to return to work in person approximately 1 month.  She has felt more stress when in the building and with her children graduating high school a couple months ago.  She typically notices headaches and tightness in her chest.  The next day she notices chest and back soreness.  She was evaluated in the ED with Maikol Mandel MD (06/24/2025) for her hypertension and her lisinopril was increased from 20mg to 40mg daily. She started taking the Lisinopril 20mg BID until last week then started taking 40mg in the morning. It has ranged from 102-145/73-82 over the past few days.  She has still been taking her methlyphenidate but is only taking half for a total of 13.5mg daily.  She is having a hard time focusing and staying on track and notices that this is worse when she is off the medication.  She is scheduled to establish with psychiatry and counseling within the next several months.    Not currently taking medrol dose kiel prescribed by Ortho.      Objective   Vital Signs:   /90 (BP Location: Left arm, Patient Position: Sitting, Cuff Size: Small Adult)   Pulse 80   Temp 98.9 °F (37.2 °C) (Temporal)   Resp 16   Ht 154.9 cm (61\")   Wt 58.7 kg (129 lb 6.4 oz)   SpO2 99%   BMI 24.45 kg/m²     Physical Exam  Vitals reviewed.   Constitutional:       Appearance: Normal appearance. She is well-developed.   HENT:      Head: Normocephalic and atraumatic.   Eyes:    "   Conjunctiva/sclera: Conjunctivae normal.      Pupils: Pupils are equal, round, and reactive to light.   Cardiovascular:      Rate and Rhythm: Normal rate and regular rhythm.      Heart sounds: No murmur heard.     No friction rub. No gallop.   Pulmonary:      Effort: Pulmonary effort is normal.      Breath sounds: Normal breath sounds. No wheezing or rhonchi.   Skin:     General: Skin is warm and dry.   Neurological:      Mental Status: She is alert and oriented to person, place, and time.      Cranial Nerves: No cranial nerve deficit.   Psychiatric:         Mood and Affect: Mood and affect normal.         Behavior: Behavior normal.         Thought Content: Thought content normal.         Judgment: Judgment normal.        Result Review :          Procedures      Assessment and Plan    Diagnoses and all orders for this visit:    1. Primary hypertension (Primary)  Assessment & Plan:  High end of normal in office but has been running well at home. Continue Lisinopril 40mg daily and monitor BP daily.  Will discuss with PCP treatment options for ADHD as methylphenidate could certainly be contributing to elevation in blood pressure.  Discussed stress eliminating techniques such as delegating responsibilities at home, spending more time doing things you enjoy and surrounding yourself with positive and encouraging influences/groups.  If blood pressure is 140/90 please contact our office.  Follow-up with PCP as scheduled next month, sooner for any questions or concerns.              I spent 40 minutes caring for Keerthi on this date of service. This time includes time spent by me in the following activities:preparing for the visit, reviewing tests, obtaining and/or reviewing a separately obtained history, performing a medically appropriate examination and/or evaluation , counseling and educating the patient/family/caregiver, and documenting information in the medical record  Follow Up   No follow-ups on file.  Patient  was given instructions and counseling regarding her condition or for health maintenance advice. Please see specific information pulled into the AVS if appropriate.

## 2025-07-08 NOTE — ASSESSMENT & PLAN NOTE
High end of normal in office but has been running well at home. Continue Lisinopril 40mg daily and monitor BP daily.  Will discuss with PCP treatment options for ADHD as methylphenidate could certainly be contributing to elevation in blood pressure.  Discussed stress eliminating techniques such as delegating responsibilities at home, spending more time doing things you enjoy and surrounding yourself with positive and encouraging influences/groups.  If blood pressure is 140/90 please contact our office.  Follow-up with PCP as scheduled next month, sooner for any questions or concerns.

## 2025-07-10 ENCOUNTER — PATIENT MESSAGE (OUTPATIENT)
Dept: INTERNAL MEDICINE | Facility: CLINIC | Age: 50
End: 2025-07-10
Payer: COMMERCIAL

## 2025-07-10 PROCEDURE — 87252 VIRUS INOCULATION TISSUE: CPT | Performed by: EMERGENCY MEDICINE

## 2025-07-10 PROCEDURE — 87254 VIRUS INOCULATION SHELL VIA: CPT | Performed by: EMERGENCY MEDICINE

## 2025-07-11 LAB
A -- BETA-AMYLOID 42/40 RATIO: 0.1
AL BETA40 PLAS-MCNC: 151.61 PG/ML
AL BETA42 PLAS-MCNC: 15.23 PG/ML
ATN SUMMARY1: ABNORMAL
INFORMATION:: ABNORMAL
NFL CHAIN SERPL IA-MCNC: 1.42 PG/ML (ref 0–1.69)
P-TAU181 PLAS IA-MCNC: 0.39 PG/ML (ref 0–0.95)

## 2025-07-12 LAB — METHYLMALONATE SERPL-SCNC: 91 NMOL/L (ref 0–378)

## 2025-07-16 ENCOUNTER — OFFICE VISIT (OUTPATIENT)
Dept: GASTROENTEROLOGY | Facility: CLINIC | Age: 50
End: 2025-07-16
Payer: COMMERCIAL

## 2025-07-16 VITALS
DIASTOLIC BLOOD PRESSURE: 53 MMHG | HEIGHT: 61 IN | SYSTOLIC BLOOD PRESSURE: 116 MMHG | WEIGHT: 132 LBS | BODY MASS INDEX: 24.92 KG/M2 | HEART RATE: 85 BPM

## 2025-07-16 DIAGNOSIS — D50.9 IRON DEFICIENCY ANEMIA, UNSPECIFIED IRON DEFICIENCY ANEMIA TYPE: Primary | ICD-10-CM

## 2025-07-16 DIAGNOSIS — Z83.719 FH: COLON POLYPS: ICD-10-CM

## 2025-07-16 DIAGNOSIS — Z14.8 HEMOCHROMATOSIS CARRIER: ICD-10-CM

## 2025-07-16 DIAGNOSIS — Z86.0101 HISTORY OF ADENOMATOUS POLYP OF COLON: ICD-10-CM

## 2025-07-16 DIAGNOSIS — Z85.3 HISTORY OF BREAST CANCER: ICD-10-CM

## 2025-07-16 NOTE — PROGRESS NOTES
Chief Complaint        Hemochromatosis and Anemia    History of Present Illness      Keerthi Pillai is a 50 y.o. female who presents to [facility] as a new patient       History of Present Illness  The patient is a 50-year-old female who presents today as a new patient to our office for hemochromatosis and anemia.    She reports low iron levels due to hemochromatosis. Her ferritin levels have been significantly elevated in the past, but she managed to reduce them once. She has not undergone an upper endoscopy. She does not experience difficulty swallowing, heartburn, belching, burping, or stomach upset. She also does not have abdominal pain. Her bowel movements are regular, occurring every other day, which she considers normal. She reports no presence of blood or dark color in her stools. She is not under the care of a cardiologist or pulmonologist and is not on any anticoagulant therapy.    She was diagnosed with breast cancer in 11/2015. Her family history includes her father having colon polyps and diverticulitis, and her paternal grandmother having watermelon stomach (GAVE).    PAST SURGICAL HISTORY:  Colonoscopy in 06/2018    FAMILY HISTORY  - Father: Colon polyps, diverticulitis  - Paternal grandmother: GAVE (watermelon stomach)    Negative for stomach cancer, throat cancer, liver cancer, pancreatic cancer      Results       Result Review :   The following data was reviewed by: ANATOLIY Alexis on 07/16/2025     CMP          3/25/2025    09:20 6/9/2025    13:04 6/24/2025    17:06   CMP   Glucose 82  91  105    BUN 15  10.0  12.1    Creatinine 0.58  0.70  0.62    EGFR 111.1  106.2  109.3    Sodium 139  141  139    Potassium 3.7  4.0  3.6    Chloride 105  106  104    Calcium 8.5  8.6  8.7    Total Protein 6.4  6.5  7.0    Albumin 3.6  3.8  4.1    Globulin 2.8  2.7  2.9    Total Bilirubin 0.5  0.2  0.3    Alkaline Phosphatase 55  65  67    AST (SGOT) 25  21  22     ALT (SGPT) 18  13  17    Albumin/Globulin Ratio 1.3  1.4  1.4    BUN/Creatinine Ratio 25.9  14.3  19.5    Anion Gap 9.7  8.0  10.9      CBC          3/25/2025    09:20 6/9/2025    13:04 6/24/2025    17:06   CBC   WBC 5.53  5.15  6.54    RBC 3.53  3.62  3.63    Hemoglobin 11.9  12.3  12.1    Hematocrit 35.0  36.4  35.8    MCV 99.2  100.6  98.6    MCH 33.7  34.0  33.3    MCHC 34.0  33.8  33.8    RDW 11.8  11.5  11.6    Platelets 233  226  267      CBC w/diff          3/25/2025    09:20 6/9/2025    13:04 6/24/2025    17:06   CBC w/Diff   WBC 5.53  5.15  6.54    RBC 3.53  3.62  3.63    Hemoglobin 11.9  12.3  12.1    Hematocrit 35.0  36.4  35.8    MCV 99.2  100.6  98.6    MCH 33.7  34.0  33.3    MCHC 34.0  33.8  33.8    RDW 11.8  11.5  11.6    Platelets 233  226  267    Neutrophil Rel % 65.4  66.5  66.6    Immature Granulocyte Rel % 0.4  0.2  0.3    Lymphocyte Rel % 22.4  23.3  23.9    Monocyte Rel % 9.8  7.0  6.7    Eosinophil Rel % 1.1  1.6  1.1    Basophil Rel % 0.9  1.4  1.4      Lipid Panel          9/18/2024    16:14   Lipid Panel   Total Cholesterol 158    Triglycerides 106    HDL Cholesterol 36    VLDL Cholesterol 20    LDL Cholesterol  102    LDL/HDL Ratio 2.80      TSH          9/18/2024    16:14 6/9/2025    13:04   TSH   TSH 0.522  0.724      Electrolytes          3/25/2025    09:20 6/9/2025    13:04 6/24/2025    17:06   Electrolytes   Sodium 139  141  139    Potassium 3.7  4.0  3.6    Chloride 105  106  104    Calcium 8.5  8.6  8.7      Renal Profile          3/25/2025    09:20 6/9/2025    13:04 6/24/2025    17:06   Renal Profile   BUN 15  10.0  12.1    Creatinine 0.58  0.70  0.62      BMP          3/25/2025    09:20 6/9/2025    13:04 6/24/2025    17:06   BMP   BUN 15  10.0  12.1    Creatinine 0.58  0.70  0.62    Sodium 139  141  139    Potassium 3.7  4.0  3.6    Chloride 105  106  104    CO2 24.3  27.0  24.1    Calcium 8.5  8.6  8.7            Lipase   Lipase   Date Value Ref Range Status   06/24/2025 32  13 - 60 U/L Final     Amylase   Amylase   Date Value Ref Range Status   04/16/2021 33 30 - 110 U/L Final     Iron Profile   Iron   Date Value Ref Range Status   02/20/2024 210 (H) 37 - 145 mcg/dL Final     TIBC   Date Value Ref Range Status   02/20/2024 253 (L) 298 - 536 mcg/dL Final     Iron Saturation (TSAT)   Date Value Ref Range Status   02/20/2024 83 (H) 20 - 50 % Final     Transferrin   Date Value Ref Range Status   02/20/2024 170 (L) 200 - 360 mg/dL Final     Ferritin   Ferritin   Date Value Ref Range Status   03/25/2025 552.00 (H) 13.00 - 150.00 ng/mL Final   12/09/2021 289.8 (H) 5 - 204 ng/mL Final     ESR (Sed Rate)   Sed Rate   Date Value Ref Range Status   09/18/2024 4 0 - 20 mm/hr Final     CRP (C-Reactive)   C-Reactive Protein   Date Value Ref Range Status   09/18/2024 <0.30 0.00 - 0.50 mg/dL Final     Liver Workup   Ferritin   Date Value Ref Range Status   03/25/2025 552.00 (H) 13.00 - 150.00 ng/mL Final   12/09/2021 289.8 (H) 5 - 204 ng/mL Final     Iron   Date Value Ref Range Status   02/20/2024 210 (H) 37 - 145 mcg/dL Final     TIBC   Date Value Ref Range Status   02/20/2024 253 (L) 298 - 536 mcg/dL Final     Iron Saturation (TSAT)   Date Value Ref Range Status   02/20/2024 83 (H) 20 - 50 % Final     Transferrin   Date Value Ref Range Status   02/20/2024 170 (L) 200 - 360 mg/dL Final     Protime   Date Value Ref Range Status   10/15/2019 9.7 9.4 - 12.0 s Final     INR   Date Value Ref Range Status   10/15/2019 0.88 (L) 2.00 - 3.00 NA Final     Comment:     Suggested Therapeutic Ranges For Oral Anticoagulant Therapy:  Level of Therapy                      INR Target Range  Standard Dose                            2.0-3.0  High Dose                                2.5-3.5  Patients not receiving anticoagulant  Therapy Normal Range                     0.6-1.2       Acute HEP Panel   Hepatitis C Ab   Date Value Ref Range Status   11/14/2022 Non-Reactive Non-Reactive Final     Alpha 1 No results found  "for: \"AFPTM\"         Results  Labs   - Hemoglobin: 2025, 11.9 g/dL   - Ferritin: 2025, High    Imaging   - Colonoscopy: 2018, Showed some colon polyps, nonbleeding internal hemorrhoids, otherwise normal recall colonoscopy. Pathology positive for tubular adenoma.      Past Medical History       Past Medical History:   Diagnosis Date    Acne     ADHD (attention deficit hyperactivity disorder)     Anemia     Anxiety     Arthritis     Benign essential hypertension     Breast cancer     Cancer 2015    Right Breast    Depression     Forgetfulness     Headache, tension-type     Hemochromatosis 2018    Lumbago 2020    low back pain    Migraine 1998    Moderate episode of recurrent major depressive disorder 12/10/2017    will adjust lexapro and wellbutrin and see how she does    Night sweats     Postmenopausal 2020    Primary osteoarthritis of right knee 2018    Shingles 2012    above right eye    Sinus trouble     unspecified    Sleep apnea     Spondylolisthesis, lumbar region 2020       Past Surgical History:   Procedure Laterality Date    BREAST RECONSTRUCTION      with implants     SECTION      ,     COLONOSCOPY      FAT GRAFTING Bilateral 2019    breasts     FRACTURE SURGERY  19    Collar bone    JOINT REPLACEMENT  23    Rt knee partial placement    KNEE MENISCAL REPAIR  2012    LYMPH NODE BIOPSY      MASTECTOMY Bilateral     OTHER SURGICAL HISTORY      joint surgery    OTHER SURGICAL HISTORY      port placement         Current Outpatient Medications:     ARIPiprazole (Abilify) 2 MG tablet, Take 1 tablet by mouth Daily., Disp: 90 tablet, Rfl: 1    azelastine (ASTELIN) 0.1 % nasal spray, 2 sprays into the nostril(s) as directed by provider 2 (Two) Times a Day. Use in each nostril as directed, Disp: 30 mL, Rfl: 2    celecoxib (CeleBREX) 200 MG capsule, Needs Prior Authorization Take 1 capsule by mouth Daily., Disp: 90 capsule, Rfl: 3    " desvenlafaxine (PRISTIQ) 100 MG 24 hr tablet, Take 1 tablet by mouth Daily., Disp: 90 tablet, Rfl: 1    estradiol-norethindrone (ACTIVELLA) 1-0.5 MG per tablet, TAKE ONE TABLET BY MOUTH EVERY DAY, Disp: 84 tablet, Rfl: 3    fexofenadine (Allergy Relief) 180 MG tablet, Take 1 tablet by mouth Daily., Disp: 90 tablet, Rfl: 1    fluticasone (FLONASE) 50 MCG/ACT nasal spray, 2 sprays into the nostril(s) as directed by provider Daily., Disp: 11.1 mL, Rfl: 1    gabapentin (NEURONTIN) 300 MG capsule, Take 1 capsule by mouth 3 (Three) Times a Day., Disp: , Rfl:     lisinopril (PRINIVIL,ZESTRIL) 40 MG tablet, Take 1 tablet by mouth Daily., Disp: 30 tablet, Rfl: 0    methylphenidate 27 MG CR tablet, TAKE ONE TABLET BY MOUTH EVERY MORNING (Patient taking differently: Take 13.5 mg by mouth Every Morning), Disp: 30 tablet, Rfl: 0    methylPREDNISolone (MEDROL) 4 MG dose pack, Take 1 tablet by mouth 1 (One) Time., Disp: , Rfl:     mupirocin (BACTROBAN) 2 % ointment, , Disp: , Rfl:     Semaglutide-Weight Management (Wegovy) 2.4 MG/0.75ML solution auto-injector, Inject 0.2 mL under the skin into the appropriate area as directed Every 10 (Ten) Days., Disp: , Rfl:     tiZANidine (ZANAFLEX) 2 MG tablet, Take 1 tablet by mouth At Night As Needed for Muscle Spasms., Disp: 90 tablet, Rfl: 1    traZODone (DESYREL) 50 MG tablet, Take ONE tablet by mouth every NIGHT at bedtime, Disp: 90 tablet, Rfl: 1    triamcinolone (KENALOG) 0.1 % cream, Apply 1 Application topically to the appropriate area as directed 2 (Two) Times a Day. Use for no longer than 1 week, Disp: 60 g, Rfl: 0    valACYclovir (VALTREX) 1000 MG tablet, Take 1 tablet by mouth 3 (Three) Times a Day for 7 days., Disp: 21 tablet, Rfl: 0     Allergies   Allergen Reactions    Augmentin [Amoxicillin-Pot Clavulanate] Hives    Nickel Itching    Titanium Itching       Family History   Problem Relation Age of Onset    Arthritis Mother     Hyperlipidemia Mother     Hypertension Mother      "Arthritis Father     Depression Father     Hyperlipidemia Father     Hypertension Father     Stroke Other     Heart disease Other     Brain cancer Other     Dementia Paternal Grandmother     Neuropathy Paternal Grandmother         Social History     Social History Narrative    Not on file       Objective       Objective     Vital Signs:   /53 (BP Location: Right arm, Patient Position: Sitting, Cuff Size: Adult)   Pulse 85   Ht 154.9 cm (61\")   Wt 59.9 kg (132 lb)   BMI 24.94 kg/m²     Body mass index is 24.94 kg/m².    Physical Exam  Constitutional:       General: She is not in acute distress.     Appearance: She is well-developed. She is not ill-appearing.   HENT:      Head: Normocephalic.   Eyes:      Pupils: Pupils are equal, round, and reactive to light.   Cardiovascular:      Rate and Rhythm: Normal rate and regular rhythm.      Heart sounds: Normal heart sounds.   Pulmonary:      Effort: Pulmonary effort is normal.      Breath sounds: Normal breath sounds.   Abdominal:      General: Bowel sounds are normal. There is no distension.      Palpations: Abdomen is soft. There is no mass.      Tenderness: There is no abdominal tenderness. There is no guarding or rebound.      Hernia: No hernia is present.   Musculoskeletal:         General: Normal range of motion.   Skin:     General: Skin is warm and dry.   Neurological:      Mental Status: She is alert and oriented to person, place, and time.   Psychiatric:         Speech: Speech normal.         Behavior: Behavior normal.         Judgment: Judgment normal.         Physical Exam             Assessment & Plan          Assessment and Plan    Diagnoses and all orders for this visit:    1. Iron deficiency anemia, unspecified iron deficiency anemia type (Primary)  -     Case Request; Standing  -     Follow Anesthesia Guidelines / Protocol; Future  -     Case Request    2. Hemochromatosis carrier  -     Case Request; Standing  -     Follow Anesthesia Guidelines " / Protocol; Future  -     Case Request    3. History of adenomatous polyp of colon  -     Case Request; Standing  -     Follow Anesthesia Guidelines / Protocol; Future  -     Case Request    4. FH: colon polyps  -     Case Request; Standing  -     Follow Anesthesia Guidelines / Protocol; Future  -     Case Request    5. History of breast cancer  -     Case Request; Standing  -     Follow Anesthesia Guidelines / Protocol; Future  -     Case Request    Other orders  -     Follow Anesthesia Guidelines / Protocol; Standing  -     Verify NPO; Standing  -     Verify Bowel Prep Was Successful; Standing  -     Give Tap Water Enema If Bowel Prep Insufficient; Standing  -     Obtain Informed Consent; Standing    Surgical Risk and Benefits discussed: Possible risks/complications, benefits, and alternatives to surgical or invasive procedure have been explained to patient and/or legal guardian; risks include bleeding, infection, and perforation. Patient has been evaluated and can tolerate anesthesia and/or sedation. Risks, benefits, and alternatives to anesthesia and sedation have been explained to patient and/or legal guardian.    Assessment & Plan  1. Hemochromatosis:  - Elevated ferritin levels noted.  - No reported dysphagia, heartburn, belching, burping, or gastrointestinal discomfort.  - Regular bowel movements every other day without hematochezia or melena.  - Schedule upper and lower endoscopy to evaluate the entire gastrointestinal tract for potential bleeding sources.      2. Anemia:  - Hemoglobin decreased from 13 to 11.9.  - Perform upper and lower endoscopy to identify any gastrointestinal sources of bleeding.  - If no gastrointestinal sources are identified, further workup required to determine other causes of anemia.    3. Breast cancer:  - Diagnosed in 11/2015.  - No current issues reported.    4. FH GAVE (paternal grandmother)               Follow Up       Follow Up   Return for F/U AFTER PROCEDURE.  Patient was  given instructions and counseling regarding her condition or for health maintenance advice. Please see specific information pulled into the AVS if appropriate.       Patient or patient representative verbalized consent for the use of Ambient Listening during the visit with  ANATOLIY Alexis for chart documentation. 7/16/2025  08:55 EDT

## 2025-07-18 ENCOUNTER — PATIENT ROUNDING (BHMG ONLY) (OUTPATIENT)
Dept: GASTROENTEROLOGY | Facility: CLINIC | Age: 50
End: 2025-07-18
Payer: COMMERCIAL

## 2025-07-18 ENCOUNTER — OFFICE VISIT (OUTPATIENT)
Dept: INTERNAL MEDICINE | Facility: CLINIC | Age: 50
End: 2025-07-18
Payer: COMMERCIAL

## 2025-07-18 ENCOUNTER — TELEPHONE (OUTPATIENT)
Dept: INTERNAL MEDICINE | Facility: CLINIC | Age: 50
End: 2025-07-18

## 2025-07-18 VITALS
HEIGHT: 61 IN | BODY MASS INDEX: 24.47 KG/M2 | TEMPERATURE: 98.2 F | WEIGHT: 129.6 LBS | SYSTOLIC BLOOD PRESSURE: 130 MMHG | OXYGEN SATURATION: 100 % | HEART RATE: 77 BPM | RESPIRATION RATE: 14 BRPM | DIASTOLIC BLOOD PRESSURE: 88 MMHG

## 2025-07-18 DIAGNOSIS — C50.211 MALIGNANT NEOPLASM OF UPPER-INNER QUADRANT OF RIGHT BREAST IN FEMALE, ESTROGEN RECEPTOR NEGATIVE: ICD-10-CM

## 2025-07-18 DIAGNOSIS — Z17.1 MALIGNANT NEOPLASM OF UPPER-INNER QUADRANT OF RIGHT BREAST IN FEMALE, ESTROGEN RECEPTOR NEGATIVE: ICD-10-CM

## 2025-07-18 DIAGNOSIS — R21 RASH: ICD-10-CM

## 2025-07-18 DIAGNOSIS — D50.9 IRON DEFICIENCY ANEMIA, UNSPECIFIED IRON DEFICIENCY ANEMIA TYPE: Primary | ICD-10-CM

## 2025-07-18 DIAGNOSIS — F31.9 BIPOLAR 1 DISORDER: ICD-10-CM

## 2025-07-18 DIAGNOSIS — F33.0 MAJOR DEPRESSIVE DISORDER, RECURRENT, MILD: ICD-10-CM

## 2025-07-18 PROCEDURE — 99214 OFFICE O/P EST MOD 30 MIN: CPT | Performed by: INTERNAL MEDICINE

## 2025-07-18 RX ORDER — VALACYCLOVIR HYDROCHLORIDE 1 G/1
1000 TABLET, FILM COATED ORAL 3 TIMES DAILY
Qty: 21 TABLET | Refills: 0 | Status: SHIPPED | OUTPATIENT
Start: 2025-07-18 | End: 2025-07-25

## 2025-07-18 RX ORDER — ARIPIPRAZOLE 2 MG/1
4 TABLET ORAL DAILY
Qty: 180 TABLET | Refills: 1 | Status: SHIPPED | OUTPATIENT
Start: 2025-07-18

## 2025-07-18 NOTE — PROGRESS NOTES
A My-Chart message has been sent to the patient for PATIENT ROUNDING with [facility] [place].

## 2025-07-18 NOTE — TELEPHONE ENCOUNTER
OK FOR HUB TO RELAY:    Lvm to see if patient can come in at 10:00 instead of 1:00. Ok for patient to still come in at 1:00 if not.     If patient can come in early please leave at 1:00 slot and add to appt notes that they are coming in early

## 2025-07-18 NOTE — TELEPHONE ENCOUNTER
"  Name: Keerthi Pillai \"Amber\"    Relationship: Self    Best Callback Number: [phone]     HUB PROVIDED THE RELAY MESSAGE FROM THE OFFICE   PATIENT VOICED UNDERSTANDING AND HAS NO FURTHER QUESTIONS AT THIS TIME    ADDITIONAL INFORMATION: PATIENT COMING IN AT 10 AM  "

## 2025-07-18 NOTE — PROGRESS NOTES
Chief Complaint  Memory changes, Hypertension, Results (Results from Neurology. Discuss lab results that were just completed. ), and Insomnia (Either doesn't sleep, or sleeps 13 hours. Stays fatigued. )      Subjective      History of Present Illness  The patient presents for evaluation of suspected Alzheimer's disease, bipolar disorder, shingles, and anemia.    She recently consulted a neurologist who suggested her condition is common in their family. A recent blood test indicated possible Alzheimer's disease. A spinal tap has been recommended. She has not undergone a hysterectomy and has been in contact with chickens, including cleaning their coop last fall. She reports no involvement in animal processing or handling brain tissue. She experiences visual disturbances, such as seeing objects in her peripheral vision that are not present, but does not report hallucinations.    She is curious if her elevated blood pressure could be due to a manic episode of bipolar disorder. She discovered a family history of bipolar disorder and has symptoms such as insomnia, impulsive behavior (ordering chickens at 2:00 AM), and fatigue. She has been on Abilify for 3 months, which has helped manage her anger. She also takes Pristiq, which she finds beneficial. She is currently on the waitlist for two therapists who provide EMDR therapy.    She continues to experience itching localized to the right side of her face, neck, hip, and hand, where she previously had shingles. Symptoms began on 07/10/2025. She was prescribed medication for 7 days, which improved her condition, but the itching returned after discontinuing the medication.    She has started taking sublingual B12 supplements and Slow Fe for energy. She is scheduled for a colonoscopy.    Sleep: Reports insomnia and reduced sleep, with an episode of sleeping only 3 hours on 07/13/2025.           Objective   Vital Signs:   Vitals:    07/18/25 1007   BP: 130/88   BP Location:  "Left arm   Patient Position: Sitting   Cuff Size: Small Adult   Pulse: 77   Resp: 14   Temp: 98.2 °F (36.8 °C)   TempSrc: Temporal   SpO2: 100%   Weight: 58.8 kg (129 lb 9.6 oz)   Height: 154.9 cm (61\")     Body mass index is 24.49 kg/m².    Wt Readings from Last 3 Encounters:   07/18/25 58.8 kg (129 lb 9.6 oz)   07/16/25 59.9 kg (132 lb)   07/10/25 59.4 kg (131 lb)     BP Readings from Last 3 Encounters:   07/18/25 130/88   07/16/25 116/53   07/10/25 127/76       Health Maintenance   Topic Date Due    ANNUAL PHYSICAL  Never done    PAP SMEAR  05/03/2024    COVID-19 Vaccine (4 - 2024-25 season) 09/01/2024    INFLUENZA VACCINE  10/01/2025    COLORECTAL CANCER SCREENING  06/07/2028    TDAP/TD VACCINES (2 - Td or Tdap) 01/27/2033    HEPATITIS C SCREENING  Completed    Pneumococcal Vaccine 0-49  Aged Out    MAMMOGRAM  Discontinued       Physical Exam  Vitals reviewed.   Constitutional:       Appearance: Normal appearance. She is well-developed.   HENT:      Head: Normocephalic and atraumatic.      Right Ear: External ear normal.      Left Ear: External ear normal.   Eyes:      Conjunctiva/sclera: Conjunctivae normal.      Pupils: Pupils are equal, round, and reactive to light.   Cardiovascular:      Rate and Rhythm: Normal rate and regular rhythm.      Heart sounds: No murmur heard.     No friction rub. No gallop.   Pulmonary:      Effort: Pulmonary effort is normal.      Breath sounds: Normal breath sounds. No wheezing or rhonchi.   Skin:     General: Skin is warm and dry.   Neurological:      Mental Status: She is alert and oriented to person, place, and time.   Psychiatric:         Mood and Affect: Affect normal.         Behavior: Behavior normal.         Thought Content: Thought content normal.        Physical Exam  Skin: Rash noted across the right face, right neck, and right hip.      Result Review :  The following data was reviewed by: Jennifer Ocasio MD on 07/18/2025:         Results  Reviewed recent " labs    BMI is within normal parameters. No other follow-up for BMI required.       Procedures            Assessment & Plan  Rash    Orders:    valACYclovir (VALTREX) 1000 MG tablet; Take 1 tablet by mouth 3 (Three) Times a Day for 7 days.    Iron deficiency anemia, unspecified iron deficiency anemia type         Major depressive disorder, recurrent, mild           Bipolar 1 disorder                Assessment & Plan  Suspected Alzheimer's Disease  - TMT test results suggest possible Alzheimer's disease  - Rapid progression of symptoms raises concerns about autoimmune encephalitis or other fast progressing neurologic illness such as Lewy body dementia or Creutzfeldt Emiliano disease.  - Recommended spinal tap for further investigation and reassured patient about the procedure    Bipolar Disorder  - Blood pressure readings are normal today  - Potential bipolar disorder diagnosis and need to manage blood pressure and rare autoimmune diseases may require reducing or discontinuing methylphenidate temporarily  - Advised to consider taking a break from work during evaluations  - Increase Abilify to 5 mg and monitor for serotonin syndrome  - Advised to discontinue methylphenidate temporarily to assess impact on blood pressure    Shingles  - Symptoms suggest shingles despite negative scraping test  - Prescribed another round of antiviral medication    Anemia  - Anemia appears resolved, though levels remain slightly low  - Advised to continue multivitamin with iron and repeat labs in a month to assess iron levels    Follow-up  - Follow up on 08/15/2025    Patient or patient representative verbalized consent for the use of Ambient Listening during the visit with  Jennifer Ocasio MD for chart documentation. 7/21/2025  15:58 EDT      FOLLOW UP  No follow-ups on file.  Patient was given instructions and counseling regarding her condition or for health maintenance advice. Please see specific information pulled into the AVS if  appropriate.     Jennifer Ocasio MD  07/18/25  10:42 EDT    CURRENT & DISCONTINUED MEDICATIONS  Current Outpatient Medications   Medication Instructions    ARIPiprazole (ABILIFY) 2 mg, Oral, Daily    azelastine (ASTELIN) 0.1 % nasal spray 2 sprays, Nasal, 2 Times Daily, Use in each nostril as directed    celecoxib (CeleBREX) 200 MG capsule Needs Prior Authorization Take 1 capsule by mouth Daily.    desvenlafaxine (PRISTIQ) 100 mg, Oral, Daily    estradiol-norethindrone (ACTIVELLA) 1-0.5 MG per tablet 1 tablet, Oral, Daily    fexofenadine (ALLERGY RELIEF) 180 mg, Oral, Daily    fluticasone (FLONASE) 50 MCG/ACT nasal spray 2 sprays, Nasal, Daily    gabapentin (NEURONTIN) 300 mg, 3 Times Daily    lisinopril (PRINIVIL,ZESTRIL) 40 mg, Oral, Daily    mupirocin (BACTROBAN) 2 % ointment     PEG-KCl-NaCl-NaSulf-Na Asc-C (PLENVU) 140 g reconstituted solution solution 140 g, Oral, Take As Directed    Semaglutide-Weight Management (Wegovy) 2.4 MG/0.75ML solution auto-injector 0.2 mL, Every 10 Days    tiZANidine (ZANAFLEX) 2 mg, Oral, Nightly PRN    traZODone (DESYREL) 50 mg, Oral, Every Night at Bedtime    triamcinolone (KENALOG) 0.1 % cream 1 Application, Topical, 2 Times Daily, Use for no longer than 1 week    valACYclovir (VALTREX) 1,000 mg, Oral, 3 Times Daily       Medications Discontinued During This Encounter   Medication Reason    methylPREDNISolone (MEDROL) 4 MG dose pack     valACYclovir (VALTREX) 1000 MG tablet     methylphenidate 27 MG CR tablet

## 2025-07-22 ENCOUNTER — TELEPHONE (OUTPATIENT)
Dept: INTERNAL MEDICINE | Facility: CLINIC | Age: 50
End: 2025-07-22
Payer: COMMERCIAL

## 2025-07-22 ENCOUNTER — TELEPHONE (OUTPATIENT)
Dept: NEUROLOGY | Facility: CLINIC | Age: 50
End: 2025-07-22
Payer: COMMERCIAL

## 2025-07-22 NOTE — TELEPHONE ENCOUNTER
Called patient and let her know that Dr. Prakash is referring her to Dr. Hoff and that they would be calling to get her scheduled for an appt. I have also called Clarisa Calderon and cancelled her LP that she had scheduled. Dr. Prakash said that she did not need to have that done, that Dr. Hoff could set her up for all the testing that she may need. She expressed understanding. Patient encouraged to contact our office with any further questions or concerns.

## 2025-07-22 NOTE — TELEPHONE ENCOUNTER
"  Caller: Keerthi Pillai \"Amber\"    Relationship: Self    Best call back number: [phone]    What is the best time to reach you: ANY    Who are you requesting to speak with (clinical staff, provider,  specific staff member): CLINICAL     What was the call regarding: DR. SCHILLING WANTS TO CANCELPATIENT'S SPINAL TAP AND HAVE THE PATIENT GO TO DR. DIAZ AS A NEUROCOGNITIVE SPECIALIST. PATIENT WOULD LIKE MD BUTCHER'S OPINION ON IF THE SPINAL TAP SHOULD BE CANCELED.     "

## 2025-07-22 NOTE — TELEPHONE ENCOUNTER
Yes it should be done with whomever feels the most comfortable doing it.  As long as Dr. Hoff can get her in quickly I totally agree with that plan.

## 2025-07-24 ENCOUNTER — HOSPITAL ENCOUNTER (OUTPATIENT)
Dept: INTERVENTIONAL RADIOLOGY/VASCULAR | Facility: HOSPITAL | Age: 50
Discharge: HOME | End: 2025-07-24
Payer: COMMERCIAL

## 2025-07-24 ENCOUNTER — TELEPHONE (OUTPATIENT)
Dept: NEUROLOGY | Facility: CLINIC | Age: 50
End: 2025-07-24
Payer: COMMERCIAL

## 2025-07-24 ENCOUNTER — TELEPHONE (OUTPATIENT)
Dept: NEUROLOGY | Facility: CLINIC | Age: 50
End: 2025-07-24

## 2025-08-01 RX ORDER — LISINOPRIL 40 MG/1
40 TABLET ORAL DAILY
Qty: 30 TABLET | Refills: 0 | Status: SHIPPED | OUTPATIENT
Start: 2025-08-01

## 2025-08-04 ENCOUNTER — TELEPHONE (OUTPATIENT)
Dept: GASTROENTEROLOGY | Facility: CLINIC | Age: 50
End: 2025-08-04
Payer: COMMERCIAL

## 2025-08-15 ENCOUNTER — TELEPHONE (OUTPATIENT)
Dept: INTERNAL MEDICINE | Facility: CLINIC | Age: 50
End: 2025-08-15

## 2025-08-19 DIAGNOSIS — R41.3 MEMORY LOSS: Primary | ICD-10-CM

## 2025-08-22 ENCOUNTER — OFFICE VISIT (OUTPATIENT)
Dept: INTERNAL MEDICINE | Facility: CLINIC | Age: 50
End: 2025-08-22
Payer: COMMERCIAL

## 2025-08-22 ENCOUNTER — PATIENT ROUNDING (BHMG ONLY) (OUTPATIENT)
Dept: NEUROLOGY | Facility: CLINIC | Age: 50
End: 2025-08-22
Payer: COMMERCIAL

## 2025-08-22 ENCOUNTER — TELEPHONE (OUTPATIENT)
Dept: SLEEP MEDICINE | Facility: HOSPITAL | Age: 50
End: 2025-08-22
Payer: COMMERCIAL

## 2025-08-22 VITALS
RESPIRATION RATE: 18 BRPM | BODY MASS INDEX: 24.73 KG/M2 | DIASTOLIC BLOOD PRESSURE: 72 MMHG | OXYGEN SATURATION: 100 % | HEART RATE: 77 BPM | TEMPERATURE: 98.3 F | WEIGHT: 131 LBS | SYSTOLIC BLOOD PRESSURE: 126 MMHG | HEIGHT: 61 IN

## 2025-08-22 DIAGNOSIS — I10 PRIMARY HYPERTENSION: Primary | ICD-10-CM

## 2025-08-22 DIAGNOSIS — Z14.8 HEMOCHROMATOSIS CARRIER: ICD-10-CM

## 2025-08-22 DIAGNOSIS — R41.840 CONCENTRATION DEFICIT: ICD-10-CM

## 2025-08-22 DIAGNOSIS — R41.3 MEMORY LOSS: ICD-10-CM

## 2025-08-22 DIAGNOSIS — F33.0 MAJOR DEPRESSIVE DISORDER, RECURRENT, MILD: ICD-10-CM

## 2025-08-22 PROCEDURE — 99214 OFFICE O/P EST MOD 30 MIN: CPT | Performed by: INTERNAL MEDICINE

## 2025-08-22 RX ORDER — METHYLPHENIDATE HYDROCHLORIDE 18 MG/1
18 TABLET ORAL EVERY MORNING
Qty: 30 TABLET | Refills: 0 | Status: SHIPPED | OUTPATIENT
Start: 2025-08-22

## 2025-08-22 RX ORDER — SEMAGLUTIDE 1.7 MG/.75ML
1.7 INJECTION, SOLUTION SUBCUTANEOUS WEEKLY
Qty: 9 ML | Refills: 0 | Status: SHIPPED | OUTPATIENT
Start: 2025-08-22

## 2025-08-27 ENCOUNTER — ANESTHESIA EVENT (OUTPATIENT)
Dept: GASTROENTEROLOGY | Facility: HOSPITAL | Age: 50
End: 2025-08-27
Payer: COMMERCIAL

## 2025-08-28 ENCOUNTER — ANESTHESIA (OUTPATIENT)
Dept: GASTROENTEROLOGY | Facility: HOSPITAL | Age: 50
End: 2025-08-28
Payer: COMMERCIAL

## 2025-08-28 ENCOUNTER — TELEPHONE (OUTPATIENT)
Dept: GASTROENTEROLOGY | Facility: CLINIC | Age: 50
End: 2025-08-28
Payer: COMMERCIAL

## 2025-08-28 ENCOUNTER — HOSPITAL ENCOUNTER (OUTPATIENT)
Facility: HOSPITAL | Age: 50
Setting detail: HOSPITAL OUTPATIENT SURGERY
Discharge: HOME | End: 2025-08-28
Attending: INTERNAL MEDICINE | Admitting: INTERNAL MEDICINE
Payer: COMMERCIAL

## 2025-08-28 VITALS
RESPIRATION RATE: 13 BRPM | WEIGHT: 131.39 LBS | HEART RATE: 65 BPM | SYSTOLIC BLOOD PRESSURE: 124 MMHG | HEIGHT: 61 IN | DIASTOLIC BLOOD PRESSURE: 81 MMHG | TEMPERATURE: 97.5 F | OXYGEN SATURATION: 100 % | BODY MASS INDEX: 24.81 KG/M2

## 2025-08-28 DIAGNOSIS — Z86.0101 HISTORY OF ADENOMATOUS POLYP OF COLON: ICD-10-CM

## 2025-08-28 DIAGNOSIS — Z83.719 FH: COLON POLYPS: ICD-10-CM

## 2025-08-28 DIAGNOSIS — D50.9 IRON DEFICIENCY ANEMIA, UNSPECIFIED IRON DEFICIENCY ANEMIA TYPE: ICD-10-CM

## 2025-08-28 DIAGNOSIS — Z85.3 HISTORY OF BREAST CANCER: ICD-10-CM

## 2025-08-28 DIAGNOSIS — Z14.8 HEMOCHROMATOSIS CARRIER: ICD-10-CM

## 2025-08-28 PROCEDURE — 25010000002 PROPOFOL 10 MG/ML EMULSION: Performed by: NURSE ANESTHETIST, CERTIFIED REGISTERED

## 2025-08-28 PROCEDURE — 25810000003 LACTATED RINGERS PER 1000 ML: Performed by: NURSE ANESTHETIST, CERTIFIED REGISTERED

## 2025-08-28 PROCEDURE — 25010000002 LIDOCAINE PF 2% 2 % SOLUTION: Performed by: NURSE ANESTHETIST, CERTIFIED REGISTERED

## 2025-08-28 PROCEDURE — 88305 TISSUE EXAM BY PATHOLOGIST: CPT | Performed by: INTERNAL MEDICINE

## 2025-08-28 RX ORDER — PROPOFOL 10 MG/ML
VIAL (ML) INTRAVENOUS AS NEEDED
Status: DISCONTINUED | OUTPATIENT
Start: 2025-08-28 | End: 2025-08-28 | Stop reason: SURG

## 2025-08-28 RX ORDER — SODIUM CHLORIDE, SODIUM LACTATE, POTASSIUM CHLORIDE, CALCIUM CHLORIDE 600; 310; 30; 20 MG/100ML; MG/100ML; MG/100ML; MG/100ML
30 INJECTION, SOLUTION INTRAVENOUS CONTINUOUS
Status: DISCONTINUED | OUTPATIENT
Start: 2025-08-28 | End: 2025-08-28 | Stop reason: HOSPADM

## 2025-08-28 RX ORDER — LIDOCAINE HYDROCHLORIDE 20 MG/ML
INJECTION, SOLUTION EPIDURAL; INFILTRATION; INTRACAUDAL; PERINEURAL AS NEEDED
Status: DISCONTINUED | OUTPATIENT
Start: 2025-08-28 | End: 2025-08-28 | Stop reason: SURG

## 2025-08-28 RX ADMIN — PROPOFOL 150 MG: 10 INJECTION, EMULSION INTRAVENOUS at 11:55

## 2025-08-28 RX ADMIN — PROPOFOL 250 MCG/KG/MIN: 10 INJECTION, EMULSION INTRAVENOUS at 12:02

## 2025-08-28 RX ADMIN — PROPOFOL 30 MG: 10 INJECTION, EMULSION INTRAVENOUS at 12:03

## 2025-08-28 RX ADMIN — SODIUM CHLORIDE, POTASSIUM CHLORIDE, SODIUM LACTATE AND CALCIUM CHLORIDE: 600; 310; 30; 20 INJECTION, SOLUTION INTRAVENOUS at 11:46

## 2025-08-28 RX ADMIN — LIDOCAINE HYDROCHLORIDE 150 MG: 20 INJECTION, SOLUTION EPIDURAL; INFILTRATION; INTRACAUDAL; PERINEURAL at 11:55

## 2025-08-28 RX ADMIN — PROPOFOL 50 MG: 10 INJECTION, EMULSION INTRAVENOUS at 12:01

## 2025-08-29 LAB
CYTO UR: NORMAL
LAB AP CASE REPORT: NORMAL
LAB AP CLINICAL INFORMATION: NORMAL
PATH REPORT.FINAL DX SPEC: NORMAL
PATH REPORT.GROSS SPEC: NORMAL

## (undated) DEVICE — SOLIDIFIER LIQLOC PLS 1500CC BT

## (undated) DEVICE — SOL IRRG H2O PL/BG 1000ML STRL

## (undated) DEVICE — DEFENDO AIR WATER SUCTION AND BIOPSY VALVE KIT: Brand: DEFENDO AIR/WATER/SUCTION AND BIOPSY VALVE

## (undated) DEVICE — THE STERILE LIGHT HANDLE COVER IS USED WITH STERIS SURGICAL LIGHTING AND VISUALIZATION SYSTEMS.

## (undated) DEVICE — BLCK/BITE BLOX WO/DENTL/RIM W/STRAP 54F

## (undated) DEVICE — SINGLE-USE BIOPSY FORCEPS: Brand: RADIAL JAW 4

## (undated) DEVICE — LINER SURG CANSTR SXN S/RIGD 1500CC

## (undated) DEVICE — CONN JET HYDRA H20 AUXILIARY DISP

## (undated) DEVICE — Device